# Patient Record
Sex: FEMALE | Race: WHITE | Employment: STUDENT | URBAN - METROPOLITAN AREA
[De-identification: names, ages, dates, MRNs, and addresses within clinical notes are randomized per-mention and may not be internally consistent; named-entity substitution may affect disease eponyms.]

---

## 2017-01-10 ENCOUNTER — OFFICE VISIT - HEALTHEAST (OUTPATIENT)
Dept: RHEUMATOLOGY | Facility: CLINIC | Age: 22
End: 2017-01-10

## 2017-01-10 DIAGNOSIS — M25.50 POLYARTHRALGIA: ICD-10-CM

## 2017-01-10 LAB
C3 SERPL-MCNC: 138 MG/DL (ref 83–177)
C4 SERPL-MCNC: 33 MG/DL (ref 19–59)

## 2017-01-12 LAB
ANA SER QL: 0.3 U
COMPLEMENT, TOTAL, S: 57 U/ML (ref 30–75)

## 2017-01-17 LAB — DNA (DS) ANTIBODY - HISTORICAL: 6 IU

## 2017-01-24 ENCOUNTER — OFFICE VISIT - HEALTHEAST (OUTPATIENT)
Dept: FAMILY MEDICINE | Facility: CLINIC | Age: 22
End: 2017-01-24

## 2017-01-24 DIAGNOSIS — F32.A DEPRESSION: ICD-10-CM

## 2017-01-24 DIAGNOSIS — F41.9 ANXIETY: ICD-10-CM

## 2017-01-24 ASSESSMENT — MIFFLIN-ST. JEOR: SCORE: 1502.46

## 2017-02-08 ENCOUNTER — OFFICE VISIT - HEALTHEAST (OUTPATIENT)
Dept: RHEUMATOLOGY | Facility: CLINIC | Age: 22
End: 2017-02-08

## 2017-02-08 ENCOUNTER — COMMUNICATION - HEALTHEAST (OUTPATIENT)
Dept: FAMILY MEDICINE | Facility: CLINIC | Age: 22
End: 2017-02-08

## 2017-02-08 DIAGNOSIS — M79.7 FIBROMYALGIA: ICD-10-CM

## 2017-02-08 ASSESSMENT — MIFFLIN-ST. JEOR: SCORE: 1502.46

## 2017-03-13 ENCOUNTER — COMMUNICATION - HEALTHEAST (OUTPATIENT)
Dept: FAMILY MEDICINE | Facility: CLINIC | Age: 22
End: 2017-03-13

## 2017-03-13 DIAGNOSIS — F32.A DEPRESSION: ICD-10-CM

## 2017-04-07 ENCOUNTER — COMMUNICATION - HEALTHEAST (OUTPATIENT)
Dept: FAMILY MEDICINE | Facility: CLINIC | Age: 22
End: 2017-04-07

## 2017-04-07 DIAGNOSIS — F32.A DEPRESSION: ICD-10-CM

## 2017-04-07 DIAGNOSIS — F41.9 ANXIETY: ICD-10-CM

## 2017-06-28 ENCOUNTER — OFFICE VISIT - HEALTHEAST (OUTPATIENT)
Dept: FAMILY MEDICINE | Facility: CLINIC | Age: 22
End: 2017-06-28

## 2017-06-28 DIAGNOSIS — M25.559 HIP PAIN: ICD-10-CM

## 2017-06-28 DIAGNOSIS — N83.209 OVARIAN CYST: ICD-10-CM

## 2017-06-28 DIAGNOSIS — M25.519 SHOULDER PAIN: ICD-10-CM

## 2017-06-28 ASSESSMENT — MIFFLIN-ST. JEOR: SCORE: 1525.14

## 2017-07-01 ENCOUNTER — HEALTH MAINTENANCE LETTER (OUTPATIENT)
Age: 22
End: 2017-07-01

## 2017-07-24 ENCOUNTER — COMMUNICATION - HEALTHEAST (OUTPATIENT)
Dept: FAMILY MEDICINE | Facility: CLINIC | Age: 22
End: 2017-07-24

## 2017-08-01 ENCOUNTER — COMMUNICATION - HEALTHEAST (OUTPATIENT)
Dept: FAMILY MEDICINE | Facility: CLINIC | Age: 22
End: 2017-08-01

## 2017-09-22 ENCOUNTER — RECORDS - HEALTHEAST (OUTPATIENT)
Dept: ADMINISTRATIVE | Facility: OTHER | Age: 22
End: 2017-09-22

## 2017-10-04 ENCOUNTER — OFFICE VISIT - HEALTHEAST (OUTPATIENT)
Dept: FAMILY MEDICINE | Facility: CLINIC | Age: 22
End: 2017-10-04

## 2017-10-04 DIAGNOSIS — M25.512 SHOULDER PAIN, BILATERAL: ICD-10-CM

## 2017-10-04 DIAGNOSIS — Z00.00 HEALTHCARE MAINTENANCE: ICD-10-CM

## 2017-10-04 DIAGNOSIS — F41.9 ANXIETY: ICD-10-CM

## 2017-10-04 DIAGNOSIS — Z11.3 SCREEN FOR STD (SEXUALLY TRANSMITTED DISEASE): ICD-10-CM

## 2017-10-04 DIAGNOSIS — M25.511 SHOULDER PAIN, BILATERAL: ICD-10-CM

## 2017-10-04 LAB — HIV 1+2 AB+HIV1 P24 AG SERPL QL IA: NEGATIVE

## 2017-10-04 ASSESSMENT — MIFFLIN-ST. JEOR: SCORE: 1535.91

## 2017-10-05 LAB
HBV SURFACE AG SERPL QL IA: NEGATIVE
SYPHILIS RPR SCREEN - HISTORICAL: NORMAL

## 2017-10-14 ENCOUNTER — COMMUNICATION - HEALTHEAST (OUTPATIENT)
Dept: FAMILY MEDICINE | Facility: CLINIC | Age: 22
End: 2017-10-14

## 2017-10-14 DIAGNOSIS — N83.209 OVARIAN CYST: ICD-10-CM

## 2017-11-02 ENCOUNTER — COMMUNICATION - HEALTHEAST (OUTPATIENT)
Dept: FAMILY MEDICINE | Facility: CLINIC | Age: 22
End: 2017-11-02

## 2018-01-06 ENCOUNTER — COMMUNICATION - HEALTHEAST (OUTPATIENT)
Dept: FAMILY MEDICINE | Facility: CLINIC | Age: 23
End: 2018-01-06

## 2018-01-06 DIAGNOSIS — N83.209 OVARIAN CYST: ICD-10-CM

## 2018-01-09 ENCOUNTER — COMMUNICATION - HEALTHEAST (OUTPATIENT)
Dept: FAMILY MEDICINE | Facility: CLINIC | Age: 23
End: 2018-01-09

## 2018-01-17 ENCOUNTER — OFFICE VISIT - HEALTHEAST (OUTPATIENT)
Dept: FAMILY MEDICINE | Facility: CLINIC | Age: 23
End: 2018-01-17

## 2018-01-17 DIAGNOSIS — J32.9 SINUSITIS: ICD-10-CM

## 2018-03-07 ENCOUNTER — OFFICE VISIT - HEALTHEAST (OUTPATIENT)
Dept: FAMILY MEDICINE | Facility: CLINIC | Age: 23
End: 2018-03-07

## 2018-03-07 DIAGNOSIS — Z00.00 HEALTHCARE MAINTENANCE: ICD-10-CM

## 2018-03-07 DIAGNOSIS — F32.A DEPRESSION: ICD-10-CM

## 2018-03-07 ASSESSMENT — MIFFLIN-ST. JEOR: SCORE: 1550

## 2018-03-09 LAB
BKR LAB AP ABNORMAL BLEEDING: NO
BKR LAB AP BIRTH CONTROL/HORMONES: NORMAL
BKR LAB AP CERVICAL APPEARANCE: NORMAL
BKR LAB AP GYN ADEQUACY: NORMAL
BKR LAB AP GYN INTERPRETATION: NORMAL
BKR LAB AP HPV REFLEX: NORMAL
BKR LAB AP LMP: NORMAL
BKR LAB AP PATIENT STATUS: NORMAL
BKR LAB AP PREVIOUS ABNORMAL: NORMAL
BKR LAB AP PREVIOUS NORMAL: 2015
HIGH RISK?: NO
PATH REPORT.COMMENTS IMP SPEC: NORMAL
RESULT FLAG (HE HISTORICAL CONVERSION): NORMAL

## 2018-03-10 ENCOUNTER — COMMUNICATION - HEALTHEAST (OUTPATIENT)
Dept: FAMILY MEDICINE | Facility: CLINIC | Age: 23
End: 2018-03-10

## 2018-03-10 DIAGNOSIS — F32.A DEPRESSION: ICD-10-CM

## 2018-04-10 ENCOUNTER — COMMUNICATION - HEALTHEAST (OUTPATIENT)
Dept: FAMILY MEDICINE | Facility: CLINIC | Age: 23
End: 2018-04-10

## 2018-04-13 ENCOUNTER — COMMUNICATION - HEALTHEAST (OUTPATIENT)
Dept: FAMILY MEDICINE | Facility: CLINIC | Age: 23
End: 2018-04-13

## 2018-04-18 ENCOUNTER — TRANSFERRED RECORDS (OUTPATIENT)
Dept: HEALTH INFORMATION MANAGEMENT | Facility: CLINIC | Age: 23
End: 2018-04-18
Payer: COMMERCIAL

## 2018-05-02 ENCOUNTER — COMMUNICATION - HEALTHEAST (OUTPATIENT)
Dept: FAMILY MEDICINE | Facility: CLINIC | Age: 23
End: 2018-05-02

## 2018-05-09 ENCOUNTER — OFFICE VISIT - HEALTHEAST (OUTPATIENT)
Dept: FAMILY MEDICINE | Facility: CLINIC | Age: 23
End: 2018-05-09

## 2018-05-09 DIAGNOSIS — F41.9 ANXIETY: ICD-10-CM

## 2018-05-09 ASSESSMENT — MIFFLIN-ST. JEOR: SCORE: 1580.69

## 2018-05-22 ENCOUNTER — COMMUNICATION - HEALTHEAST (OUTPATIENT)
Dept: FAMILY MEDICINE | Facility: CLINIC | Age: 23
End: 2018-05-22

## 2018-05-22 DIAGNOSIS — F41.9 ANXIETY: ICD-10-CM

## 2018-06-03 ENCOUNTER — COMMUNICATION - HEALTHEAST (OUTPATIENT)
Dept: FAMILY MEDICINE | Facility: CLINIC | Age: 23
End: 2018-06-03

## 2018-06-03 DIAGNOSIS — F32.A DEPRESSION: ICD-10-CM

## 2018-06-05 ENCOUNTER — COMMUNICATION - HEALTHEAST (OUTPATIENT)
Dept: FAMILY MEDICINE | Facility: CLINIC | Age: 23
End: 2018-06-05

## 2018-06-05 DIAGNOSIS — R06.02 SHORTNESS OF BREATH: ICD-10-CM

## 2018-06-06 ENCOUNTER — RECORDS - HEALTHEAST (OUTPATIENT)
Dept: ADMINISTRATIVE | Facility: OTHER | Age: 23
End: 2018-06-06

## 2018-06-27 ENCOUNTER — RECORDS - HEALTHEAST (OUTPATIENT)
Dept: ADMINISTRATIVE | Facility: OTHER | Age: 23
End: 2018-06-27

## 2018-07-03 ENCOUNTER — OFFICE VISIT - HEALTHEAST (OUTPATIENT)
Dept: FAMILY MEDICINE | Facility: CLINIC | Age: 23
End: 2018-07-03

## 2018-07-03 DIAGNOSIS — Z79.899 HIGH RISK MEDICATION USE: ICD-10-CM

## 2018-07-03 DIAGNOSIS — F41.9 ANXIETY: ICD-10-CM

## 2018-07-03 DIAGNOSIS — R11.0 NAUSEA: ICD-10-CM

## 2018-07-03 DIAGNOSIS — F32.1 MODERATE MAJOR DEPRESSION (H): ICD-10-CM

## 2018-07-03 LAB
ALBUMIN SERPL-MCNC: 4.1 G/DL (ref 3.5–5)
ALP SERPL-CCNC: 66 U/L (ref 45–120)
ALT SERPL W P-5'-P-CCNC: 21 U/L (ref 0–45)
AMPHETAMINES UR QL SCN: NORMAL
ANION GAP SERPL CALCULATED.3IONS-SCNC: 10 MMOL/L (ref 5–18)
AST SERPL W P-5'-P-CCNC: 23 U/L (ref 0–40)
BARBITURATES UR QL: NORMAL
BASOPHILS # BLD AUTO: 0.1 THOU/UL (ref 0–0.2)
BASOPHILS NFR BLD AUTO: 1 % (ref 0–2)
BENZODIAZ UR QL: NORMAL
BILIRUB SERPL-MCNC: 0.3 MG/DL (ref 0–1)
BUN SERPL-MCNC: 10 MG/DL (ref 8–22)
CALCIUM SERPL-MCNC: 9.8 MG/DL (ref 8.5–10.5)
CANNABINOIDS UR QL SCN: NORMAL
CHLORIDE BLD-SCNC: 105 MMOL/L (ref 98–107)
CO2 SERPL-SCNC: 27 MMOL/L (ref 22–31)
COCAINE UR QL: NORMAL
CREAT SERPL-MCNC: 0.67 MG/DL (ref 0.6–1.1)
CREAT UR-MCNC: 107.4 MG/DL
EOSINOPHIL # BLD AUTO: 0.2 THOU/UL (ref 0–0.4)
EOSINOPHIL NFR BLD AUTO: 2 % (ref 0–6)
ERYTHROCYTE [DISTWIDTH] IN BLOOD BY AUTOMATED COUNT: 11.8 % (ref 11–14.5)
ETHANOL UR CFM-MCNC: <10 MG/DL
GFR SERPL CREATININE-BSD FRML MDRD: >60 ML/MIN/1.73M2
GLUCOSE BLD-MCNC: 94 MG/DL (ref 70–125)
HCT VFR BLD AUTO: 40.7 % (ref 35–47)
HGB BLD-MCNC: 13.9 G/DL (ref 12–16)
LYMPHOCYTES # BLD AUTO: 3.1 THOU/UL (ref 0.8–4.4)
LYMPHOCYTES NFR BLD AUTO: 40 % (ref 20–40)
MCH RBC QN AUTO: 30.4 PG (ref 27–34)
MCHC RBC AUTO-ENTMCNC: 34.1 G/DL (ref 32–36)
MCV RBC AUTO: 89 FL (ref 80–100)
MONOCYTES # BLD AUTO: 0.4 THOU/UL (ref 0–0.9)
MONOCYTES NFR BLD AUTO: 5 % (ref 2–10)
NEUTROPHILS # BLD AUTO: 4.1 THOU/UL (ref 2–7.7)
NEUTROPHILS NFR BLD AUTO: 52 % (ref 50–70)
OPIATES UR QL SCN: NORMAL
OXYCODONE UR QL: NORMAL
PCP UR QL SCN: NORMAL
PLATELET # BLD AUTO: 391 THOU/UL (ref 140–440)
PMV BLD AUTO: 8.2 FL (ref 7–10)
POTASSIUM BLD-SCNC: 4.6 MMOL/L (ref 3.5–5)
PROT SERPL-MCNC: 7.3 G/DL (ref 6–8)
RBC # BLD AUTO: 4.57 MILL/UL (ref 3.8–5.4)
SODIUM SERPL-SCNC: 142 MMOL/L (ref 136–145)
T4 FREE SERPL-MCNC: 0.9 NG/DL (ref 0.7–1.8)
TSH SERPL DL<=0.005 MIU/L-ACNC: 1.53 UIU/ML (ref 0.3–5)
WBC: 7.8 THOU/UL (ref 4–11)

## 2018-07-03 ASSESSMENT — MIFFLIN-ST. JEOR: SCORE: 1567.93

## 2018-07-08 LAB
ARUP MISCELLANEOUS TEST: NORMAL
MISCELLANEOUS TEST DEPT. - HE HISTORICAL: NORMAL
PERFORMING LAB: NORMAL
SPECIMEN STATUS: NORMAL
TEST NAME: NORMAL

## 2018-07-09 LAB
MISCELLANEOUS TEST DEPT. - HE HISTORICAL: NORMAL
PERFORMING LAB: NORMAL
SPECIMEN STATUS: NORMAL
TEST NAME: NORMAL

## 2018-08-03 ENCOUNTER — COMMUNICATION - HEALTHEAST (OUTPATIENT)
Dept: FAMILY MEDICINE | Facility: CLINIC | Age: 23
End: 2018-08-03

## 2018-10-01 ENCOUNTER — AMBULATORY - HEALTHEAST (OUTPATIENT)
Dept: NURSING | Facility: CLINIC | Age: 23
End: 2018-10-01

## 2018-10-22 ENCOUNTER — OFFICE VISIT - HEALTHEAST (OUTPATIENT)
Dept: FAMILY MEDICINE | Facility: CLINIC | Age: 23
End: 2018-10-22

## 2018-10-22 DIAGNOSIS — M25.552 PAIN OF BOTH HIP JOINTS: ICD-10-CM

## 2018-10-22 DIAGNOSIS — M25.551 PAIN OF BOTH HIP JOINTS: ICD-10-CM

## 2018-10-22 DIAGNOSIS — F41.9 ANXIETY: ICD-10-CM

## 2018-10-22 ASSESSMENT — MIFFLIN-ST. JEOR: SCORE: 1542.99

## 2018-10-30 ENCOUNTER — OFFICE VISIT - HEALTHEAST (OUTPATIENT)
Dept: FAMILY MEDICINE | Facility: CLINIC | Age: 23
End: 2018-10-30

## 2018-10-30 ENCOUNTER — COMMUNICATION - HEALTHEAST (OUTPATIENT)
Dept: FAMILY MEDICINE | Facility: CLINIC | Age: 23
End: 2018-10-30

## 2018-10-30 DIAGNOSIS — Z30.432 ENCOUNTER FOR IUD REMOVAL: ICD-10-CM

## 2018-10-30 DIAGNOSIS — Z31.69 ENCOUNTER FOR PRECONCEPTION CONSULTATION: ICD-10-CM

## 2018-10-30 ASSESSMENT — MIFFLIN-ST. JEOR: SCORE: 1543.55

## 2019-04-24 ENCOUNTER — COMMUNICATION - HEALTHEAST (OUTPATIENT)
Dept: FAMILY MEDICINE | Facility: CLINIC | Age: 24
End: 2019-04-24

## 2019-09-04 ENCOUNTER — OFFICE VISIT - HEALTHEAST (OUTPATIENT)
Dept: FAMILY MEDICINE | Facility: CLINIC | Age: 24
End: 2019-09-04

## 2019-09-04 ENCOUNTER — COMMUNICATION - HEALTHEAST (OUTPATIENT)
Dept: FAMILY MEDICINE | Facility: CLINIC | Age: 24
End: 2019-09-04

## 2019-09-04 DIAGNOSIS — Z00.00 ROUTINE GENERAL MEDICAL EXAMINATION AT A HEALTH CARE FACILITY: ICD-10-CM

## 2019-09-04 DIAGNOSIS — F41.9 ANXIETY: ICD-10-CM

## 2019-09-04 DIAGNOSIS — Z11.3 SCREEN FOR STD (SEXUALLY TRANSMITTED DISEASE): ICD-10-CM

## 2019-09-04 DIAGNOSIS — Z79.899 HIGH RISK MEDICATION USE: ICD-10-CM

## 2019-09-04 DIAGNOSIS — R11.0 NAUSEA: ICD-10-CM

## 2019-09-04 LAB
CHOLEST SERPL-MCNC: 151 MG/DL
FASTING STATUS PATIENT QL REPORTED: NO
HBA1C MFR BLD: 5 % (ref 3.5–6)
HDLC SERPL-MCNC: 34 MG/DL
HGB BLD-MCNC: 14 G/DL (ref 12–16)
HIV 1+2 AB+HIV1 P24 AG SERPL QL IA: NEGATIVE
LDLC SERPL CALC-MCNC: 92 MG/DL
TRIGL SERPL-MCNC: 125 MG/DL

## 2019-09-04 ASSESSMENT — MIFFLIN-ST. JEOR: SCORE: 1507.92

## 2019-09-05 ENCOUNTER — COMMUNICATION - HEALTHEAST (OUTPATIENT)
Dept: FAMILY MEDICINE | Facility: CLINIC | Age: 24
End: 2019-09-05

## 2019-09-05 LAB — T PALLIDUM AB SER QL: NEGATIVE

## 2019-09-06 ASSESSMENT — ANXIETY QUESTIONNAIRES
2. NOT BEING ABLE TO STOP OR CONTROL WORRYING: NEARLY EVERY DAY
7. FEELING AFRAID AS IF SOMETHING AWFUL MIGHT HAPPEN: NEARLY EVERY DAY
6. BECOMING EASILY ANNOYED OR IRRITABLE: NEARLY EVERY DAY
4. TROUBLE RELAXING: NEARLY EVERY DAY
IF YOU CHECKED OFF ANY PROBLEMS ON THIS QUESTIONNAIRE, HOW DIFFICULT HAVE THESE PROBLEMS MADE IT FOR YOU TO DO YOUR WORK, TAKE CARE OF THINGS AT HOME, OR GET ALONG WITH OTHER PEOPLE: VERY DIFFICULT
5. BEING SO RESTLESS THAT IT IS HARD TO SIT STILL: MORE THAN HALF THE DAYS
1. FEELING NERVOUS, ANXIOUS, OR ON EDGE: NEARLY EVERY DAY
GAD7 TOTAL SCORE: 20
3. WORRYING TOO MUCH ABOUT DIFFERENT THINGS: NEARLY EVERY DAY

## 2019-09-06 ASSESSMENT — PATIENT HEALTH QUESTIONNAIRE - PHQ9: SUM OF ALL RESPONSES TO PHQ QUESTIONS 1-9: 15

## 2019-10-03 ENCOUNTER — HEALTH MAINTENANCE LETTER (OUTPATIENT)
Age: 24
End: 2019-10-03

## 2019-10-16 ENCOUNTER — COMMUNICATION - HEALTHEAST (OUTPATIENT)
Dept: FAMILY MEDICINE | Facility: CLINIC | Age: 24
End: 2019-10-16

## 2019-10-16 DIAGNOSIS — Z78.9 USES BIRTH CONTROL: ICD-10-CM

## 2019-11-12 ENCOUNTER — OFFICE VISIT - HEALTHEAST (OUTPATIENT)
Dept: FAMILY MEDICINE | Facility: CLINIC | Age: 24
End: 2019-11-12

## 2019-11-12 DIAGNOSIS — F32.1 MODERATE MAJOR DEPRESSION (H): ICD-10-CM

## 2019-11-12 DIAGNOSIS — F41.9 ANXIETY: ICD-10-CM

## 2019-11-12 DIAGNOSIS — R11.0 NAUSEA: ICD-10-CM

## 2019-11-12 DIAGNOSIS — Z79.899 HIGH RISK MEDICATION USE: ICD-10-CM

## 2019-11-12 DIAGNOSIS — Z30.09 ENCOUNTER FOR OTHER GENERAL COUNSELING AND ADVICE ON CONTRACEPTION: ICD-10-CM

## 2019-11-12 ASSESSMENT — MIFFLIN-ST. JEOR: SCORE: 1467.93

## 2019-11-19 ENCOUNTER — OFFICE VISIT - HEALTHEAST (OUTPATIENT)
Dept: FAMILY MEDICINE | Facility: CLINIC | Age: 24
End: 2019-11-19

## 2019-11-19 DIAGNOSIS — Z30.430 ENCOUNTER FOR IUD INSERTION: ICD-10-CM

## 2019-11-19 LAB — HCG UR QL: NEGATIVE

## 2019-12-16 ENCOUNTER — OFFICE VISIT - HEALTHEAST (OUTPATIENT)
Dept: FAMILY MEDICINE | Facility: CLINIC | Age: 24
End: 2019-12-16

## 2019-12-16 ENCOUNTER — COMMUNICATION - HEALTHEAST (OUTPATIENT)
Dept: FAMILY MEDICINE | Facility: CLINIC | Age: 24
End: 2019-12-16

## 2019-12-16 DIAGNOSIS — Z30.431 IUD CHECK UP: ICD-10-CM

## 2019-12-16 DIAGNOSIS — R05.9 COUGH: ICD-10-CM

## 2019-12-16 DIAGNOSIS — R06.02 SHORTNESS OF BREATH: ICD-10-CM

## 2019-12-16 ASSESSMENT — MIFFLIN-ST. JEOR: SCORE: 1495.43

## 2019-12-18 ENCOUNTER — COMMUNICATION - HEALTHEAST (OUTPATIENT)
Dept: FAMILY MEDICINE | Facility: CLINIC | Age: 24
End: 2019-12-18

## 2019-12-18 DIAGNOSIS — R06.02 SHORTNESS OF BREATH: ICD-10-CM

## 2020-02-07 ENCOUNTER — RECORDS - HEALTHEAST (OUTPATIENT)
Dept: ADMINISTRATIVE | Facility: OTHER | Age: 25
End: 2020-02-07

## 2020-02-07 ENCOUNTER — HOSPITAL ENCOUNTER (EMERGENCY)
Facility: CLINIC | Age: 25
Discharge: HOME | End: 2020-02-07
Attending: NURSE PRACTITIONER | Admitting: NURSE PRACTITIONER
Payer: COMMERCIAL

## 2020-02-07 VITALS
OXYGEN SATURATION: 100 % | HEIGHT: 69 IN | BODY MASS INDEX: 22.22 KG/M2 | SYSTOLIC BLOOD PRESSURE: 107 MMHG | WEIGHT: 150 LBS | TEMPERATURE: 98.2 F | RESPIRATION RATE: 18 BRPM | HEART RATE: 103 BPM | DIASTOLIC BLOOD PRESSURE: 73 MMHG

## 2020-02-07 DIAGNOSIS — J06.9 VIRAL URI WITH COUGH: ICD-10-CM

## 2020-02-07 LAB
FLUAV AG UPPER RESP QL IA.RAPID: NEGATIVE
FLUBV AG UPPER RESP QL IA.RAPID: NEGATIVE
INTERNAL QC OK POCT: YES
INTERNAL QC OK POCT: YES
S PYO AG THROAT QL IA.RAPID: NEGATIVE

## 2020-02-07 PROCEDURE — 87081 CULTURE SCREEN ONLY: CPT | Performed by: NURSE PRACTITIONER

## 2020-02-07 PROCEDURE — G0463 HOSPITAL OUTPT CLINIC VISIT: HCPCS | Performed by: NURSE PRACTITIONER

## 2020-02-07 PROCEDURE — 99213 OFFICE O/P EST LOW 20 MIN: CPT | Mod: Z6 | Performed by: NURSE PRACTITIONER

## 2020-02-07 PROCEDURE — 87804 INFLUENZA ASSAY W/OPTIC: CPT | Performed by: NURSE PRACTITIONER

## 2020-02-07 PROCEDURE — 87880 STREP A ASSAY W/OPTIC: CPT | Performed by: NURSE PRACTITIONER

## 2020-02-07 ASSESSMENT — ENCOUNTER SYMPTOMS
CHILLS: 1
MYALGIAS: 1
SORE THROAT: 1
SHORTNESS OF BREATH: 0
COUGH: 1
DIARRHEA: 0
HEADACHES: 1
RHINORRHEA: 1
ABDOMINAL PAIN: 0
NAUSEA: 1
VOMITING: 0
WHEEZING: 0
FEVER: 1

## 2020-02-07 ASSESSMENT — MIFFLIN-ST. JEOR: SCORE: 1484.78

## 2020-02-07 NOTE — ED AVS SNAPSHOT
[facility] Emergency Department  [address] [facility] [address]  Phone:  [phone]  Fax:  [phone]                                    Britney Singh   MRN: [id number]    Department:  [facility] Emergency Department   Date of Visit:  2/7/2020           After Visit Summary Signature Page    I have received my discharge instructions, and my questions have been answered. I have discussed any challenges I see with this plan with the nurse or doctor.    ..........................................................................................................................................  Patient/Patient Representative Signature      ..........................................................................................................................................  Patient Representative Print Name and Relationship to Patient    ..................................................               ................................................  Date                                   Time    ..........................................................................................................................................  Reviewed by Signature/Title    ...................................................              ..............................................  Date                                               Time          22EPIC Rev 08/18

## 2020-02-07 NOTE — ED PROVIDER NOTES
History     Chief Complaint   Patient presents with     URI     LORETTA     HPI  Britney Singh is a 24 year old female with history of asthma who presents to urgent care for evaluation of cough, congestion, fever, body aches, and sore throat.  Symptoms started yesterday.  Nauseated, but no vomiting.  Tolerating fluids.  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    Allergies:  Allergies   Allergen Reactions     Amoxicillin Hives     mild hive reaction        Problem List:    Patient Active Problem List    Diagnosis Date Noted     Dysmenorrhea 2014     Priority: Medium     seasonale to see it helps  Ultram for the pain  Pelvic US in 6 weeks to f/u resolution of the ovarian cyst-US tomorrow-normal US  No help with seasonale-wants to try the depo-provera  Discussed that with symptoms some concern for endometriosis and might consider lupron versus laparoscopy       Anxiety 2013     Priority: Medium     Anorexia nervosa 2013     Priority: Medium     Insomnia 2013     Priority: Medium     TMJ (temporomandibular joint syndrome) 2011     Priority: Medium     Intermittent asthma 2011     Priority: Medium        Past Medical History:    Past Medical History:   Diagnosis Date     Anorexia      Asthma      Bipolar 1 disorder (H)      Depressive disorder        Past Surgical History:    History reviewed. No pertinent surgical history.    Family History:    Family History   Problem Relation Age of Onset     Alcohol/Drug Father      Alzheimer Disease Maternal Grandmother         dementia     Cerebrovascular Disease Maternal Grandfather      Breast Cancer Paternal Grandmother      Asthma Brother      Family History Negative Other        Social History:  Marital Status:  Single [1]  Social History     Tobacco Use     Smoking status: Former Smoker     Last attempt to quit: 2014     Years since quittin.0     Smokeless tobacco: Never Used  "  Substance Use Topics     Alcohol use: None     Comment: has in the past     Drug use: None     Comment: has in the past        Medications:    albuterol (PROAIR HFA) 108 (90 BASE) MCG/ACT inhaler  BuPROPion HCl (WELLBUTRIN XL PO)  simethicone (MYLICON) 80 MG chewable tablet          Review of Systems   Constitutional: Positive for chills and fever.   HENT: Positive for congestion, ear pain, rhinorrhea and sore throat.    Respiratory: Positive for cough. Negative for shortness of breath and wheezing.    Cardiovascular: Negative for chest pain.   Gastrointestinal: Positive for nausea. Negative for abdominal pain, diarrhea and vomiting.   Genitourinary: Negative.    Musculoskeletal: Positive for myalgias.   Neurological: Positive for headaches.       Physical Exam   BP: 107/73  Pulse: 103  Temp: 98.2  F (36.8  C)  Resp: 18  Height: 175.3 cm (5' 9\")  Weight: 68 kg (150 lb)  SpO2: 100 %      Physical Exam  GENERAL APPEARANCE: healthy, alert and ill-appearing but nontoxic.    EYES: watery eye drainage but conjunctiva clear  HENT: ear canals and TM's normal.  Rhinorrhea.  Posterior oropharynx erythema without exudate.  Uvula is midline.  No unilateral peritonsillar swelling. No trismus  NECK: supple, nontender, no lymphadenopathy  RESP: lungs clear to auscultation - no rales, rhonchi or wheezes  CV: tachycardia and regular rhythm, normal S1 S2, no murmur noted    ED Course        Procedures             Results for orders placed or performed during the hospital encounter of 02/07/20 (from the past 24 hour(s))   Rapid strep group A screen POCT   Result Value Ref Range    Rapid Strep A Screen Negative neg    Internal QC OK Yes    Influenza A/B antigen POCT   Result Value Ref Range    Influenza A Negative neg    Influenza B Negative neg    Internal QC OK Yes        Medications - No data to display    Assessments & Plan (with Medical Decision Making)   24 year old with URI and cough symptoms for 1 day. Tolerating fluids. "   DDx:  bronchitis, pneumonia, Viral URI (ie.. Influenza), sinusitis.    On exam patient is ill-appearing, but afebrile.  Normotensive.  Mild tachycardia.  Rhinorrhea present.  Lungs are CTA.  No hypoxia.  No respiratory distress.  No indication to obtain chest imaging at this time.  I have low suspicion for pneumonia.  Rapid strep is negative.  Influenza testing is negative.  I discussed with patient that this is likely a viral URI with cough, could still be an influenza-like illness with a false negative test.  We discussed treatment for influenza with Tamiflu if she wishes to proceed with this.  I reviewed the benefits, risks, and side effects of Tamiflu.  Patient declined Tamiflu at this time.    DDx:  bronchitis, pneumonia, Viral URI (ie.. Influenza), sinusitis.    I discussed the course of viral illness with patient as well as symptomatic treatment and worrisome reasons to recheck.    I have reviewed the nursing notes.    I have reviewed the findings, diagnosis, plan and need for follow up with the patient.      New Prescriptions    No medications on file       Final diagnoses:   Viral URI with cough       2/7/2020   [facility] EMERGENCY DEPARTMENT     Esther Rosa APRN CNP  02/07/20 [id number]

## 2020-02-07 NOTE — DISCHARGE INSTRUCTIONS
Drink plenty of fluids.  Rest.  For cough, dextromethorphan/guaifenesin combinations help loosen secretions and suppress cough safely without significant risk of sedation.      For nasal congestion and sinus pressure, pseudoephedrine (Sudafed) or phenylephrine is often helpful but it can cause elevations in blood pressure and insomnia.  Short courses of a nasal decongestant spray (Afrin or Neosinephrine) can be appropriate but their use should be restricted to 3 days due to the high risk of rebound congestion.  Use Coricidin as a decongestant if you have a history of hypertension.     For pain and fevers, acetaminophen (Tylenol) is most appropriate.  Ibuprofen (Advil) or naproxen (Aleve) are useful too and last longer but they can cause elevation of blood pressure or stomach problems.

## 2020-02-09 LAB
BACTERIA SPEC CULT: NORMAL
SPECIMEN SOURCE: NORMAL

## 2020-02-18 ENCOUNTER — COMMUNICATION - HEALTHEAST (OUTPATIENT)
Dept: FAMILY MEDICINE | Facility: CLINIC | Age: 25
End: 2020-02-18

## 2020-02-27 ENCOUNTER — COMMUNICATION - HEALTHEAST (OUTPATIENT)
Dept: FAMILY MEDICINE | Facility: CLINIC | Age: 25
End: 2020-02-27

## 2020-02-27 DIAGNOSIS — Z97.5 BREAKTHROUGH BLEEDING ASSOCIATED WITH INTRAUTERINE DEVICE (IUD): ICD-10-CM

## 2020-02-27 DIAGNOSIS — N92.1 BREAKTHROUGH BLEEDING ASSOCIATED WITH INTRAUTERINE DEVICE (IUD): ICD-10-CM

## 2020-03-23 ENCOUNTER — COMMUNICATION - HEALTHEAST (OUTPATIENT)
Dept: FAMILY MEDICINE | Facility: CLINIC | Age: 25
End: 2020-03-23

## 2020-04-03 ENCOUNTER — COMMUNICATION - HEALTHEAST (OUTPATIENT)
Dept: FAMILY MEDICINE | Facility: CLINIC | Age: 25
End: 2020-04-03

## 2020-04-03 DIAGNOSIS — Z79.899 HIGH RISK MEDICATION USE: ICD-10-CM

## 2020-04-03 DIAGNOSIS — R11.0 NAUSEA: ICD-10-CM

## 2020-04-03 DIAGNOSIS — F41.9 ANXIETY: ICD-10-CM

## 2020-05-22 ENCOUNTER — COMMUNICATION - HEALTHEAST (OUTPATIENT)
Dept: FAMILY MEDICINE | Facility: CLINIC | Age: 25
End: 2020-05-22

## 2020-05-27 ENCOUNTER — OFFICE VISIT - HEALTHEAST (OUTPATIENT)
Dept: FAMILY MEDICINE | Facility: CLINIC | Age: 25
End: 2020-05-27

## 2020-05-27 DIAGNOSIS — Z30.431 IUD CHECK UP: ICD-10-CM

## 2020-05-27 DIAGNOSIS — F41.9 ANXIETY: ICD-10-CM

## 2020-05-27 ASSESSMENT — ANXIETY QUESTIONNAIRES
4. TROUBLE RELAXING: NEARLY EVERY DAY
GAD7 TOTAL SCORE: 17
2. NOT BEING ABLE TO STOP OR CONTROL WORRYING: NEARLY EVERY DAY
5. BEING SO RESTLESS THAT IT IS HARD TO SIT STILL: SEVERAL DAYS
1. FEELING NERVOUS, ANXIOUS, OR ON EDGE: NEARLY EVERY DAY
7. FEELING AFRAID AS IF SOMETHING AWFUL MIGHT HAPPEN: MORE THAN HALF THE DAYS
6. BECOMING EASILY ANNOYED OR IRRITABLE: MORE THAN HALF THE DAYS
3. WORRYING TOO MUCH ABOUT DIFFERENT THINGS: NEARLY EVERY DAY

## 2020-05-27 ASSESSMENT — PATIENT HEALTH QUESTIONNAIRE - PHQ9: SUM OF ALL RESPONSES TO PHQ QUESTIONS 1-9: 16

## 2020-05-28 ENCOUNTER — COMMUNICATION - HEALTHEAST (OUTPATIENT)
Dept: FAMILY MEDICINE | Facility: CLINIC | Age: 25
End: 2020-05-28

## 2020-07-12 ENCOUNTER — COMMUNICATION - HEALTHEAST (OUTPATIENT)
Dept: FAMILY MEDICINE | Facility: CLINIC | Age: 25
End: 2020-07-12

## 2020-07-15 ENCOUNTER — HOSPITAL ENCOUNTER (OUTPATIENT)
Dept: ULTRASOUND IMAGING | Facility: CLINIC | Age: 25
Discharge: HOME | End: 2020-07-15
Admitting: RADIOLOGY
Payer: COMMERCIAL

## 2020-07-15 ENCOUNTER — COMMUNICATION - HEALTHEAST (OUTPATIENT)
Dept: FAMILY MEDICINE | Facility: CLINIC | Age: 25
End: 2020-07-15

## 2020-07-15 ENCOUNTER — RECORDS - HEALTHEAST (OUTPATIENT)
Dept: ADMINISTRATIVE | Facility: OTHER | Age: 25
End: 2020-07-15

## 2020-07-15 DIAGNOSIS — Z30.431 IUD CHECK UP: ICD-10-CM

## 2020-07-15 DIAGNOSIS — F41.9 ANXIETY: ICD-10-CM

## 2020-07-15 PROCEDURE — 76856 US EXAM PELVIC COMPLETE: CPT

## 2020-07-16 ENCOUNTER — COMMUNICATION - HEALTHEAST (OUTPATIENT)
Dept: FAMILY MEDICINE | Facility: CLINIC | Age: 25
End: 2020-07-16

## 2020-09-10 ENCOUNTER — COMMUNICATION - HEALTHEAST (OUTPATIENT)
Dept: FAMILY MEDICINE | Facility: CLINIC | Age: 25
End: 2020-09-10

## 2020-09-11 ENCOUNTER — OFFICE VISIT - HEALTHEAST (OUTPATIENT)
Dept: FAMILY MEDICINE | Facility: CLINIC | Age: 25
End: 2020-09-11

## 2020-09-11 DIAGNOSIS — Z23 INFLUENZA VACCINE NEEDED: ICD-10-CM

## 2020-09-11 DIAGNOSIS — Z30.49 ENCOUNTER FOR SURVEILLANCE OF OTHER CONTRACEPTIVE: ICD-10-CM

## 2020-09-11 DIAGNOSIS — Z30.41 SURVEILLANCE OF CONTRACEPTIVE PILL: ICD-10-CM

## 2020-09-11 DIAGNOSIS — Z30.432 ENCOUNTER FOR IUD REMOVAL: ICD-10-CM

## 2020-09-11 LAB
ERYTHROCYTE [DISTWIDTH] IN BLOOD BY AUTOMATED COUNT: 11.4 % (ref 11–14.5)
HCT VFR BLD AUTO: 42.7 % (ref 35–47)
HGB BLD-MCNC: 14.3 G/DL (ref 12–16)
MCH RBC QN AUTO: 31.4 PG (ref 27–34)
MCHC RBC AUTO-ENTMCNC: 33.5 G/DL (ref 32–36)
MCV RBC AUTO: 94 FL (ref 80–100)
PLATELET # BLD AUTO: 309 THOU/UL (ref 140–440)
PMV BLD AUTO: 7.8 FL (ref 7–10)
RBC # BLD AUTO: 4.56 MILL/UL (ref 3.8–5.4)
WBC: 6.5 THOU/UL (ref 4–11)

## 2020-09-11 ASSESSMENT — MIFFLIN-ST. JEOR: SCORE: 1545.61

## 2020-10-07 ENCOUNTER — VIRTUAL VISIT (OUTPATIENT)
Dept: FAMILY MEDICINE | Facility: OTHER | Age: 25
End: 2020-10-07

## 2020-10-07 NOTE — PROGRESS NOTES
"Date: 10/07/2020 11:22:49  Clinician: Jaz Coats  Clinician NPI: [id number]  Patient: Britney Singh  Patient : 1995  Patient Address: [address] [place] [address]  Patient Phone: [phone]  Visit Protocol: URI  Patient Summary:  Britney is a 24 year old ( : 1995 ) female who initiated a [facility] Visit for COVID-19 (Coronavirus) evaluation and screening. When asked the question \"Please sign me up to receive news, health information and promotions. \", Britney responded \"No\".    Britney states her symptoms started 1-2 days ago.   Her symptoms consist of a headache, a cough, rhinitis, myalgia, chills, malaise, a sore throat, and ageusia. She is experiencing mild difficulty breathing with activities but can speak normally in full sentences.   Symptom details     Nasal secretions: The color of her mucus is clear.    Cough: Britney coughs every 5-10 minutes and her cough is not more bothersome at night. Phlegm comes into her throat when she coughs. She believes her cough is caused by post-nasal drip. The color of the phlegm is yellow.     Sore throat: Britney reports having moderate throat pain (4-6 on a 10 point pain scale), does not have exudate on her tonsils, and can swallow liquids. She is not sure if the lymph nodes in her neck are enlarged. A rash has not appeared on the skin since the sore throat started.     Headache: She states the headache is moderate (4-6 on a 10 point pain scale).      Britney denies having ear pain, wheezing, fever, nasal congestion, anosmia, vomiting, nausea, facial pain or pressure, teeth pain, and diarrhea. She also denies taking antibiotic medication in the past month and having recent facial or sinus surgery in the past 60 days.   Precipitating events  Within the past week, Britney has not been exposed to someone with strep throat. She has not recently been exposed to someone with influenza. Britney has been in close contact with the following high risk individuals: " people with asthma, heart disease or diabetes and children under the age of 5.   Pertinent COVID-19 (Coronavirus) information  In the past 14 days, Britney has not worked in a congregate living setting.   She does not work or volunteer as healthcare worker or a  and does not work or volunteer in a healthcare facility.   Britney also has not lived in a congregate living setting in the past 14 days. She does not live with a healthcare worker.   Britney has not had a close contact with a laboratory-confirmed COVID-19 patient within 14 days of symptom onset.   Since December 2019, Britney and has had upper respiratory infection (URI) or influenza-like illness. Has not been diagnosed with lab-confirmed COVID-19 test      Date(s) of previous URI or influenza-like illness (free-text): 3/2/2020     Symptoms Britney experienced during previous URI or influenza-like illness as reported by the patient (free-text): Cough, fever, runny nose        Pertinent medical history  Britney does not get yeast infections when she takes antibiotics.   Britney needs a return to work/school note.   Weight: 160 lbs   Britney does not smoke or use smokeless tobacco.   She denies pregnancy and denies breastfeeding. She is currently menstruating.   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 Students were exposed to known covid   Weight: 160 lbs    MEDICATIONS: Deblitane oral, lorazepam oral, buspirone oral, ALLERGIES: amoxicillin  Clinician Response:  Dear Britney,   Your symptoms show that you may have coronavirus (COVID-19). This illness can cause fever, cough and trouble breathing. Many people get a mild case and get better on their own. Some people can get very sick.  What should I do?  We would like to test you for this virus.   1. Please call [phone] to schedule your visit. Explain that you were referred by  "[facility] to have a COVID-19 test. Be ready to share your [facility] visit ID number.  The following will serve as your written order for this COVID Test, ordered by me, for the indication of suspected COVID [Z20.828]: The test will be ordered in [employer], our electronic health record, after you are scheduled. It will show as ordered and authorized by Layton Del Valle MD.  Order: COVID-19 (Coronavirus) PCR for SYMPTOMATIC testing from [facility].      2. When it's time for your COVID test:  Stay at least 6 feet away from others. (If someone will drive you to your test, stay in the backseat, as far away from the  as you can.)   Cover your mouth and nose with a mask, tissue or washcloth.  Go straight to the testing site. Don't make any stops on the way there or back.      3.Starting now: Stay home and away from others (self-isolate) until:   You've had no fever---and no medicine that reduces fever---for one full day (24 hours). And...   Your other symptoms have gotten better. For example, your cough or breathing has improved. And...   At least 10 days have passed since your symptoms started.       During this time, don't leave the house except for testing or medical care.   Stay in your own room, even for meals. Use your own bathroom if you can.   Stay away from others in your home. No hugging, kissing or shaking hands. No visitors.  Don't go to work, school or anywhere else.    Clean \"high touch\" surfaces often (doorknobs, counters, handles, etc.). Use a household cleaning spray or wipes. You'll find a full list of  on the EPA website: www.epa.gov/pesticide-registration/list-n-disinfectants-use-against-sars-cov-2.   Cover your mouth and nose with a mask, tissue or washcloth to avoid spreading germs.  Wash your hands and face often. Use soap and water.  Caregivers in these groups are at risk for severe illness due to COVID-19:  o People 65 years and older  o People who live in a nursing home or long-term care facility  o " People with chronic disease (lung, heart, cancer, diabetes, kidney, liver, immunologic)  o People who have a weakened immune system, including those who:   Are in cancer treatment  Take medicine that weakens the immune system, such as corticosteroids  Had a bone marrow or organ transplant  Have an immune deficiency  Have poorly controlled HIV or AIDS  Are obese (body mass index of 40 or higher)  Smoke regularly   o Caregivers should wear gloves while washing dishes, handling laundry and cleaning bedrooms and bathrooms.  o Use caution when washing and drying laundry: Don't shake dirty laundry, and use the warmest water setting that you can.  o For more tips, go to www.cdc.gov/coronavirus/2019-ncov/downloads/10Things.pdf.    4.Sign up for sones. We know it's scary to hear that you might have COVID-19. We want to track your symptoms to make sure you're okay over the next 2 weeks. Please look for an email from sones---this is a free, online program that we'll use to keep in touch. To sign up, follow the link in the email. Learn more at [email or web address]  How can I take care of myself?   Get lots of rest. Drink extra fluids (unless a doctor has told you not to).   Take Tylenol (acetaminophen) for fever or pain. If you have liver or kidney problems, ask your family doctor if it's okay to take Tylenol.   Adults can take either:    650 mg (two 325 mg pills) every 4 to 6 hours, or...   1,000 mg (two 500 mg pills) every 8 hours as needed.    Note: Don't take more than 3,000 mg in one day. Acetaminophen is found in many medicines (both prescribed and over-the-counter medicines). Read all labels to be sure you don't take too much.   For children, check the Tylenol bottle for the right dose. The dose is based on the child's age or weight.    If you have other health problems (like cancer, heart failure, an organ transplant or severe kidney disease): Call your specialty clinic if you don't feel  better in the next 2 days.       Know when to call 911. Emergency warning signs include:    Trouble breathing or shortness of breath Pain or pressure in the chest that doesn't go away Feeling confused like you haven't felt before, or not being able to wake up Bluish-colored lips or face.  Where can I get more information?   [facility] -- About COVID-19: [email or web address]   CDC -- What to Do If You're Sick: www.cdc.gov/coronavirus/2019-ncov/about/steps-when-sick.html   CDC -- Ending Home Isolation: www.cdc.gov/coronavirus/2019-ncov/hcp/disposition-in-home-patients.html   CDC -- Caring for Someone: www.cdc.gov/coronavirus/2019-ncov/if-you-are-sick/care-for-someone.html   [facility] -- Interim Guidance for Hospital Discharge to Home: www.[facility].[place]/diseases/coronavirus/hcp/hospdischarge.pdf   [facility] clinical trials (COVID-19 research studies): clinicalaffairs.[facility]-clinical-trials    Below are the COVID-19 hotlines at the [facility] ([facility]). Interpreters are available.    For health questions: Call 828-860-4473 or 1-376.990.9572 (7 a.m. to 7 p.m.) For questions about schools and childcare: Call 914-335-8222 or 1-259.214.1061 (7 a.m. to 7 p.m.)    Diagnosis: Cough  Diagnosis ICD: R05

## 2020-10-11 DIAGNOSIS — Z20.822 ENCOUNTER FOR LABORATORY TESTING FOR COVID-19 VIRUS: Primary | ICD-10-CM

## 2020-10-11 PROCEDURE — U0003 INFECTIOUS AGENT DETECTION BY NUCLEIC ACID (DNA OR RNA); SEVERE ACUTE RESPIRATORY SYNDROME CORONAVIRUS 2 (SARS-COV-2) (CORONAVIRUS DISEASE [COVID-19]), AMPLIFIED PROBE TECHNIQUE, MAKING USE OF HIGH THROUGHPUT TECHNOLOGIES AS DESCRIBED BY CMS-2020-01-R: HCPCS | Performed by: FAMILY MEDICINE

## 2020-10-12 LAB
SARS-COV-2 RNA SPEC QL NAA+PROBE: NOT DETECTED
SPECIMEN SOURCE: NORMAL

## 2020-10-27 ENCOUNTER — OFFICE VISIT - HEALTHEAST (OUTPATIENT)
Dept: FAMILY MEDICINE | Facility: CLINIC | Age: 25
End: 2020-10-27

## 2020-10-27 DIAGNOSIS — F32.1 MODERATE MAJOR DEPRESSION (H): ICD-10-CM

## 2020-10-27 DIAGNOSIS — F41.9 ANXIETY: ICD-10-CM

## 2020-10-27 DIAGNOSIS — F43.12 NIGHTMARES ASSOCIATED WITH CHRONIC POST-TRAUMATIC STRESS DISORDER: ICD-10-CM

## 2020-10-27 DIAGNOSIS — Z79.899 HIGH RISK MEDICATION USE: ICD-10-CM

## 2020-10-27 DIAGNOSIS — R11.0 NAUSEA: ICD-10-CM

## 2020-10-27 DIAGNOSIS — F51.5 NIGHTMARES ASSOCIATED WITH CHRONIC POST-TRAUMATIC STRESS DISORDER: ICD-10-CM

## 2020-11-03 ENCOUNTER — COMMUNICATION - HEALTHEAST (OUTPATIENT)
Dept: FAMILY MEDICINE | Facility: CLINIC | Age: 25
End: 2020-11-03

## 2020-11-07 ENCOUNTER — HEALTH MAINTENANCE LETTER (OUTPATIENT)
Age: 25
End: 2020-11-07

## 2020-11-19 ENCOUNTER — COMMUNICATION - HEALTHEAST (OUTPATIENT)
Dept: FAMILY MEDICINE | Facility: CLINIC | Age: 25
End: 2020-11-19

## 2020-11-23 ENCOUNTER — COMMUNICATION - HEALTHEAST (OUTPATIENT)
Dept: FAMILY MEDICINE | Facility: CLINIC | Age: 25
End: 2020-11-23

## 2020-11-23 DIAGNOSIS — F41.9 ANXIETY: ICD-10-CM

## 2020-11-23 DIAGNOSIS — F43.12 NIGHTMARES ASSOCIATED WITH CHRONIC POST-TRAUMATIC STRESS DISORDER: ICD-10-CM

## 2020-11-23 DIAGNOSIS — F51.5 NIGHTMARES ASSOCIATED WITH CHRONIC POST-TRAUMATIC STRESS DISORDER: ICD-10-CM

## 2020-12-23 ENCOUNTER — MYC MEDICAL ADVICE (OUTPATIENT)
Dept: FAMILY MEDICINE | Facility: CLINIC | Age: 25
End: 2020-12-23

## 2020-12-23 ENCOUNTER — COMMUNICATION - HEALTHEAST (OUTPATIENT)
Dept: FAMILY MEDICINE | Facility: CLINIC | Age: 25
End: 2020-12-23

## 2020-12-23 DIAGNOSIS — F41.9 ANXIETY: ICD-10-CM

## 2020-12-23 DIAGNOSIS — Z79.899 HIGH RISK MEDICATION USE: ICD-10-CM

## 2020-12-23 DIAGNOSIS — F43.12 NIGHTMARES ASSOCIATED WITH CHRONIC POST-TRAUMATIC STRESS DISORDER: ICD-10-CM

## 2020-12-23 DIAGNOSIS — R11.0 NAUSEA: ICD-10-CM

## 2020-12-23 DIAGNOSIS — F51.5 NIGHTMARES ASSOCIATED WITH CHRONIC POST-TRAUMATIC STRESS DISORDER: ICD-10-CM

## 2020-12-24 DIAGNOSIS — F41.9 ANXIETY: Primary | ICD-10-CM

## 2020-12-24 RX ORDER — ALBUTEROL SULFATE 90 UG/1
2 AEROSOL, METERED RESPIRATORY (INHALATION) EVERY 6 HOURS PRN
COMMUNITY
Start: 2019-12-16 | End: 2023-04-24

## 2020-12-24 RX ORDER — BUSPIRONE HYDROCHLORIDE 10 MG/1
10 TABLET ORAL 2 TIMES DAILY
Qty: 60 TABLET | Refills: 3 | Status: SHIPPED | OUTPATIENT
Start: 2020-12-24 | End: 2021-03-23

## 2020-12-24 RX ORDER — BUSPIRONE HYDROCHLORIDE 10 MG/1
10 TABLET ORAL 2 TIMES DAILY
COMMUNITY
Start: 2020-12-19 | End: 2020-12-24

## 2020-12-24 RX ORDER — ALPRAZOLAM 0.25 MG
1 TABLET ORAL
COMMUNITY
Start: 2020-11-24 | End: 2020-12-24

## 2020-12-24 RX ORDER — HYDROXYZINE HYDROCHLORIDE 25 MG/1
25 TABLET, FILM COATED ORAL 3 TIMES DAILY PRN
COMMUNITY
Start: 2019-09-04 | End: 2021-08-31

## 2020-12-24 RX ORDER — ONDANSETRON 4 MG/1
4 TABLET, FILM COATED ORAL 3 TIMES DAILY PRN
COMMUNITY
Start: 2020-04-06 | End: 2021-09-14

## 2020-12-24 RX ORDER — ALPRAZOLAM 0.25 MG
0.25 TABLET ORAL
Qty: 30 TABLET | Refills: 0 | Status: SHIPPED | OUTPATIENT
Start: 2020-12-24 | End: 2021-01-05

## 2020-12-24 RX ORDER — PRAZOSIN HYDROCHLORIDE 1 MG/1
1-2 CAPSULE ORAL
COMMUNITY
Start: 2020-11-24 | End: 2021-01-05

## 2021-01-05 ENCOUNTER — VIRTUAL VISIT (OUTPATIENT)
Dept: FAMILY MEDICINE | Facility: CLINIC | Age: 26
End: 2021-01-05
Payer: COMMERCIAL

## 2021-01-05 DIAGNOSIS — F41.9 ANXIETY: Primary | ICD-10-CM

## 2021-01-05 PROCEDURE — 99204 OFFICE O/P NEW MOD 45 MIN: CPT | Mod: 95 | Performed by: FAMILY MEDICINE

## 2021-01-05 PROCEDURE — 96127 BRIEF EMOTIONAL/BEHAV ASSMT: CPT | Performed by: FAMILY MEDICINE

## 2021-01-05 RX ORDER — DIAPHRAGMS, CONTOURED 65 MM-80MM
1 DIAPHRAGM VAGINAL
COMMUNITY
Start: 2020-09-11 | End: 2021-09-14

## 2021-01-05 RX ORDER — PRAZOSIN HYDROCHLORIDE 1 MG/1
1-2 CAPSULE ORAL
Qty: 60 CAPSULE | Refills: 4 | Status: SHIPPED | OUTPATIENT
Start: 2021-01-05 | End: 2021-07-23

## 2021-01-05 RX ORDER — CLONAZEPAM 0.5 MG/1
0.5 TABLET ORAL 2 TIMES DAILY PRN
Qty: 30 TABLET | Refills: 0 | Status: SHIPPED | OUTPATIENT
Start: 2021-01-05 | End: 2021-02-10

## 2021-01-05 RX ORDER — NORETHINDRONE 0.35 MG
0.35 KIT ORAL DAILY
COMMUNITY
Start: 2020-12-29 | End: 2022-07-20

## 2021-01-05 ASSESSMENT — ANXIETY QUESTIONNAIRES
GAD7 TOTAL SCORE: 18
6. BECOMING EASILY ANNOYED OR IRRITABLE: NEARLY EVERY DAY
2. NOT BEING ABLE TO STOP OR CONTROL WORRYING: NEARLY EVERY DAY
IF YOU CHECKED OFF ANY PROBLEMS ON THIS QUESTIONNAIRE, HOW DIFFICULT HAVE THESE PROBLEMS MADE IT FOR YOU TO DO YOUR WORK, TAKE CARE OF THINGS AT HOME, OR GET ALONG WITH OTHER PEOPLE: VERY DIFFICULT
5. BEING SO RESTLESS THAT IT IS HARD TO SIT STILL: MORE THAN HALF THE DAYS
1. FEELING NERVOUS, ANXIOUS, OR ON EDGE: NEARLY EVERY DAY
3. WORRYING TOO MUCH ABOUT DIFFERENT THINGS: NEARLY EVERY DAY
7. FEELING AFRAID AS IF SOMETHING AWFUL MIGHT HAPPEN: MORE THAN HALF THE DAYS

## 2021-01-05 ASSESSMENT — PATIENT HEALTH QUESTIONNAIRE - PHQ9
SUM OF ALL RESPONSES TO PHQ QUESTIONS 1-9: 18
5. POOR APPETITE OR OVEREATING: MORE THAN HALF THE DAYS

## 2021-01-05 NOTE — PROGRESS NOTES
Britney Singh is a 25 year old female who is being evaluated via a billable video visit.      How would you like to obtain your AVS? MyChart  If the video visit is dropped, the invitation should be resent by: Text to cell phone: [phone]  Will anyone else be joining your video visit? No      Video Start Time: 9:29 AM      -------------------------    Assessment/Plan:    Britney Singh is a 25 year old female presenting for:    Anxiety  We will stop the low-dose Xanax and start low-dose Klonopin that she can use occasionally.  Hopefully this will be a little bit longer lasting.  Refill praises and sent to the pharmacy.  She will continue her BuSpar as well.      - clonazePAM (KLONOPIN) 0.5 MG tablet  Dispense: 30 tablet; Refill: 0  - prazosin (MINIPRESS) 1 MG capsule  Dispense: 60 capsule; Refill: 4        Medications Discontinued During This Encounter   Medication Reason     ALPRAZolam (XANAX) 0.25 MG tablet      prazosin (MINIPRESS) 1 MG capsule Reorder           Chief Complaint:  Recheck Medication and Referral          Subjective:   Britney Singh is a pleasant 25 year old female being evaluated via video visit today for the following concern/s:     Anxiety: Patient has a past medical history significant for depression and anxiety.  She has been feeling so anxious about a month ago that she was having some suicidal ideations.  She does have 2 therapist who she sees.  One of them specializes in PTSD type symptoms.    She was recently started on prazosin for nightmares.  This has been helpful for her.  Her nightmares have almost completely abated.    Otherwise, she has been taking Xanax only occasionally.  She does note that she generally takes this at night.  She is wondering if there is something longer lasting that she can take it that might help her sleep a bit better throughout the night.    She no longer has any suicidal ideations.  She taken a leave of absence from work which runs out in February.   She is unsure if she is going back.      12 point review of systems completed and negative except for what has been described above    History   Smoking Status     Former Smoker     Quit date: 1/7/2014   Smokeless Tobacco     Never Used         Current Outpatient Medications:      albuterol (PROAIR HFA) 108 (90 BASE) MCG/ACT inhaler, Inhale 2 puffs into the lungs every 6 hours as needed., Disp: , Rfl:      albuterol (PROAIR HFA/PROVENTIL HFA/VENTOLIN HFA) 108 (90 Base) MCG/ACT inhaler, Inhale 2 puffs into the lungs every 6 hours as needed, Disp: , Rfl:      busPIRone (BUSPAR) 10 MG tablet, Take 1 tablet (10 mg) by mouth 2 times daily, Disp: 60 tablet, Rfl: 3     clonazePAM (KLONOPIN) 0.5 MG tablet, Take 1 tablet (0.5 mg) by mouth 2 times daily as needed for anxiety, Disp: 30 tablet, Rfl: 0     DEBLITANE 0.35 MG tablet, Take 0.35 mg by mouth daily, Disp: , Rfl:      Diaphragm Arc-Spring (CAYA) DPRH, Place 1 Units vaginally, Disp: , Rfl:      hydrOXYzine (ATARAX) 25 MG tablet, Take 25 mg by mouth 3 times daily as needed, Disp: , Rfl:      ondansetron (ZOFRAN) 4 MG tablet, Take 4 mg by mouth 3 times daily as needed, Disp: , Rfl:      prazosin (MINIPRESS) 1 MG capsule, Take 1-2 capsules (1-2 mg) by mouth nightly as needed, Disp: 60 capsule, Rfl: 4        Objective:  No vitals were done due to the nature of this visit  No flowsheet data found.            General: No acute distress  Psych: Appropriate affect  HEENT: moist mucous membranes  Pulmonary: Breathing comfortably, speaking in complete sentences  Extremities: warm and well perfused with no edema  Skin: warm and dry with no rash       This note has been dictated and transcribed using voice recognition software.   Any errors in transcription are unintentional and inherent to the software.      Video-Visit Details    Type of service:  Video Visit    Video End Time:1000    Originating Location (pt. Location): Home    Distant Location (provider location):  [facility]     Platform used for Video Visit: Luk[employer]

## 2021-01-06 ASSESSMENT — ANXIETY QUESTIONNAIRES: GAD7 TOTAL SCORE: 18

## 2021-01-21 ENCOUNTER — AMBULATORY - HEALTHEAST (OUTPATIENT)
Dept: NURSING | Facility: CLINIC | Age: 26
End: 2021-01-21

## 2021-02-03 ENCOUNTER — MYC MEDICAL ADVICE (OUTPATIENT)
Dept: FAMILY MEDICINE | Facility: CLINIC | Age: 26
End: 2021-02-03

## 2021-02-03 PROBLEM — F43.10 POSTTRAUMATIC STRESS DISORDER: Status: ACTIVE | Noted: 2021-02-03

## 2021-02-03 PROBLEM — F32.1 MODERATE MAJOR DEPRESSION (H): Status: ACTIVE | Noted: 2021-02-03

## 2021-02-03 RX ORDER — ALPRAZOLAM 0.25 MG
TABLET ORAL
COMMUNITY
Start: 2020-12-24 | End: 2021-08-24

## 2021-02-05 ENCOUNTER — OFFICE VISIT (OUTPATIENT)
Dept: FAMILY MEDICINE | Facility: CLINIC | Age: 26
End: 2021-02-05
Payer: COMMERCIAL

## 2021-02-05 VITALS
DIASTOLIC BLOOD PRESSURE: 72 MMHG | HEART RATE: 72 BPM | RESPIRATION RATE: 16 BRPM | BODY MASS INDEX: 24.14 KG/M2 | SYSTOLIC BLOOD PRESSURE: 118 MMHG | WEIGHT: 163 LBS | TEMPERATURE: 96.6 F | HEIGHT: 69 IN

## 2021-02-05 DIAGNOSIS — N89.8 VAGINAL DISCHARGE: Primary | ICD-10-CM

## 2021-02-05 DIAGNOSIS — F50.9 EATING DISORDER, UNSPECIFIED TYPE: ICD-10-CM

## 2021-02-05 LAB
ALBUMIN SERPL-MCNC: 4.3 G/DL (ref 3.4–5)
ALP SERPL-CCNC: 56 U/L (ref 40–150)
ALT SERPL W P-5'-P-CCNC: 19 U/L (ref 0–50)
ANION GAP SERPL CALCULATED.3IONS-SCNC: 7 MMOL/L (ref 3–14)
AST SERPL W P-5'-P-CCNC: 15 U/L (ref 0–45)
BILIRUB SERPL-MCNC: 1 MG/DL (ref 0.2–1.3)
BUN SERPL-MCNC: 12 MG/DL (ref 7–30)
CALCIUM SERPL-MCNC: 8.9 MG/DL (ref 8.5–10.1)
CHLORIDE SERPL-SCNC: 107 MMOL/L (ref 94–109)
CO2 SERPL-SCNC: 27 MMOL/L (ref 20–32)
CREAT SERPL-MCNC: 0.64 MG/DL (ref 0.52–1.04)
ERYTHROCYTE [DISTWIDTH] IN BLOOD BY AUTOMATED COUNT: 12.1 % (ref 10–15)
GFR SERPL CREATININE-BSD FRML MDRD: >90 ML/MIN/{1.73_M2}
GLUCOSE SERPL-MCNC: 75 MG/DL (ref 70–99)
HCT VFR BLD AUTO: 41.5 % (ref 35–47)
HGB BLD-MCNC: 14.1 G/DL (ref 11.7–15.7)
MCH RBC QN AUTO: 31.7 PG (ref 26.5–33)
MCHC RBC AUTO-ENTMCNC: 34 G/DL (ref 31.5–36.5)
MCV RBC AUTO: 93 FL (ref 78–100)
PLATELET # BLD AUTO: 292 10E9/L (ref 150–450)
POTASSIUM SERPL-SCNC: 3.7 MMOL/L (ref 3.4–5.3)
PROT SERPL-MCNC: 7.6 G/DL (ref 6.8–8.8)
RBC # BLD AUTO: 4.45 10E12/L (ref 3.8–5.2)
SODIUM SERPL-SCNC: 141 MMOL/L (ref 133–144)
SPECIMEN SOURCE: ABNORMAL
TSH SERPL DL<=0.005 MIU/L-ACNC: 1 MU/L (ref 0.4–4)
WBC # BLD AUTO: 4.8 10E9/L (ref 4–11)
WET PREP SPEC: ABNORMAL

## 2021-02-05 PROCEDURE — 87210 SMEAR WET MOUNT SALINE/INK: CPT | Performed by: FAMILY MEDICINE

## 2021-02-05 PROCEDURE — 93000 ELECTROCARDIOGRAM COMPLETE: CPT | Performed by: FAMILY MEDICINE

## 2021-02-05 PROCEDURE — 85027 COMPLETE CBC AUTOMATED: CPT | Performed by: FAMILY MEDICINE

## 2021-02-05 PROCEDURE — 99214 OFFICE O/P EST MOD 30 MIN: CPT | Performed by: FAMILY MEDICINE

## 2021-02-05 PROCEDURE — 80053 COMPREHEN METABOLIC PANEL: CPT | Performed by: FAMILY MEDICINE

## 2021-02-05 PROCEDURE — 36415 COLL VENOUS BLD VENIPUNCTURE: CPT | Performed by: FAMILY MEDICINE

## 2021-02-05 PROCEDURE — 84443 ASSAY THYROID STIM HORMONE: CPT | Performed by: FAMILY MEDICINE

## 2021-02-05 ASSESSMENT — MIFFLIN-ST. JEOR: SCORE: 1538.74

## 2021-02-05 NOTE — PROGRESS NOTES
Assessment/Plan:    Britney Singh is a 25 year old female presenting for:    Vaginal discharge  Wet prep done today.  Mild clue cells seen.  Given that patient symptoms have abated would recommend holding off on antibiotics unless symptoms return.  She will contact me if this is the case.  - Wet prep    Eating disorder, unspecified type  She is currently working with a therapist and a dietitian.  Labs below will be done.  EKG was personally reviewed and found to be normal.  Will await rest of lab results.  She will let me know if she needs me to fax them anywhere.  - EKG 12-lead complete w/read - Clinics  - Comprehensive metabolic panel (BMP + Alb, Alk Phos, ALT, AST, Total. Bili, TP)  - TSH with free T4 reflex  - CBC with platelets      Medications Discontinued During This Encounter   Medication Reason     albuterol (PROAIR HFA) 108 (90 BASE) MCG/ACT inhaler            Chief Complaint:  Vaginal Problem and Eating Disorder        Subjective:   Britney Singh is a pleasant 25-year-old female presenting to the clinic today for follow-up.    1.  Anxiety: Patient has a history of anxiety.  We have been working with her on this.  She is currently on BuSpar twice daily which she feels is helping.  She does use Klonopin on an as-needed basis as well but she tries to limit this.  She is working with 2 therapists which has been helpful.  She has been having some nightmares and was started on prazosin and she feels as though that is beneficial as well.      2.  Vaginal discharge: Patient has a history of BV.  She has been noticing some vaginal discharge over the last few days.  It actually seems a bit better today.  There was a slight odor to it and she thinks that this is akin to BV that she has had in the past.  Mild itching.    3.  Eating disorder: Patient opens up to me today that she is once again struggling with an eating disorder.  She has had anorexia in the past and done inpatient treatment for this.  She states  "that recently things have become worse again.  She recently discussed this with her therapist and is talking with a dietitian.  She is hopeful to do outpatient treatment for dietitian recommended that she come in and get some labs drawn.    12 point review of systems completed and negative except for what has been described above    History   Smoking Status     Former Smoker     Quit date: 1/7/2014   Smokeless Tobacco     Never Used         Current Outpatient Medications:      albuterol (PROAIR HFA/PROVENTIL HFA/VENTOLIN HFA) 108 (90 Base) MCG/ACT inhaler, Inhale 2 puffs into the lungs every 6 hours as needed, Disp: , Rfl:      ALPRAZolam (XANAX) 0.25 MG tablet, TAKE 1 TABLET BY MOUTH NIGHTLY AS NEEDED, Disp: , Rfl:      busPIRone (BUSPAR) 10 MG tablet, Take 1 tablet (10 mg) by mouth 2 times daily, Disp: 60 tablet, Rfl: 3     clonazePAM (KLONOPIN) 0.5 MG tablet, Take 1 tablet (0.5 mg) by mouth 2 times daily as needed for anxiety, Disp: 30 tablet, Rfl: 0     DEBLITANE 0.35 MG tablet, Take 0.35 mg by mouth daily, Disp: , Rfl:      Diaphragm Arc-Spring (CAYA) DPRH, Place 1 Units vaginally, Disp: , Rfl:      hydrOXYzine (ATARAX) 25 MG tablet, Take 25 mg by mouth 3 times daily as needed, Disp: , Rfl:      ondansetron (ZOFRAN) 4 MG tablet, Take 4 mg by mouth 3 times daily as needed, Disp: , Rfl:      prazosin (MINIPRESS) 1 MG capsule, Take 1-2 capsules (1-2 mg) by mouth nightly as needed, Disp: 60 capsule, Rfl: 4      Objective:  Vitals:    02/05/21 1005   BP: 118/72   Pulse: 72   Resp: 16   Temp: 96.6  F (35.9  C)   TempSrc: Tympanic   Weight: 73.9 kg (163 lb)   Height: 1.753 m (5' 9\")       Body mass index is 24.07 kg/m .    Vital signs reviewed and stable  General: No acute distress  Psych: Appropriate affect  HEENT: moist mucous membranes, pupils equal, round, reactive to light and accomodation, tympanic membranes are pearly grey bilaterally  Lymph: no cervical or supraclavicular lymphadenopathy  Cardiovascular: " regular rate and rhythm with no murmur  Pulmonary: clear to auscultation bilaterally with no wheeze  Abdomen: soft, non tender, non distended with normo-active bowel sounds  Extremities: warm and well perfused with no edema  Skin: warm and dry with no rash         This note has been dictated and transcribed using voice recognition software.   Any errors in transcription are unintentional and inherent to the software.

## 2021-02-10 ENCOUNTER — MYC REFILL (OUTPATIENT)
Dept: FAMILY MEDICINE | Facility: CLINIC | Age: 26
End: 2021-02-10

## 2021-02-10 ENCOUNTER — MYC MEDICAL ADVICE (OUTPATIENT)
Dept: FAMILY MEDICINE | Facility: CLINIC | Age: 26
End: 2021-02-10

## 2021-02-10 DIAGNOSIS — F41.9 ANXIETY: ICD-10-CM

## 2021-02-10 RX ORDER — CLONAZEPAM 0.5 MG/1
0.5 TABLET ORAL 2 TIMES DAILY PRN
Qty: 30 TABLET | Refills: 0 | Status: SHIPPED | OUTPATIENT
Start: 2021-02-10 | End: 2021-03-23

## 2021-02-11 ENCOUNTER — AMBULATORY - HEALTHEAST (OUTPATIENT)
Dept: NURSING | Facility: CLINIC | Age: 26
End: 2021-02-11

## 2021-03-23 ENCOUNTER — VIRTUAL VISIT (OUTPATIENT)
Dept: FAMILY MEDICINE | Facility: CLINIC | Age: 26
End: 2021-03-23
Payer: COMMERCIAL

## 2021-03-23 DIAGNOSIS — G43.809 OTHER MIGRAINE WITHOUT STATUS MIGRAINOSUS, NOT INTRACTABLE: ICD-10-CM

## 2021-03-23 DIAGNOSIS — F32.1 MODERATE MAJOR DEPRESSION (H): ICD-10-CM

## 2021-03-23 DIAGNOSIS — F41.9 ANXIETY: Primary | ICD-10-CM

## 2021-03-23 DIAGNOSIS — Z30.41 ENCOUNTER FOR SURVEILLANCE OF CONTRACEPTIVE PILLS: ICD-10-CM

## 2021-03-23 PROCEDURE — 99214 OFFICE O/P EST MOD 30 MIN: CPT | Mod: 95 | Performed by: FAMILY MEDICINE

## 2021-03-23 RX ORDER — CLONAZEPAM 0.5 MG/1
0.5 TABLET ORAL 2 TIMES DAILY PRN
Qty: 30 TABLET | Refills: 0 | Status: SHIPPED | OUTPATIENT
Start: 2021-03-23 | End: 2021-05-26

## 2021-03-23 RX ORDER — SUMATRIPTAN 50 MG/1
50 TABLET, FILM COATED ORAL
Qty: 10 TABLET | Refills: 3 | Status: SHIPPED | OUTPATIENT
Start: 2021-03-23 | End: 2021-07-23

## 2021-03-23 NOTE — PROGRESS NOTES
Britney is a 25 year old who is being evaluated via a billable video visit.      How would you like to obtain your AVS? Abby  If the video visit is dropped, the invitation should be resent by: Abby  Will anyone else be joining your video visit? No      Video Start Time: 220    -------------------------    Assessment/Plan:    Britney Singh is a 25 year old female presenting for:    Anxiety  Refill clonazepam.  She is not taking BuSpar as she did not think that this was helping.  She continues to use prazosin at night.  She has tried pretty much all of the SSRIs and SNRIs.  She has not tried Pristiq but did very poorly with Effexor.  She has seen psychiatrist in the past and does not think she would like to see them again.  - clonazePAM (KLONOPIN) 0.5 MG tablet  Dispense: 30 tablet; Refill: 0    Moderate major depression (H)  She is continuing to see the therapist.    Other migraine without status migrainosus, not intractable  She will try Imitrex that she has never tried an abortive migraine medication before.  Discussed that this might make her drowsy.  - SUMAtriptan (IMITREX) 50 MG tablet  Dispense: 10 tablet; Refill: 3    Encounter for surveillance of contraceptive pills  She not overly pleased with her progesterone only birth control but given her migraines with aura she has not eligible for estrogen.  She has tried other methods as well which have not been helpful for her.  She will continue this form and let me know if she has any further questions or concerns.        Medications Discontinued During This Encounter   Medication Reason     clonazePAM (KLONOPIN) 0.5 MG tablet Reorder     busPIRone (BUSPAR) 10 MG tablet            Chief Complaint:  Recheck Medication, Contraception, Headache, and Mental Health Problem          Subjective:   Britney Singh is a pleasant 25 year old female being evaluated via video visit today for the following concern/s:     Med check for anxiety/depression: Patient has a  past medical history significant for fairly severe anxiety depression.  She has tried many of the SSRIs/SNRIs.  She seen psychiatrist who put her on atypical antipsychotic medications which made her very drowsy.  She has not tolerated any of the medications well.  We will place her on BuSpar at one point a few months ago which she seemed to tolerate well initially but then she felt as though it is making her drowsy.  She stopped taking this medication.  She does use clonazepam occasionally and probably uses 10 to 15 tablets/month.  This is a very low dose and she is doing well with it.  She is hopeful to not need it but the COVID-19 pandemic has flared her anxiety significantly.    In addition, she sees two psychologist.  She also takes prazosin at night.    No current suicidal or homicidal ideations although about 6 months ago there were some concerns regarding ideations of self-harm    The patient would also like to discuss her birth control.  She is currently on progesterone only.  She is getting menstrual cycles fairly regularly about twice per month.  She does not qualify for estrogen-containing contraceptives due to her migraine with aura.  She has tried it did IUD as well as the Depo shot but did not like either of them.    Finally, patient would like to discuss her migraines.  She will get these once or twice a month.  She is wondering about a migraine abortive medication she is never tried 1 of these in the past.      12 point review of systems completed and negative except for what has been described above    History   Smoking Status     Former Smoker     Quit date: 1/7/2014   Smokeless Tobacco     Never Used         Current Outpatient Medications:      albuterol (PROAIR HFA/PROVENTIL HFA/VENTOLIN HFA) 108 (90 Base) MCG/ACT inhaler, Inhale 2 puffs into the lungs every 6 hours as needed, Disp: , Rfl:      ALPRAZolam (XANAX) 0.25 MG tablet, TAKE 1 TABLET BY MOUTH NIGHTLY AS NEEDED, Disp: , Rfl:       clonazePAM (KLONOPIN) 0.5 MG tablet, Take 1 tablet (0.5 mg) by mouth 2 times daily as needed for anxiety, Disp: 30 tablet, Rfl: 0     DEBLITANE 0.35 MG tablet, Take 0.35 mg by mouth daily, Disp: , Rfl:      Diaphragm Arc-Spring (CAYA) DPRH, Place 1 Units vaginally, Disp: , Rfl:      hydrOXYzine (ATARAX) 25 MG tablet, Take 25 mg by mouth 3 times daily as needed, Disp: , Rfl:      metroNIDAZOLE (METROGEL) 0.75 % vaginal gel, 1 applicator vaginally daily for 7 days and then twice weekly for 3 months, Disp: 140 g, Rfl: 1     ondansetron (ZOFRAN) 4 MG tablet, Take 4 mg by mouth 3 times daily as needed, Disp: , Rfl:      prazosin (MINIPRESS) 1 MG capsule, Take 1-2 capsules (1-2 mg) by mouth nightly as needed, Disp: 60 capsule, Rfl: 4     SUMAtriptan (IMITREX) 50 MG tablet, Take 1 tablet (50 mg) by mouth at onset of headache for migraine May repeat in 2 hours. Max 4 tablets/24 hours., Disp: 10 tablet, Rfl: 3        Objective:  No vitals were done due to the nature of this visit  No flowsheet data found.            General: No acute distress  Psych: Appropriate affect  HEENT: moist mucous membranes  Pulmonary: Breathing comfortably, speaking in complete sentences  Extremities: warm and well perfused with no edema  Skin: warm and dry with no rash         This note has been dictated and transcribed using voice recognition software.   Any errors in transcription are unintentional and inherent to the software.      Video-Visit Details    Type of service:  Video Visit    Video End Time:255    Originating Location (pt. Location): Home    Distant Location (provider location):  [facility]     Platform used for Video Visit: [employer]

## 2021-05-11 ENCOUNTER — MYC MEDICAL ADVICE (OUTPATIENT)
Dept: FAMILY MEDICINE | Facility: CLINIC | Age: 26
End: 2021-05-11

## 2021-05-26 ENCOUNTER — MYC REFILL (OUTPATIENT)
Dept: FAMILY MEDICINE | Facility: CLINIC | Age: 26
End: 2021-05-26

## 2021-05-26 DIAGNOSIS — F41.9 ANXIETY: ICD-10-CM

## 2021-05-26 ASSESSMENT — PATIENT HEALTH QUESTIONNAIRE - PHQ9: SUM OF ALL RESPONSES TO PHQ QUESTIONS 1-9: 15

## 2021-05-27 RX ORDER — CLONAZEPAM 0.5 MG/1
0.5 TABLET ORAL 2 TIMES DAILY PRN
Qty: 30 TABLET | Refills: 0 | Status: SHIPPED | OUTPATIENT
Start: 2021-05-27 | End: 2021-06-26

## 2021-05-27 ASSESSMENT — PATIENT HEALTH QUESTIONNAIRE - PHQ9: SUM OF ALL RESPONSES TO PHQ QUESTIONS 1-9: 16

## 2021-05-27 NOTE — TELEPHONE ENCOUNTER
Routing refill request to provider for review/approval because:  Drug not on the [facility] refill protocol   Haritha Thornton RN

## 2021-05-28 ASSESSMENT — ANXIETY QUESTIONNAIRES
GAD7 TOTAL SCORE: 17
GAD7 TOTAL SCORE: 20

## 2021-05-28 ASSESSMENT — ASTHMA QUESTIONNAIRES: ACT_TOTALSCORE: 24

## 2021-05-30 VITALS — BODY MASS INDEX: 22.96 KG/M2 | WEIGHT: 155 LBS | HEIGHT: 69 IN

## 2021-05-30 VITALS — BODY MASS INDEX: 22.96 KG/M2 | HEIGHT: 69 IN | WEIGHT: 155 LBS

## 2021-05-30 VITALS — WEIGHT: 154 LBS | BODY MASS INDEX: 22.74 KG/M2

## 2021-05-31 VITALS — WEIGHT: 160 LBS | BODY MASS INDEX: 23.7 KG/M2 | HEIGHT: 69 IN

## 2021-05-31 VITALS — BODY MASS INDEX: 24.05 KG/M2 | WEIGHT: 162.38 LBS | HEIGHT: 69 IN

## 2021-05-31 VITALS — BODY MASS INDEX: 23.98 KG/M2 | HEIGHT: 69 IN

## 2021-06-01 VITALS — BODY MASS INDEX: 24.97 KG/M2 | HEIGHT: 69 IN | WEIGHT: 168.56 LBS

## 2021-06-01 VITALS — HEIGHT: 69 IN | BODY MASS INDEX: 25.38 KG/M2 | WEIGHT: 171.38 LBS

## 2021-06-01 VITALS — WEIGHT: 164.6 LBS | BODY MASS INDEX: 24.38 KG/M2 | HEIGHT: 69 IN

## 2021-06-01 NOTE — PROGRESS NOTES
Assessment/Plan:     Health maintenance female exam.  All questions answered.  PAP UTD  Breast self exam technique reviewed and patient encouraged to perform self-exam monthly.  Discussed healthy lifestyle modifications.  Await non-fasting lab results    1. Routine general medical examination at a health care facility  - Hemoglobin  - Lipid [place]ascade RANDOM  - Glycosylated Hemoglobin A1c  - Influenza,Seasonal,Quad,INJ =/>6months    2. Contraception  The patient believes that she might stop taking the progesterone only contraceptive.  Her and her female partner are thinking that they may try to become pregnant.  This will be accomplished by intercourse with a sperm donor who is a friend of theirs.  She is unsure if she will have intercourse with him prior to wanting to become pregnant and was hopeful to get the diaphragm below sent to the pharmacy as well as spermicide gel.  - diaphragms, contoured (CAYA CONTOURED) 65-80 mm Dprh; Insert 1 Units into the vagina daily as needed. Prior to intercourse.  Dispense: 1 each; Refill: 0  - nonoxynol-9 4 % Gel; Use with intercourse  Dispense: 1 Tube; Refill: 3    3. Anxiety  Refill her depression and anxiety seems not well controlled.  She is seeing a counselor.-She does not feel though she would like to try any daily medications at this time.  She has tried a myriad of things in the past and nothing has really worked.  She has seen several psychiatrists in the past as well.  - hydrOXYzine HCl (ATARAX) 25 MG tablet; Take 1 tablet (25 mg total) by mouth 3 (three) times a day as needed for anxiety.  Dispense: 30 tablet; Refill: 2    4. Nausea  refill  - hydrOXYzine HCl (ATARAX) 25 MG tablet; Take 1 tablet (25 mg total) by mouth 3 (three) times a day as needed for anxiety.  Dispense: 30 tablet; Refill: 2    6. Screen for STD (sexually transmitted disease)  - HIV Antigen/Antibody Screening [place]  - Syphilis Screen, Cascade  - Chlamydia trachomatis & Neisseria gonorrhoeae,  "Amplified Detection; Future          Subjective:      Britney Singh is a 23 y.o. female who presents for an annual exam.  She is overall doing well.  She has a daughter who is 3.  Her partner, Karen, and her daughter lives with her.  She has a history of depression and anxiety.  She is currently only using hydroxyzine occasionally for her anxiety.  She states that she feels as though she has things \"under control\" however based on her PHQ 9 her depression is not under control.  She does not feel though she would like to be on a daily medication for depression.  She does see a counselor.    Her and her partner are considering becoming pregnant again.  This will be accomplished by having intercourse with their children sperm donor who is a friend.  She is unsure if she will be having intercourse with this person prior to wanting to become pregnant and thus is hopeful to get a prescription for a diaphragm and spermicide today.  She states that she would use a condom as well.    Healthy Habits:   Regular Exercise: no  Sunscreen Use: Yes  Healthy Diet: Yes  Dental Visits Regularly: Yes  Seat Belt: Yes  Sexually active: Yes  Self Breast Exam Monthly:Yes  Colonoscopy: N/A  Prevention of Osteoporosis: Yes  Last Dexa: N/A    Immunization History   Administered Date(s) Administered     DTaP, historic 02/15/1994, 04/15/1994, 06/27/1994, 06/15/1995, 05/19/1999     HPV Quadrivalent 11/20/2007, 03/26/2008, 07/22/2008     Hep A, historic 11/20/2007, 08/12/2010     Hep B, historic 02/15/1994, 03/25/1994, 10/13/1994, 08/08/2011     IPV 02/15/1994, 04/15/1994, 06/27/1994, 06/15/1995     Influenza, inj, historic,unspecified 1993, 11/12/2005, 12/13/2005, 11/08/2006, 11/20/2007, 09/21/2009, 11/04/2010, 10/14/2013, 11/02/2016     Influenza,seasonal,quad inj =/> 6months 09/22/2015, 10/04/2017, 10/01/2018, 09/04/2019     MMR 03/13/1995, 05/19/1999     Meningococcal MCV4P 09/19/2005     Pneumo Polysac 23-V 10/14/2013     " Td,adult,historic,unspecified 2005     Tdap 1900, 2015     Immunization status: up to date and documented.    Gynecologic History  Patient's last menstrual period was 2019 (exact date).  Contraception: oral progesterone-only contraceptive  Last Pap: . Results were: normal  Last mammogram: n/a.       OB History    Para Term  AB Living   2 1 1   1 1   SAB TAB Ectopic Multiple Live Births   1       1      # Outcome Date GA Lbr Fredy/2nd Weight Sex Delivery Anes PTL Lv   2 Term 08/13/15 41w3d 02:50 / 02:16 8 lb 14 oz (4.026 kg) F Vag-Spont Spinal N AFSANEH   1 SAB 14               Current Outpatient Medications   Medication Sig Dispense Refill     albuterol (PROAIR HFA;PROVENTIL HFA;VENTOLIN HFA) 90 mcg/actuation inhaler Inhale 2 puffs every 4 (four) hours as needed for wheezing. 1 Inhaler 0     condoms, female (FC2 FEMALE CONDOM MISC)   0     hydrOXYzine HCl (ATARAX) 25 MG tablet Take 1 tablet (25 mg total) by mouth 3 (three) times a day as needed for anxiety. 30 tablet 2     LORazepam (ATIVAN) 0.5 MG tablet Take 0.5 tablets (0.25 mg total) by mouth daily as needed for anxiety. 20 tablet 0     MY WAY tablet   0     norethindrone (MICRONOR) 0.35 mg tablet   0     ondansetron (ZOFRAN) 4 MG tablet Take 1 tablet (4 mg total) by mouth daily as needed for nausea. 20 tablet 0     diaphragms, contoured (CAYA CONTOURED) 65-80 mm Dprh Insert 1 Units into the vagina daily as needed. Prior to intercourse. 1 each 0     nonoxynol-9 4 % Gel Use with intercourse 1 Tube 3     No current facility-administered medications for this visit.      Past Medical History:   Diagnosis Date     Asthma      Borderline personality disorder (H)      Depression      Eating disorder      PTSD (post-traumatic stress disorder)      Past Surgical History:   Procedure Laterality Date     WISDOM TOOTH EXTRACTION       Amoxicillin  Family History   Problem Relation Age of Onset     Breast cancer Paternal  "Grandmother         dx age 70     Diabetes type II Father      Hypertension Father      Hemochromatosis Father      Heart disease Maternal Grandmother      Mental illness Mother      Thyroid disease Mother      Skin cancer Maternal Grandfather      Social History     Socioeconomic History     Marital status:      Spouse name: Not on file     Number of children: Not on file     Years of education: Not on file     Highest education level: Not on file   Occupational History     Not on file   Social Needs     Financial resource strain: Not on file     Food insecurity:     Worry: Not on file     Inability: Not on file     Transportation needs:     Medical: Not on file     Non-medical: Not on file   Tobacco Use     Smoking status: Former Smoker     Smokeless tobacco: Never Used   Substance and Sexual Activity     Alcohol use: No     Drug use: No     Sexual activity: Yes     Partners: Female   Lifestyle     Physical activity:     Days per week: Not on file     Minutes per session: Not on file     Stress: Not on file   Relationships     Social connections:     Talks on phone: Not on file     Gets together: Not on file     Attends Rastafarian service: Not on file     Active member of club or organization: Not on file     Attends meetings of clubs or organizations: Not on file     Relationship status: Not on file     Intimate partner violence:     Fear of current or ex partner: Not on file     Emotionally abused: Not on file     Physically abused: Not on file     Forced sexual activity: Not on file   Other Topics Concern     Not on file   Social History Narrative     Not on file       Review of Systems  12 point review of systems was completed and found to be negative except for what is been stated above.      Objective:         Vitals:    09/04/19 1400   BP: 102/62   Pulse: 80   Resp: 16   Temp: 98.1  F (36.7  C)   TempSrc: Oral   Weight: 155 lb 3 oz (70.4 kg)   Height: 5' 9.29\" (1.76 m)       Physical Exam:  General " Appearance: Alert, cooperative, no distress, appears stated age   Head: Normocephalic, without obvious abnormality, atraumatic  Eyes: PERRL, conjunctiva/corneas clear, EOM's intact   Ears: Normal TM's and external ear canals, both ears  Nose:Nares normal, septum midline,mucosa normal, no drainage    Throat:Lips, mucosa, and tongue normal; teeth and gums normal  Neck: Supple, symmetrical, trachea midline, no adenopathy;  thyroid: not enlarged, symmetric, no tenderness/mass/nodules  Back: Symmetric, no curvature, ROM normal,  Lungs: Clear to auscultation bilaterally, respirations unlabored  Breasts: No breast masses, tenderness, asymmetry, or nipple discharge.  Heart: Regular rate and rhythm, S1 and S2 normal, no murmur, rub, or gallop  Abdomen: Soft, non-tender, bowel sounds active all four quadrants,  no masses, no organomegaly  Extremities: Extremities normal, atraumatic, no cyanosis or edema  Skin: Skin color, texture, turgor normal, no rashes or lesions  Lymph nodes: Cervical, supraclavicular, and axillary nodes normal and   Neurologic: Normal

## 2021-06-02 VITALS — WEIGHT: 163.06 LBS | BODY MASS INDEX: 24.15 KG/M2 | HEIGHT: 69 IN

## 2021-06-02 VITALS — WEIGHT: 163.19 LBS | BODY MASS INDEX: 24.17 KG/M2 | HEIGHT: 69 IN

## 2021-06-02 NOTE — TELEPHONE ENCOUNTER
RN cannot approve Refill Request    RN can NOT refill this medication medication not on med list. Last office visit: 10/30/2018 Anushka Arnold MD Last Physical: 9/4/2019 Last MTM visit: Visit date not found Last visit same specialty: 10/30/2018 Anushka Arnold MD.  Next visit within 3 mo: Visit date not found  Next physical within 3 mo: Visit date not found      Roxanne Paulson, [facility] Connection Triage/Med Refill 10/18/2019    Requested Prescriptions   Pending Prescriptions Disp Refills     MONO-LINYAH 0.25-35 mg-mcg per tablet [Pharmacy Med Name: MONO-LINYAH 28-DAY TABLET] 84 tablet      Sig: TAKE 1 TABLET BY MOUTH DAILY       Oral Contraceptives Protocol Passed - 10/16/2019  3:18 PM        Passed - Visit with PCP or prescribing provider visit in last 12 months      Last office visit with prescriber/PCP: 10/30/2018 Anushka Arnold MD OR same dept: 10/30/2018 Anushka Arnold MD OR same specialty: 10/30/2018 Anushka Arnold MD  Last physical: 9/4/2019 Last MTM visit: Visit date not found   Next visit within 3 mo: Visit date not found  Next physical within 3 mo: Visit date not found  Prescriber OR PCP: Anushka Arnold MD  Last diagnosis associated with med order: 1. Uses birth control  - MONO-LINYAH 0.25-35 mg-mcg per tablet [Pharmacy Med Name: MONO-LINYAH 28-DAY TABLET]; TAKE 1 TABLET BY MOUTH DAILY  Dispense: 84 tablet    If protocol passes may refill for 12 months if within 3 months of last provider visit (or a total of 15 months).

## 2021-06-03 VITALS
BODY MASS INDEX: 22.98 KG/M2 | TEMPERATURE: 98.1 F | HEART RATE: 80 BPM | HEIGHT: 69 IN | RESPIRATION RATE: 16 BRPM | DIASTOLIC BLOOD PRESSURE: 62 MMHG | SYSTOLIC BLOOD PRESSURE: 102 MMHG | WEIGHT: 155.19 LBS

## 2021-06-03 NOTE — PROGRESS NOTES
Assessment/Plan:    Britney Singh is a 23 y.o. female presenting for:    1. Encounter for other general counseling and advice on contraception  We discussed the risks and benefits of a myriad of different contraceptive methods today and have decided to try the Mirena IUD.  She states that when she had the Nexplanon she did feel somewhat zhou but she has fairly heavy menstrual cycles and these were somewhat worsened with the copper IUD.    She will come back next Tuesday for Mirena IUD insertion.  I have counseled her to expect that she will have some spotting for a few months after the IUD placement.    2. Anxiety  Refill sent to the pharmacy.  - LORazepam (ATIVAN) 0.5 MG tablet; Take 0.5 tablets (0.25 mg total) by mouth daily as needed for anxiety.  Dispense: 20 tablet; Refill: 0  - ondansetron (ZOFRAN) 4 MG tablet; Take 1 tablet (4 mg total) by mouth daily as needed for nausea.  Dispense: 20 tablet; Refill: 0    3. Nausea  - ondansetron (ZOFRAN) 4 MG tablet; Take 1 tablet (4 mg total) by mouth daily as needed for nausea.  Dispense: 20 tablet; Refill: 0    5. Moderate major depression (H)  Overall doing well.  She has a lot going on her life with her upcoming divorce.        Medications Discontinued During This Encounter   Medication Reason     MY WAY tablet      MONO-LINYAH 0.25-35 mg-mcg per tablet      LORazepam (ATIVAN) 0.5 MG tablet Reorder     ondansetron (ZOFRAN) 4 MG tablet Reorder           Chief Complaint:  Chief Complaint   Patient presents with     Contraception     Discuss getting an IUD       Subjective:   Britney Singh is a very pleasant 23-year-old female presenting to the clinic today for contraception counseling.    The patient is currently in a same-sex marriage but unfortunately her wife has been having an affair.  The patient found out about this about a month ago and they have been working through getting a divorce.  The patient herself does not have any concerns for sexually  "transmitted diseases.    Britney is currently seeing a therapist.  She does have a fairly good support system.  She has a 4-year-old daughter who will be starting to see a therapist as well and they are working through these issues.    The patient would like to discuss getting an IUD.  She had a copper IUD previously which was recently removed because they were thinking about pursuing intrauterine insemination.  She would like to get an IUD back but would like to discuss the different types.  She has fairly heavy menstrual cycles and with a copper IUD they became even more heavy and she did have some uterine tenderness.    12 point review of systems completed and negative except for what has been described above    Social History     Tobacco Use   Smoking Status Former Smoker   Smokeless Tobacco Never Used       Current Outpatient Medications   Medication Sig     albuterol (PROAIR HFA;PROVENTIL HFA;VENTOLIN HFA) 90 mcg/actuation inhaler Inhale 2 puffs every 4 (four) hours as needed for wheezing.     condoms, female (FC2 FEMALE CONDOM MISC)      diaphragms, contoured (CAYA CONTOURED) 65-80 mm Dprh Insert 1 Units into the vagina daily as needed. Prior to intercourse.     hydrOXYzine HCl (ATARAX) 25 MG tablet Take 1 tablet (25 mg total) by mouth 3 (three) times a day as needed for anxiety.     LORazepam (ATIVAN) 0.5 MG tablet Take 0.5 tablets (0.25 mg total) by mouth daily as needed for anxiety.     nonoxynol-9 4 % Gel Use with intercourse     norethindrone (MICRONOR) 0.35 mg tablet      ondansetron (ZOFRAN) 4 MG tablet Take 1 tablet (4 mg total) by mouth daily as needed for nausea.         Objective:  Vitals:    11/12/19 0958   BP: 116/73   Pulse: 84   Resp: 16   Temp: 98.2  F (36.8  C)   TempSrc: Oral   Weight: 146 lb 6 oz (66.4 kg)   Height: 5' 9.29\" (1.76 m)       Body mass index is 21.44 kg/m .    Vital signs reviewed and stable  General: No acute distress  Psych: Appropriate affect  HEENT: moist mucous " membranes  Lymph: no cervical or supraclavicular lymphadenopathy  Cardiovascular: regular rate and rhythm with no murmur  Pulmonary: clear to auscultation bilaterally with no wheeze  Abdomen: soft, non tender, non distended with normo-active bowel sounds  Extremities: warm and well perfused with no edema  Skin: warm and dry with no rash         This note has been dictated and transcribed using voice recognition software.   Any errors in transcription are unintentional and inherent to the software.

## 2021-06-03 NOTE — PROGRESS NOTES
IUD Insertion Procedure Note    Pre-operative Diagnosis: contraception desired    Post-operative Diagnosis: contraception placed    Indications: contraception    Procedure Details   Urine pregnancy test was done today and result was negative.  The risks (including infection, bleeding, pain, and uterine perforation) and benefits of the procedure were explained to the patient and Written informed consent was obtained.      Cervix cleansed with Betadine. Uterus sounded to 8 cm. IUD inserted without difficulty. String visible and trimmed. Patient tolerated procedure well.    IUD Information:  Mirena.    Condition:  Stable    Complications:  None    Plan:    The patient was advised to call for any fever or for prolonged or severe pain or bleeding. She was advised to use NSAID as needed for mild to moderate pain.  Return in one month for a string check.

## 2021-06-04 VITALS
DIASTOLIC BLOOD PRESSURE: 60 MMHG | TEMPERATURE: 97.9 F | WEIGHT: 148 LBS | SYSTOLIC BLOOD PRESSURE: 130 MMHG | HEART RATE: 80 BPM | RESPIRATION RATE: 16 BRPM | BODY MASS INDEX: 21.67 KG/M2

## 2021-06-04 VITALS
SYSTOLIC BLOOD PRESSURE: 116 MMHG | DIASTOLIC BLOOD PRESSURE: 73 MMHG | BODY MASS INDEX: 21.68 KG/M2 | HEIGHT: 69 IN | RESPIRATION RATE: 16 BRPM | HEART RATE: 84 BPM | WEIGHT: 146.38 LBS | TEMPERATURE: 98.2 F

## 2021-06-04 VITALS
HEART RATE: 72 BPM | TEMPERATURE: 98.9 F | SYSTOLIC BLOOD PRESSURE: 122 MMHG | DIASTOLIC BLOOD PRESSURE: 58 MMHG | RESPIRATION RATE: 16 BRPM | HEIGHT: 69 IN | BODY MASS INDEX: 22.58 KG/M2 | WEIGHT: 152.44 LBS

## 2021-06-04 NOTE — PROGRESS NOTES
Assessment/Plan:    Britney Singh is a 24 y.o. female presenting for:    1. Cough  Although certainly her lung affliction could be viral in etiology given her severe shortness of breath and a tendency towards reactive airway disease I think it is reasonable to start azithromycin.  This was sent to the pharmacy.  I did send a refill of albuterol as well.  She will contact me if she has any further questions or concerns.  She will finish her dose of prednisone and let me know if things appreciably worsen thereafter.  - azithromycin (ZITHROMAX Z-BRITTANEY) 250 MG tablet; Take 2 tablets (500 mg) on  Day 1,  followed by 1 tablet (250 mg) once daily on Days 2 through 5.  Dispense: 6 tablet; Refill: 0    2. Shortness of breath  - albuterol (PROAIR HFA;PROVENTIL HFA;VENTOLIN HFA) 90 mcg/actuation inhaler; Inhale 2 puffs every 4 (four) hours as needed for wheezing.  Dispense: 2 Inhaler; Refill: 3  - inhalat.spacing dev,med. mask Spcr; Use 1 Units As Directed once for 1 dose.  Dispense: 1 each; Refill: 0    3. IUD check up  ID appears to be in appropriate position.        Medications Discontinued During This Encounter   Medication Reason     albuterol (PROAIR HFA;PROVENTIL HFA;VENTOLIN HFA) 90 mcg/actuation inhaler Reorder           Chief Complaint:  Chief Complaint   Patient presents with     IUD String Check     Asthma     Having to use her inhaler more       Subjective:   Britney Singh is a very pleasant 24-year-old female presenting to the clinic today for several concerns:    1.  IUD check: Patient had an IUD placed approximately 3 weeks ago.  She would like this today.  She has been able to feel the strings.  She did have some bleeding for the first few weeks but that has overall dissipated.    2.  Shortness of breath: Patient was seen 4 days ago at the walk-in clinic for shortness of breath.  She was diagnosed with bronchitis.  She was started on a prednisone course for 5 days.  She states that the prednisone has  "helped slightly but she continues to feel short of breath.  She is using her inhaler several times daily.  She does feel very fatigued and does have a cough.  She is coughing up some sputum.    12 point review of systems completed and negative except for what has been described above    Social History     Tobacco Use   Smoking Status Former Smoker   Smokeless Tobacco Never Used       Current Outpatient Medications   Medication Sig     albuterol (PROAIR HFA;PROVENTIL HFA;VENTOLIN HFA) 90 mcg/actuation inhaler Inhale 2 puffs every 4 (four) hours as needed for wheezing.     condoms, female (FC2 FEMALE CONDOM MISC)      diaphragms, contoured (CAYA CONTOURED) 65-80 mm Dprh Insert 1 Units into the vagina daily as needed. Prior to intercourse.     hydrOXYzine HCl (ATARAX) 25 MG tablet Take 1 tablet (25 mg total) by mouth 3 (three) times a day as needed for anxiety.     levonorgestrel (MIRENA) 20 mcg/24 hours (5 yrs) 52 mg IUD 1 each by Intrauterine route once.     LORazepam (ATIVAN) 0.5 MG tablet Take 0.5 tablets (0.25 mg total) by mouth daily as needed for anxiety.     nonoxynol-9 4 % Gel Use with intercourse     ondansetron (ZOFRAN) 4 MG tablet Take 1 tablet (4 mg total) by mouth daily as needed for nausea.     azithromycin (ZITHROMAX Z-BRITTANEY) 250 MG tablet Take 2 tablets (500 mg) on  Day 1,  followed by 1 tablet (250 mg) once daily on Days 2 through 5.     predniSONE (DELTASONE) 20 MG tablet Take 20 mg by mouth 2 (two) times a day for 5 days.         Objective:  Vitals:    12/16/19 1648   BP: 122/58   Pulse: 72   Resp: 16   Temp: 98.9  F (37.2  C)   TempSrc: Oral   Weight: 152 lb 7 oz (69.1 kg)   Height: 5' 9.29\" (1.76 m)       Body mass index is 22.32 kg/m .    Vital signs reviewed and stable  General: No acute distress  Psych: Appropriate affect  HEENT: moist mucous membranes, pupils equal, round, reactive to light and accomodation, posterior oropharynx clear of erythema or exudate, tympanic membranes are pearly grey " bilaterally  Lymph: no cervical or supraclavicular lymphadenopathy  Cardiovascular: regular rate and rhythm with no murmur  Pulmonary: Good air movement throughout with mild wheezing throughout all lung fields.  No crackles noted.    Abdomen: soft, non tender, non distended with normo-active bowel sounds  Extremities: warm and well perfused with no edema  Skin: warm and dry with no rash  Genitourinary: Normal external female genitalia, normal-appearing vaginal mucosa and cervix.  IUD strings protruding approximately 3 cm from cervix       This note has been dictated and transcribed using voice recognition software.   Any errors in transcription are unintentional and inherent to the software.

## 2021-06-05 VITALS
HEIGHT: 69 IN | WEIGHT: 163.5 LBS | DIASTOLIC BLOOD PRESSURE: 76 MMHG | BODY MASS INDEX: 24.22 KG/M2 | TEMPERATURE: 98.8 F | HEART RATE: 95 BPM | SYSTOLIC BLOOD PRESSURE: 117 MMHG | RESPIRATION RATE: 16 BRPM

## 2021-06-07 NOTE — TELEPHONE ENCOUNTER
Controlled Substance Refill Request  Medication Name:   Requested Prescriptions     Pending Prescriptions Disp Refills     LORazepam (ATIVAN) 0.5 MG tablet 20 tablet 0     Sig: Take 0.5 tablets (0.25 mg total) by mouth daily as needed for anxiety.     ondansetron (ZOFRAN) 4 MG tablet 20 tablet 0     Sig: Take 1 tablet (4 mg total) by mouth daily as needed for nausea.     Date Last Fill: 11/12/19  Requested [facility]acy: [facility]  Submit electronically to pharmacy  Controlled Substance Agreement on file:   Encounter-Level CSA Scan Date:    There are no encounter-level csa scan date.        Last office visit:  12/16/19         Provider Refill Request  Medication:  zofran  RN can NOT refill this medication per RN refill protocol because med is not covered by policy/route to provider
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
## 2021-06-08 NOTE — PROGRESS NOTES
Assessment/Plan:    Britney Singh is a 21 y.o. female presenting for:    1. Depression   she will continue her Wellbutrin. We will add BuSpar George is done well with this combination in the past. We did discuss that is not a very frequent combination that could potentially be helpful with her anxiety. She will keep me updated over the next few weeks. We did discuss the risks and benefits of both medications.  - busPIRone (BUSPAR) 7.5 MG tablet; Take 1 tablet (7.5 mg total) by mouth 2 (two) times a day.  Dispense: 60 tablet; Refill: 2  - buPROPion (WELLBUTRIN XL) 150 MG 24 hr tablet; Take 1 tablet (150 mg total) by mouth every morning.  Dispense: 30 tablet; Refill: 2    2. Anxiety   See above. You will continue to see her counselor as well.        Medications Discontinued During This Encounter   Medication Reason     OGESTREL, 28, 0.5-50 mg-mcg per tablet Therapy completed     buPROPion (WELLBUTRIN XL) 300 MG 24 hr tablet            Chief Complaint:  Chief Complaint   Patient presents with     Medication Education Visit     Med check     Medication Refill     Refills needed       Subjective:   Britney Singh  Is a 20-year-old female presenting to the clinic today to discuss her depression and anxiety. This is not done well with SSRI medications in the past. She has been on Wellbutrin and we recently increase her dose from 150 mg daily to 300 mg daily. She states with increased dose her mood is better but she's been feeling a much more anxious. She wonders if she can go back to the hundred and 50 mg daily as well as discuss other medications to help with her anxiety. Again she is tried all SSRI medications and really not done well with them due to various different side effects. She states that she maybe has tried BuSpar in the past but does not think that she gave it a long enough chance to work. Again her mood at this point is okay she just feels very anxious. On occasion she does Carole feel as though she could  "leave the house. She is no suicidal or homicidal ideations. She has a good support system and she sees a counselor.    12 point review of systems completed and negative except for what has been described above    History   Smoking Status     Former Smoker   Smokeless Tobacco     Not on file       Current Outpatient Prescriptions   Medication Sig     albuterol (PROVENTIL HFA;VENTOLIN HFA) 90 mcg/actuation inhaler Inhale 2 puffs every 4 (four) hours as needed for wheezing.     buPROPion (WELLBUTRIN XL) 150 MG 24 hr tablet Take 1 tablet (150 mg total) by mouth every morning.     busPIRone (BUSPAR) 7.5 MG tablet Take 1 tablet (7.5 mg total) by mouth 2 (two) times a day.     mirtazapine (REMERON) 15 MG tablet Take 1 tablet (15 mg total) by mouth bedtime.         Objective:  Vitals:    01/24/17 1646   BP: 120/78   Pulse: 72   Resp: 16   Temp: 99.7  F (37.6  C)   TempSrc: Oral   Weight: 155 lb (70.3 kg)   Height: 5' 9\" (1.753 m)       Vital signs reviewed and stable  General: No acute distress  Psych: Appropriate affect  HEENT: moist mucous membranes  Lymph: no cervical or supraclavicular lymphadenopathy  Cardiovascular: regular rate and rhythm with no murmur  Pulmonary: clear to auscultation bilaterally with no wheeze  Abdomen: soft, non tender, non distended with normo-active bowel sounds  Extremities: warm and well perfused with no edema  Skin: warm and dry with no rash         This note has been dictated and transcribed using voice recognition software.   Any errors in transcription are unintentional and inherent to the software.  "
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
## 2021-06-09 ENCOUNTER — COMMUNICATION - HEALTHEAST (OUTPATIENT)
Dept: FAMILY MEDICINE | Facility: CLINIC | Age: 26
End: 2021-06-09

## 2021-06-09 DIAGNOSIS — Z30.41 SURVEILLANCE OF CONTRACEPTIVE PILL: ICD-10-CM

## 2021-06-09 NOTE — TELEPHONE ENCOUNTER
Left message for pt to call back.  Please relay Dr. Sanford's message below to pt and document call was returned.

## 2021-06-09 NOTE — TELEPHONE ENCOUNTER
----- Message from Ida Sanford DO sent at 7/16/2020 11:19 AM CDT -----  Please call Britney.  Dr. Arnold is out of the office. I have reviewed Britney's pelvic US. Her IUD is in the correct place. There is a resolving cyst on the right side, no follow up needed. Left ovary is normal. Overall, her ultrasound findings are normal.  Please let me know if there are any questions or concerns.  Ida Sanford DO

## 2021-06-11 NOTE — PROGRESS NOTES
Assessment/Plan:    Britney Singh is a 24 y.o. female presenting for:    1. Surveillance of contraceptive pill  Patient will begin using a progesterone only pill due to history of difficulties with estrogen for her mood as well as migraines with aura.  She will use a barrier protection as well.  Her current boyfriend has apparently had testicular cancer and she thinks that he may get a semen analysis at some point as they are unsure if he is fertile.  - norethindrone (ORTHO MICRONOR) 0.35 mg tablet; Take 1 tablet (0.35 mg total) by mouth daily.  Dispense: 90 tablet; Refill: 2    2. Encounter for surveillance of other contraceptive  - condoms - female (FC2 FEMALE CONDOM) Misc; Use as needed  Dispense: 30 each; Refill: 1  - HM2(CBC w/o Differential)  - diaphragms, contoured (CAYA CONTOURED) 65-80 mm Dprh; Insert 1 Units into the vagina daily as needed. Prior to intercourse.  Dispense: 1 each; Refill: 0    3. Influenza vaccine needed  - Influenza, Seasonal Quad, PF =/> 6months    4. IUD removal -please see procedure note below    Medications Discontinued During This Encounter   Medication Reason     diaphragms, contoured (CAYA CONTOURED) 65-80 mm Dprh Reorder     condoms, female (FC2 FEMALE CONDOM MISC) Reorder     diaphragms, contoured (CAYA CONTOURED) 65-80 mm Dprh Reorder           Chief Complaint:  Chief Complaint   Patient presents with     Dizziness     Lightheaded and heavy periods       Subjective:   Britney Singh is a pleasant 24-year-old female presenting to the clinic today with concerns over heavy bleeding.  Patient has a copper IUD.  She had this placed in November 2019.  She states that her menstrual cycles have been fairly heavy since that time and most recently been extremely heavy.  She also notes that she has been having some cramping.  She notes that her menstrual cycles are heavy for about 6 days and fill up her menstrual cup.  She notes that she empties her 50 mL menstrual cup 5-6 times  "daily.    Yesterday she was feeling lightheaded.  She is hopeful to get the IUD removed and to discuss other forms of contraception.  She has migraines with aura and has not done well in the past with any sort of estrogen contraception per her report given mood irregularities and concern regarding the migraines.    12 point review of systems completed and negative except for what has been described above    Social History     Tobacco Use   Smoking Status Former Smoker   Smokeless Tobacco Never Used       Current Outpatient Medications   Medication Sig     albuterol (PROAIR HFA;PROVENTIL HFA;VENTOLIN HFA) 90 mcg/actuation inhaler Inhale 2 puffs every 4 (four) hours as needed for wheezing.     condoms - female (FC2 FEMALE CONDOM) Misc Use as needed     copper (PARAGARD) 380 square mm IUD IUD 1 each by Intrauterine route once.     diaphragms, contoured (CAYA CONTOURED) 65-80 mm Dprh Insert 1 Units into the vagina daily as needed. Prior to intercourse.     hydrOXYzine HCl (ATARAX) 25 MG tablet Take 1 tablet (25 mg total) by mouth 3 (three) times a day as needed for anxiety.     LORazepam (ATIVAN) 0.5 MG tablet Take 0.5 tablets (0.25 mg total) by mouth daily as needed for anxiety.     nonoxynol-9 4 % Gel Use with intercourse     ondansetron (ZOFRAN) 4 MG tablet Take 1 tablet (4 mg total) by mouth daily as needed for nausea.     norethindrone (ORTHO MICRONOR) 0.35 mg tablet Take 1 tablet (0.35 mg total) by mouth daily.         Objective:  Vitals:    09/11/20 1541   BP: 117/76   Pulse: 95   Resp: 16   Temp: 98.8  F (37.1  C)   TempSrc: Oral   Weight: 163 lb 8 oz (74.2 kg)   Height: 5' 9.29\" (1.76 m)       Body mass index is 23.94 kg/m .    Vital signs reviewed and stable  General: No acute distress  Psych: Appropriate affect  HEENT: moist mucous membranes  Lymph: no cervical or supraclavicular lymphadenopathy  Cardiovascular: regular rate and rhythm with no murmur  Pulmonary: clear to auscultation bilaterally with no " wheeze  Abdomen: soft, non tender, non distended with normo-active bowel sounds  Genitourinary: Normal external female genitalia, IUD easily seen and removed.  Please see procedure note below  Extremities: warm and well perfused with no edema  Skin: warm and dry with no rash    IUD removal procedure note    Pre-operative Diagnosis: Menorrhagia    Post-operative Diagnosis: IUD removed    Indications: Menorrhagia    Procedure Details   The risks (including infection, bleeding, pain) and benefits of the procedure were explained to the patient and verbal informed consent was obtained.    The patient was laid in the supine position.  A sterile speculum was inserted into the vaginal canal.  The IUD strings were easily visualized, grasped with a ring forceps, and the IUD was gently removed in its entirety.    Condition:  Stable    Complications:  None    Plan:    The patient was advised to call for any fever or for prolonged or severe pain or bleeding. She was advised to use NSAID as needed for mild to moderate pain.        This note has been dictated and transcribed using voice recognition software.   Any errors in transcription are unintentional and inherent to the software.

## 2021-06-11 NOTE — PROGRESS NOTES
Assessment/Plan:    Britney Singh is a 21 y.o. female presenting for:    1. Shoulder pain  Likely rotator cuff tendinitis.  We did discuss some exercises today.  I will have first come back and see the physical therapist at some point in the next few weeks.  - Ambulatory referral to PT/OT    2. Hip pain  Residual from pregnancy.  Can follow-up with physical therapy.  I did discuss that they may not be able to address both shoulder and hip pain at the same visit and she is understanding of that.  - Ambulatory referral to PT/OT    3. Ovarian cyst  Due to her history of ovarian cysts she would like to try birth control once again.  She states that she has nausea with oral contraceptives but would like to try the patch.  The patient actually has a copper IUD in place for birth control but this would be more to help prevent ovarian cysts.  - norelgestromin-ethinyl estradiol (ORTHO EVRA) 150-35 mcg/24 hr; Use for one week and replace - every three weeks have a patch free week  Dispense: 3 patch; Refill: 12    4.  Depression/anxiety  She wishes to discontinue her Wellbutrin today which will be taken off of her medication list.  She will let me know at some point in the next 3 or 4 weeks if she would like to increase her Cymbalta.    5.  Vaginal bleeding  I did discuss that I think it is fine for her to use a menstrual cup while having the IUD in place.  We discussed the risks of excellently pulling the IUD and she will be very careful when removing the cup.    Medications Discontinued During This Encounter   Medication Reason     buPROPion (WELLBUTRIN XL) 150 MG 24 hr tablet            Chief Complaint:  Chief Complaint   Patient presents with     Medication Education Visit     Med check     Shoulder Pain     Bilateral- denies injury       Subjective:   Britney Singh is a pleasant 21-year-old female presenting to the clinic today for several concerns:    1.  Shoulder pain: Patient has noted bilateral shoulder pain for  "the last few months.  She has a almost 2-year-old at home notes that she does a lot of lifting with her.  She states that her pain is exacerbated with lifting.  It does hurt when she lays on her shoulders at night as well.  She feels as though her range of motion is beginning to decrease due to the pain.    2.  Abdominal pain: Patient has a history of several ovarian cysts.  She recently had what she assumes was a right-sided ovarian cyst is it was mildly painful and then got more acutely painful later on.  She was able to take some pain medication and be fine but she is wondering if she could get on hormonal therapy.  Patient currently has a copper IUD but had been recommended to try some high-dose birth control pills.  She does not want to do the pills because they make her nauseated but is wondering if she could try the birth control patches.    #3.  Depression/anxiety: Patient is a past medical history of depression and anxiety.  She is currently on Cymbalta as she has a history of fibromyalgia as well.  She also takes Wellbutrin.  She thinks the Wellbutrin might be exacerbating her anxiety and is wondering if she could stop taking that for a while to see how she does.    12 point review of systems completed and negative except for what has been described above    History   Smoking Status     Former Smoker   Smokeless Tobacco     Not on file       Current Outpatient Prescriptions   Medication Sig     albuterol (PROVENTIL HFA;VENTOLIN HFA) 90 mcg/actuation inhaler Inhale 2 puffs every 4 (four) hours as needed for wheezing.     DULoxetine (CYMBALTA) 20 MG capsule TAKE ONE CAPSULE BY MOUTH D AILY     norelgestromin-ethinyl estradiol (ORTHO EVRA) 150-35 mcg/24 hr Use for one week and replace - every three weeks have a patch free week         Objective:  Vitals:    06/28/17 1327   BP: 122/60   Pulse: 76   Resp: 16   Temp: 98.3  F (36.8  C)   TempSrc: Oral   Weight: 160 lb (72.6 kg)   Height: 5' 9\" (1.753 m) "       Vital signs reviewed and stable  General: No acute distress  Psych: Appropriate affect  HEENT: moist mucous membranes, pupils equal, round, reactive to light and accomodation, posterior oropharynx clear of erythema or exudate, tympanic membranes are pearly grey bilaterally  Lymph: no cervical or supraclavicular lymphadenopathy  Cardiovascular: regular rate and rhythm with no murmur  Pulmonary: clear to auscultation bilaterally with no wheeze  Abdomen: soft, non tender, non distended with normo-active bowel sounds  Extremities: warm and well perfused with no edema  Skin: warm and dry with no rash         This note has been dictated and transcribed using voice recognition software.   Any errors in transcription are unintentional and inherent to the software.

## 2021-06-12 NOTE — PROGRESS NOTES
"Britney Singh is a 24 y.o. female who is being evaluated via a billable video visit.      The patient has been notified of following:     \"This video visit will be conducted via a call between you and your physician/provider. We have found that certain health care needs can be provided without the need for an in-person physical exam.  This service lets us provide the care you need with a video conversation.  If a prescription is necessary we can send it directly to your pharmacy.  If lab work is needed we can place an order for that and you can then stop by our lab to have the test done at a later time.    Video visits are billed at different rates depending on your insurance coverage. Please reach out to your insurance provider with any questions.    If during the course of the call the physician/provider feels a video visit is not appropriate, you will not be charged for this service.\"    Patient has given verbal consent to a Video visit? Yes  How would you like to obtain your AVS? AVS Preference: Vendahart.  If dropped by the video visit, the video invitation should be sent to: Cedric  Will anyone else be joining your video visit? No        Video Start Time: 4pm    -------------------------    Assessment/Plan:    Britney Singh is a 24 y.o. female presenting for:    1. Moderate major depression (H)  Moderate depression at this point is sometimes edging on severe.  She is resistant to seeing psychiatry given that she has not done well seeing them in the past.  We will increase her BuSpar to help with her anxiety.  Discussed that this will likely not help so much with her depression.  She will be seeing 2 therapists -normal 1 as well as one that specializes in PTSD.  She does not have any specific suicidal plan but does endorse thoughts of suicide.  She does have emergency numbers and access to her psychologist which she would utilize if needed.    2. Anxiety  We will send Xanax to the pharmacy which she can " take as needed.  Discussed that this is not necessarily a sleep aid but it may help her in the short-term.    Discussed that I am happy to fill out forms for work for a leave of absence due to her anxiety.  - busPIRone (BUSPAR) 10 MG tablet; Take 1 tablet (10 mg total) by mouth 2 (two) times a day.  Dispense: 60 tablet; Refill: 3  - ondansetron (ZOFRAN) 4 MG tablet; Take 1 tablet (4 mg total) by mouth daily as needed for nausea.  Dispense: 20 tablet; Refill: 0  - ALPRAZolam (XANAX) 0.25 MG tablet; Take 1 tablet (0.25 mg total) by mouth at bedtime as needed for anxiety.  Dispense: 30 tablet; Refill: 0    3. Nightmares associated with chronic post-traumatic stress disorder  Low-dose prazosin sent to the pharmacy.  Discussed how to use the medications as well as side effects.  - prazosin (MINIPRESS) 1 MG capsule; Take 1-2 capsules (1-2 mg total) by mouth at bedtime.  Dispense: 30 capsule; Refill: 0    4. Nausea  - ondansetron (ZOFRAN) 4 MG tablet; Take 1 tablet (4 mg total) by mouth daily as needed for nausea.  Dispense: 20 tablet; Refill: 0    5. High risk medication use  - ondansetron (ZOFRAN) 4 MG tablet; Take 1 tablet (4 mg total) by mouth daily as needed for nausea.  Dispense: 20 tablet; Refill: 0        Medications Discontinued During This Encounter   Medication Reason     copper (PARAGARD) 380 square mm IUD IUD Therapy completed     ondansetron (ZOFRAN) 4 MG tablet Reorder           Chief Complaint:  Chief Complaint   Patient presents with     Anxiety     Daily panic attacks       Subjective:   Britney Singh is a pleasant 24 y.o. female being evaluated via video visit today for the following concern/s:     Worsening depression and anxiety: Patient is a past medical history significant for depression and anxiety.  She has seen several psychiatrists in the past who put her on different medications for which she never tolerates well.  She has not tolerated SSRIs, SNRIs or tricyclics as well as antipsychotics  "which she has tried in the past.  She currently is on BuSpar 5 mg 2-3 times daily which she thinks is helping somewhat with her anxiety.    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  She is unsure what she can do about that.  She sometimes has very mild panic attacks at school but will often have panic attacks at home.    She declines any suicidal plan but does endorse some suicidal ideations.  She has a young daughter and states that she previously has thought that she would \"never hurt myself because of her but now I feel as though I am hurting her more sometimes just being here.\"  These are new thoughts for her.  She still denies any suicidal plan and does not feel as though she would follow through on these thoughts.  She does have emergency numbers that she could call and absolutely refuses at this point to go to the hospital or an inpatient treatment program.    She will be starting to work with a new counselor specifically on her PTSD.  She has been having PTSD nightmares and not sleeping well which she thinks is causing some issues as well.      12 point review of systems completed and negative except for what has been described above    Social History     Tobacco Use   Smoking Status Former Smoker   Smokeless Tobacco Never Used       Current Outpatient Medications   Medication Sig     albuterol (PROAIR HFA;PROVENTIL HFA;VENTOLIN HFA) 90 mcg/actuation inhaler Inhale 2 puffs every 4 (four) hours as needed for wheezing.     condoms - female (FC2 FEMALE CONDOM) Misc Use as needed     diaphragms, contoured (CAYA CONTOURED) 65-80 mm Dprh Insert 1 Units into the vagina daily as needed. Prior to intercourse.     hydrOXYzine HCl (ATARAX) 25 MG tablet Take 1 tablet (25 mg total) by mouth 3 (three) times a day as needed for anxiety.     LORazepam (ATIVAN) 0.5 MG tablet Take 0.5 tablets (0.25 mg total) by mouth daily as " needed for anxiety.     nonoxynol-9 4 % Gel Use with intercourse     norethindrone (ORTHO MICRONOR) 0.35 mg tablet Take 1 tablet (0.35 mg total) by mouth daily.     ondansetron (ZOFRAN) 4 MG tablet Take 1 tablet (4 mg total) by mouth daily as needed for nausea.     ALPRAZolam (XANAX) 0.25 MG tablet Take 1 tablet (0.25 mg total) by mouth at bedtime as needed for anxiety.     busPIRone (BUSPAR) 10 MG tablet Take 1 tablet (10 mg total) by mouth 2 (two) times a day.     prazosin (MINIPRESS) 1 MG capsule Take 1-2 capsules (1-2 mg total) by mouth at bedtime.         Objective:  No flowsheet data found.        There is no height or weight on file to calculate BMI.    General: No acute distress  Psych: Appropriate affect, tearful at times  HEENT: moist mucous membranes  Pulmonary: Breathing comfortably, speaking in complete sentences  Extremities: warm and well perfused with no edema  Skin: warm and dry with no rash         This note has been dictated and transcribed using voice recognition software.   Any errors in transcription are unintentional and inherent to the software.    Video-Visit Details    Type of service:  Video Visit    Video End Time (time video stopped): 433pm  Originating Location (pt. Location): Home    Distant Location (provider location):  [facility]     Platform used for Video Visit: Geraldine Arnold MD

## 2021-06-13 NOTE — PROGRESS NOTES
Assessment/Plan:    Britney Singh is a 21 y.o. female presenting for:    1. Screen for STD (sexually transmitted disease)  - HIV Antigen/Antibody Screening [facility]  - Syphilis Screen, [facility]  - Chlamydia trachomatis & Neisseria gonorrhoeae, Amplified Detection  - Hepatitis B Surface Antigen (HBsAG)    2. Anxiety  We will increase her Cymbalta to 60 mg daily.  Lower dose hydroxyzine tablets sent to the pharmacy.  - hydrOXYzine (ATARAX) 25 MG tablet; Take 1 tablet (25 mg total) by mouth 3 (three) times a day as needed for itching.  Dispense: 90 tablet; Refill: 1  - DULoxetine (CYMBALTA) 60 MG capsule; Take 1 capsule (60 mg total) by mouth daily.  Dispense: 30 capsule; Refill: 1    3. Healthcare maintenance  - Influenza, Seasonal,Quad Inj, 36+ MOS    4. Shoulder pain, bilateral  Physical therapy is actually helping and I discussed with the patient that she could certainly continue that.  She would like to see an orthopedic physician to discuss injections.  I would not feel comfortable doing those on her at this point but potentially this would be the recommendation.  Otherwise I did discuss taking naproxen on a scheduled basis for the next 5-7 days to see if this helps.  - Ambulatory referral to Orthopedics        Medications Discontinued During This Encounter   Medication Reason     norelgestromin-ethinyl estradiol (ORTHO EVRA) 150-35 mcg/24 hr Therapy completed     DULoxetine (CYMBALTA) 20 MG capsule            Chief Complaint:  Chief Complaint   Patient presents with     Medication Education Visit     Med Check     Follow-up     Bilateral Shoulder Pain       Subjective:   Britney Singh is a pleasant 21-year-old female presenting to the clinic today for several concerns:    1: Anxiety: Patient is a past medical history significant for depression and anxiety.  She currently takes Cymbalta 40 mg daily.  She was on Wellbutrin for some time however that did not seem to work well for her.  She is doing well with  "the Cymbalta and is hopeful to get a refill but wondering if we can increase the dose a bit.  She also takes hydroxyzine but only has 50 mg capsules and wonders if there is a smaller dose of that that she could take.  She does have occasional anxiety attacks but they are actually a bit improved from previous.    2.  Shoulder pain: Patient has been having bilateral shoulder pain shoulder pain.  I spoke with her about this at her last appointment and she discussed this with her physical therapist.  Her physical therapist gave her some exercises to do and her shoulders are much better.  She notes that they are still painful but the range of motion is better.  She is wondering if there is anything else that she could be doing.  She request to speak with a specialist regarding her shoulders.  She states she is happy to continue doing physical therapy as well but is wondering if she could get into an orthopedic specialist to discuss further.    12 point review of systems completed and negative except for what has been described above    History   Smoking Status     Former Smoker   Smokeless Tobacco     Not on file       Current Outpatient Prescriptions   Medication Sig     albuterol (PROVENTIL HFA;VENTOLIN HFA) 90 mcg/actuation inhaler Inhale 2 puffs every 4 (four) hours as needed for wheezing.     norgestimate-ethinyl estradiol (TRI-LO-SPRINTEC) 0.18/0.215/0.25 mg-25 mcg Tab tablet Take 1 tablet by mouth daily.     DULoxetine (CYMBALTA) 60 MG capsule Take 1 capsule (60 mg total) by mouth daily.     hydrOXYzine (ATARAX) 25 MG tablet Take 1 tablet (25 mg total) by mouth 3 (three) times a day as needed for itching.         Objective:  Vitals:    10/04/17 1107   BP: 100/68   Pulse: 76   Resp: 16   Temp: 98.3  F (36.8  C)   TempSrc: Oral   Weight: 162 lb 6 oz (73.7 kg)   Height: 5' 9\" (1.753 m)       Vital signs reviewed and stable  General: No acute distress  Psych: Appropriate affect  HEENT: moist mucous " membranes  Cardiovascular: regular rate and rhythm with no murmur  Pulmonary: clear to auscultation bilaterally with no wheeze  Abdomen: soft, non tender, non distended with normo-active bowel sounds  Extremities: warm and well perfused with no edema  Skin: warm and dry with no rash  Focus muscular skeletal examination: Slightly decreased abduction in bilateral shoulders.       This note has been dictated and transcribed using voice recognition software.   Any errors in transcription are unintentional and inherent to the software.

## 2021-06-15 NOTE — PROGRESS NOTES
Assessment/Plan:    Britney Singh is a 22 y.o. female presenting for:    1. Sinusitis  Given that the patient has had about 2 weeks of sinus symptoms I think it is reasonable to treat with antibiotics at this time.  Doxycycline sent to the pharmacy.  She will use 1 tablet by mouth twice daily.  I discussed that this antibiotic generally will make people nauseated and recommended that she take with food.  She will also take a probiotic.  - doxycycline (VIBRA-TABS) 100 MG tablet; Take 1 tablet (100 mg total) by mouth 2 (two) times a day for 10 days.  Dispense: 20 tablet; Refill: 0        There are no discontinued medications.        Chief Complaint:  Chief Complaint   Patient presents with     Sinusitis       Subjective:   Britney Singh is a pleasant 22-year-old female presenting to the clinic today for concerns over a potential sinus infection.  The patient states that about a week and 1/2-2 weeks ago she began to get sinus pressure.  She notes that it has just been getting worse over the last several days and now has pain in her upper jaw as well as a fairly constant headache.  She is unsure if she has had fevers but she does have a low-grade temperature here.  Her daughter was unfortunately just diagnosed with influenza B here in clinic.  The patient herself has not really been coughing.  She has been eating and drinking fairly well.  Sleeping fairly well although she does note that she has been waking up and sweats for the last few nights.    12 point review of systems completed and negative except for what has been described above    History   Smoking Status     Former Smoker   Smokeless Tobacco     Never Used       Current Outpatient Prescriptions   Medication Sig     albuterol (PROVENTIL HFA;VENTOLIN HFA) 90 mcg/actuation inhaler Inhale 2 puffs every 4 (four) hours as needed for wheezing.     DULoxetine (CYMBALTA) 20 MG capsule Take 2 capsules (40 mg total) by mouth daily.     hydrOXYzine (ATARAX) 25 MG  "tablet Take 1 tablet (25 mg total) by mouth 3 (three) times a day as needed for itching.     TRI-LO-DAMIAN 0.18/0.215/0.25 mg-25 mcg Tab tablet TAKE 1 TABLET BY MOUTH DAILY     doxycycline (VIBRA-TABS) 100 MG tablet Take 1 tablet (100 mg total) by mouth 2 (two) times a day for 10 days.         Objective:  Vitals:    01/17/18 1214   BP: 110/68   Pulse: 88   Resp: 16   Temp: 99.8  F (37.7  C)   TempSrc: Oral   Height: 5' 9\" (1.753 m)       Vital signs reviewed and stable  General: No acute distress  Psych: Appropriate affect  HEENT: moist mucous membranes, pupils equal, round, reactive to light and accomodation, posterior oropharynx clear of erythema or exudate, tympanic membranes are pearly grey bilaterally  Lymph: no cervical or supraclavicular lymphadenopathy  Cardiovascular: regular rate and rhythm with no murmur  Pulmonary: clear to auscultation bilaterally with no wheeze  Abdomen: soft, non tender, non distended with normo-active bowel sounds  Extremities: warm and well perfused with no edema  Skin: warm and dry with no rash         This note has been dictated and transcribed using voice recognition software.   Any errors in transcription are unintentional and inherent to the software.  "

## 2021-06-16 NOTE — PROGRESS NOTES
Assessment/Plan:     Health maintenance female exam.  All questions answered.  PAP done today  Breast self exam technique reviewed and patient encouraged to perform self-exam monthly.  Discussed healthy lifestyle modifications.    1. Depression  After discussion with the patient we have decided to wean her off of Cymbalta and start Wellbutrin once again.  She is considering whether she would like to see a psychiatrist.  We also discussed gently testing today and she will discuss this with her insurance company.  - buPROPion (WELLBUTRIN XL) 150 MG 24 hr tablet; Take 1 tablet (150 mg total) by mouth every morning.  Dispense: 30 tablet; Refill: 2  - DULoxetine (CYMBALTA) 20 MG capsule; Take 2 capsules (40 mg total) by mouth daily.  Dispense: 30 capsule; Refill: 1  - Ambulatory referral to Psychiatry    2. Healthcare maintenance  - Gynecologic Cytology (PAP Smear)    3.  Oral hormonal pill  Patient has an intrauterine device for pregnancy prevention but she is on oral contraceptive pills to help prevent ovarian cysts.  She recently had a cyst.  She is wondering if she can switch her medication.  She was on Ortho Tri-Cyclen and doing well with that although had some breakthrough bleeding.  She wonders if she can try the monophasic option of that.      Subjective:      Britney Singh is a 22 y.o. female who presents for an annual exam.  She is overall doing well.  She lives at home with her wife and daughter.  She stays at home.  She notes that her depression and anxiety has been worsening.  She is currently on Cymbalta which was started due to her history of fibromyalgia and we will hopeful we can get some pain control as well as control of the depression/anxiety.  She states that the Cymbalta does not seem to working that well and is giving her side effects of facial tingling and numbness on occasion.  We have discussed seeing a psychiatrist in the past and she thinks this is something that she might be interested  in doing and is looking into it.  She thinks that she would like to be on a different medication at this point.  She has not really tolerated SSRI medications very well in the past.      Healthy Habits:   Regular Exercise: Yes  Sunscreen Use: Yes  Healthy Diet: Yes  Dental Visits Regularly: Yes  Seat Belt: Yes  Sexually active: Yes  Self Breast Exam Monthly:Yes  Colonoscopy: N/A  Prevention of Osteoporosis: N/A  Last Dexa: N/A    Immunization History   Administered Date(s) Administered     DTaP, historic 02/15/1994, 04/15/1994, 1994, 06/15/1995, 1999     HPV Quadrivalent 2007, 2008, 2008     Hep A, historic 2007, 2010     Hep B, historic 02/15/1994, 1994, 10/13/1994, 2011     IPV 02/15/1994, 04/15/1994, 1994, 06/15/1995     Influenza, inj, historic,unspecified 1993, 2005, 2005, 2006, 2007, 2009, 2010, 10/14/2013, 2016     Influenza, seasonal,quad inj 36+ mos 2015, 10/04/2017     MMR 1995, 1999     Meningococcal MCV4P 2005     Pneumo Polysac 23-V 10/14/2013     Td,adult,historic,unspecified 2005     Tdap 1900, 2015     Immunization status: up to date and documented.    Gynecologic History  No LMP recorded.  Contraception: IUD  Last Pap: . Results were: normal      OB History    Para Term  AB Living   2 1 1  1 1   SAB TAB Ectopic Multiple Live Births   1    1      # Outcome Date GA Lbr Fredy/2nd Weight Sex Delivery Anes PTL Lv   2 Term 08/13/15 41w3d 02:50 / 02:16 8 lb 14 oz (4.026 kg) F Vag-Spont Spinal N AFSANEH   1 SAB 14                  Current Outpatient Prescriptions   Medication Sig Dispense Refill     albuterol (PROVENTIL HFA;VENTOLIN HFA) 90 mcg/actuation inhaler Inhale 2 puffs every 4 (four) hours as needed for wheezing. 1 Inhaler 0     DULoxetine (CYMBALTA) 20 MG capsule Take 2 capsules (40 mg total) by mouth daily. 30 capsule 1      hydrOXYzine (ATARAX) 25 MG tablet Take 1 tablet (25 mg total) by mouth 3 (three) times a day as needed for itching. 90 tablet 1     buPROPion (WELLBUTRIN XL) 150 MG 24 hr tablet Take 1 tablet (150 mg total) by mouth every morning. 30 tablet 2     norgestimate-ethinyl estradiol (ORTHO-CYCLEN) 0.25-35 mg-mcg per tablet Take 1 tablet by mouth daily. 3 Package 1     No current facility-administered medications for this visit.      Past Medical History:   Diagnosis Date     Asthma      Borderline personality disorder      Depression      Eating disorder      PTSD (post-traumatic stress disorder)      Past Surgical History:   Procedure Laterality Date     WISDOM TOOTH EXTRACTION  2011     Amoxicillin  Family History   Problem Relation Age of Onset     Breast cancer Paternal Grandmother      dx age 70     Diabetes type II Father      Hypertension Father      Hemochromatosis Father      Heart disease Maternal Grandmother      Mental illness Mother      Thyroid disease Mother      Skin cancer Maternal Grandfather      Social History     Social History     Marital status:      Spouse name: N/A     Number of children: N/A     Years of education: N/A     Occupational History     Not on file.     Social History Main Topics     Smoking status: Former Smoker     Smokeless tobacco: Never Used     Alcohol use No     Drug use: No     Sexual activity: Yes     Partners: Female     Other Topics Concern     Not on file     Social History Narrative       Review of Systems  General:  Denies problems  Eyes:  Denies problems  Ears/Nose/Throat:  Denies problems  Cardiovascular:  Denies problems  Respiratory:  Denies problems  Gastrointestinal:  Denies problems  Genitourinary:  Denies problems  Musculoskeletal:  Denies problems  Skin:  Denies problems, Neurologic:  Denies problems  Psychiatric:  Denies problems  Endocrine:  Denies problems  Heme/Lymphatic:  Denies problems  Allergic/Immunologic:  Denies problems       Objective:        "  Vitals:    03/07/18 1125   BP: 108/64   Pulse: 90   Resp: 14   Temp: 98.8  F (37.1  C)   TempSrc: Oral   Weight: 164 lb 9.6 oz (74.7 kg)   Height: 5' 9.25\" (1.759 m)       Physical Exam:  General Appearance: Alert, cooperative, no distress, appears stated age   Head: Normocephalic, without obvious abnormality, atraumatic  Eyes: PERRL, conjunctiva/corneas clear, EOM's intact   Ears: Normal TM's and external ear canals, both ears  Nose:Nares normal, septum midline,mucosa normal, no drainage    Throat:Lips, mucosa, and tongue normal; teeth and gums normal  Neck: Supple, symmetrical, trachea midline, no adenopathy;  thyroid: not enlarged, symmetric, no tenderness/mass/nodules  Back: Symmetric, no curvature, ROM normal,  Lungs: Clear to auscultation bilaterally, respirations unlabored  Breasts: No breast masses, tenderness, asymmetry, or nipple discharge.  Heart: Regular rate and rhythm, S1 and S2 normal, no murmur, rub, or gallop  Abdomen: Soft, non-tender, bowel sounds active all four quadrants,  no masses, no organomegaly  Pelvic:normal external female genitalia, normal appearing vaginal mucosa and cervix  Extremities: Extremities normal, atraumatic, no cyanosis or edema  Skin: Skin color, texture, turgor normal, no rashes or lesions  Lymph nodes: Cervical, supraclavicular, and axillary nodes normal and   Neurologic: Normal       "

## 2021-06-17 NOTE — PROGRESS NOTES
"Assessment/Plan:    Britney Singh is a 22 y.o. female presenting for:    1. Anxiety  After extensive review of the patient's medical chart as well as her GEN site testing we have decided on trialing Paxil.  Risks and benefits of this medication discussed.  Ativan also sent to the pharmacy to be used just as needed.  She feels as though she may only use this once a week but is appreciative to have it.  This was also in a \"use as directed\" category on her GEN site testing.    The patient did lacy that she considers harming herself daily but states that she would never take action on this.  She does have crisis numbers at home.  - PARoxetine (PAXIL) 20 MG tablet; Take 1/2 tab daily for 10 days - if tolerating increase to a full tablet daily  Dispense: 30 tablet; Refill: 2  - LORazepam (ATIVAN) 0.5 MG tablet; Take 1 tablet (0.5 mg total) by mouth daily as needed for anxiety.  Dispense: 10 tablet; Refill: 0        Medications Discontinued During This Encounter   Medication Reason     buPROPion (WELLBUTRIN XL) 150 MG 24 hr tablet      DULoxetine (CYMBALTA) 20 MG capsule        I personally spent 25 minutes with this patient over half of which spent in face-to-face counseling and coordination of care    Chief Complaint:  Chief Complaint   Patient presents with     Follow-up     Results     Gene Sight Testing       Subjective:   Britney Singh is a pleasant 22-year-old female presenting to the clinic today for discussion of her anxiety.  I seen the patient several times for depression and anxiety.  She has been on a myriad of medications including Prozac, Zoloft, Wellbutrin, effexor and Cymbalta.  She recently weaned herself off of her Cymbalta.  She feels as though she does not do well with the medications.  GEN site testing was recently done and the results will be scanned into the chart.    She would like to discuss the results today and potentially start on another medication.  She feels very anxious most of the " "time.  She generally will take some Vistaril which helps but wonders if she can get something that is slightly stronger to be used once or twice a month.        12 point review of systems completed and negative except for what has been described above    History   Smoking Status     Former Smoker   Smokeless Tobacco     Never Used       Current Outpatient Prescriptions   Medication Sig     albuterol (PROVENTIL HFA;VENTOLIN HFA) 90 mcg/actuation inhaler Inhale 2 puffs every 4 (four) hours as needed for wheezing.     hydrOXYzine (ATARAX) 25 MG tablet Take 1 tablet (25 mg total) by mouth 3 (three) times a day as needed for itching.     norgestimate-ethinyl estradiol (ORTHO-CYCLEN) 0.25-35 mg-mcg per tablet Take 1 tablet by mouth daily.     LORazepam (ATIVAN) 0.5 MG tablet Take 1 tablet (0.5 mg total) by mouth daily as needed for anxiety.     PARoxetine (PAXIL) 20 MG tablet Take 1/2 tab daily for 10 days - if tolerating increase to a full tablet daily         Objective:  Vitals:    05/09/18 1327   BP: 110/68   Pulse: 88   Resp: 16   Temp: 99.1  F (37.3  C)   TempSrc: Oral   Weight: 171 lb 6 oz (77.7 kg)   Height: 5' 9.25\" (1.759 m)       Vital signs reviewed and stable  General: No acute distress  Psych: Appropriate affect, tearful  HEENT: moist mucous membranes  Skin: warm and dry with no rash         This note has been dictated and transcribed using voice recognition software.   Any errors in transcription are unintentional and inherent to the software.  "

## 2021-06-19 NOTE — PROGRESS NOTES
Assessment/Plan:    Britney Singh is a 22 y.o. female presenting for:    Nausea: This is improving.  Zofran sent to the pharmacy to be used as needed.  Otherwise she will follow-up if there are further concerns.  I did discuss that it will likely take several weeks to feel 100%.    Anxiety/ Depression - moderate major  Her psychiatrist is started her on terazosin and frontalis.  The labs below are ordered by psychiatry.  Refill of her hydroxyzine was sent to the pharmacy.  - prazosin (MINIPRESS) 1 MG capsule;   - vortioxetine (TRINTELLIX) 10 mg Tab tablet;   - hydrOXYzine HCl (ATARAX) 25 MG tablet; Take 1 tablet (25 mg total) by mouth 3 (three) times a day as needed for anxiety.  Dispense: 30 tablet; Refill: 2  - ondansetron (ZOFRAN) 4 MG tablet; Take 1 tablet (4 mg total) by mouth daily as needed for nausea.  Dispense: 20 tablet; Refill: 0  - Alcohol, Ethyl, Urine Screen  - Drugs of Abuse 1,Urine  - T4, Free  - Thyroid Stimulating Hormone (TSH)  - HM1 (CBC and Differential)  - Comprehensive Metabolic Panel  - FOPIA opiotes, serum, plasma, quant = cpt 75581, 08445  order # [id number] - [facility] [place]. Lab Test  - [facility], CPT 30047,  - Misc. Lab Test  - HM1 (CBC with Diff)          Medications Discontinued During This Encounter   Medication Reason     PARoxetine (PAXIL) 20 MG tablet      hydrOXYzine (ATARAX) 25 MG tablet Reorder           Chief Complaint:  Chief Complaint   Patient presents with     Hospital Visit Follow Up     [facility] F/U 6/26/18       Subjective:   Britney Singh is a 22-year-old female into the clinic today after a emergency room visit.  The patient was camping when she began to have some nausea and vomiting.  She was seen in the emergency room and [facility] and was found to have a gastroenteritis via CT scan.  She was given Zofran.  She is slowly feeling better.  She is advancing her diet.  No further vomiting.  No fevers.      She has a history of depression and  "anxiety as well.  Her psychiatrist recently placed her on some medications and she needs the labs that have been ordered today.        12 point review of systems completed and negative except for what has been described above    History   Smoking Status     Former Smoker   Smokeless Tobacco     Never Used       Current Outpatient Prescriptions   Medication Sig Note     albuterol (PROAIR HFA;PROVENTIL HFA;VENTOLIN HFA) 90 mcg/actuation inhaler Inhale 2 puffs every 4 (four) hours as needed for wheezing.      hydrOXYzine HCl (ATARAX) 25 MG tablet Take 1 tablet (25 mg total) by mouth 3 (three) times a day as needed for anxiety.      LORazepam (ATIVAN) 0.5 MG tablet Take 1 tablet (0.5 mg total) by mouth daily as needed for anxiety.      mirtazapine (REMERON) 15 MG tablet Take 0.5 tablets (7.5 mg total) by mouth at bedtime.      norgestimate-ethinyl estradiol (ORTHO-CYCLEN) 0.25-35 mg-mcg per tablet Take 1 tablet by mouth daily.      prazosin (MINIPRESS) 1 MG capsule  7/3/2018: Prescribed by Trino Li  Received from: Patient     vortioxetine (TRINTELLIX) 10 mg Tab tablet  7/3/2018: Prescribed by Trino Li Received from: Patient     ondansetron (ZOFRAN) 4 MG tablet Take 1 tablet (4 mg total) by mouth daily as needed for nausea.          Objective:  Vitals:    07/03/18 1314   BP: 100/62   Pulse: 68   Resp: 16   Temp: 98.8  F (37.1  C)   TempSrc: Oral   Weight: 168 lb 9 oz (76.5 kg)   Height: 5' 9.25\" (1.759 m)       Vital signs reviewed and stable  General: No acute distress  Psych: Appropriate affect  HEENT: moist mucous membranes, pupils equal, round, reactive to light and accomodation, posterior oropharynx clear of erythema or exudate, tympanic membranes are pearly grey bilaterally  Lymph: no cervical or supraclavicular lymphadenopathy  Cardiovascular: regular rate and rhythm with no murmur  Pulmonary: clear to auscultation bilaterally with no wheeze  Abdomen: soft, mild global tenderness, non distended with " normo-active bowel sounds  Extremities: warm and well perfused with no edema  Skin: warm and dry with no rash         This note has been dictated and transcribed using voice recognition software.   Any errors in transcription are unintentional and inherent to the software.

## 2021-06-21 NOTE — PROGRESS NOTES
Assessment/Plan:    Britney Singh is a 22 y.o. female presenting for:    1. Encounter for preconception consultation  The patient would like to do progenity carriertesting today.  This has been ordered    2. Encounter for IUD removal  IUD successfully removed.  Please see procedure note below    I personally spent 20 minutes with this patient in addition to the time spent for the IUD removal.  Over half of which was spent in face-to-face counseling and coordination of care        There are no discontinued medications.        Chief Complaint:  Chief Complaint   Patient presents with     IUD Removal       Subjective:   Britney Singh is a pleasant 22-year-old female presenting to the clinic today for IUD removal and preconception counseling.  [place]  The patient and her partner Medina are thinking about attempting to become pregnant.  They do have a sperm donor.  This would be her second pregnancy.  She is wondering about any sort of carrier testing.    She would like her copper IUD out today.  She has done well with it.  Her menstrual cycles are regular.    12 point review of systems completed and negative except for what has been described above    History   Smoking Status     Former Smoker   Smokeless Tobacco     Never Used       Current Outpatient Prescriptions   Medication Sig     albuterol (PROAIR HFA;PROVENTIL HFA;VENTOLIN HFA) 90 mcg/actuation inhaler Inhale 2 puffs every 4 (four) hours as needed for wheezing.     hydrOXYzine HCl (ATARAX) 25 MG tablet Take 1 tablet (25 mg total) by mouth 3 (three) times a day as needed for anxiety.     LORazepam (ATIVAN) 0.5 MG tablet Take 0.5 tablets (0.25 mg total) by mouth daily as needed for anxiety.     mirtazapine (REMERON) 15 MG tablet Take 0.5 tablets (7.5 mg total) by mouth at bedtime.     ondansetron (ZOFRAN) 4 MG tablet Take 1 tablet (4 mg total) by mouth daily as needed for nausea.         Objective:  Vitals:    10/30/18 1458   BP: 110/62   Pulse: 68   Resp:  "12   Temp: 98.6  F (37  C)   TempSrc: Oral   Weight: 163 lb 3 oz (74 kg)   Height: 5' 9.25\" (1.759 m)       Body mass index is 23.92 kg/(m^2).    Vital signs reviewed and stable  General: No acute distress  Psych: Appropriate affect  HEENT: moist mucous membranes  Lymph: no cervical or supraclavicular lymphadenopathy  Cardiovascular: regular rate and rhythm with no murmur  Pulmonary: clear to auscultation bilaterally with no wheeze  Abdomen: soft, non tender, non distended with normo-active bowel sounds  Extremities: warm and well perfused with no edema  Skin: warm and dry with no rash    IUD removal procedure note    Pre-operative Diagnosis: desire IUD removal    Post-operative Diagnosis: IUD removed    Indications: desires pregnancy    Procedure Details   The risks (including infection, bleeding, pain) and benefits of the procedure were explained to the patient and verbal informed consent was obtained.    The patient was laid in the supine position.  A sterile speculum was inserted into the vaginal canal.  The IUD strings were easily visualized, grasped with a ring forceps, and the IUD was gently removed in its entirety.    Condition:  Stable    Complications:  None    Plan:    The patient was advised to call for any fever or for prolonged or severe pain or bleeding. She was advised to use NSAID as needed for mild to moderate pain.        This note has been dictated and transcribed using voice recognition software.   Any errors in transcription are unintentional and inherent to the software.  "

## 2021-06-25 NOTE — TELEPHONE ENCOUNTER
RN cannot approve Refill Request    RN can NOT refill this medication med is not covered by policy/route to provider. Last office visit: 9/11/2020 Anushka Arnold MD Last Physical: 9/4/2019 Last MTM visit: Visit date not found Last visit same specialty: 9/11/2020 Anushka Arnold MD.  Next visit within 3 mo: Visit date not found  Next physical within 3 mo: Visit date not found      [facility]ira Courtney, [facility] Triage/Med Refill 6/9/2021    Requested Prescriptions   Pending Prescriptions Disp Refills     NORLYDA 0.35 mg tablet [Pharmacy Med Name: NORLYDA 0.35MG TABLET] 90 tablet 2     Sig: TAKE 1 TABLET BY MOUTH DAILY       There is no refill protocol information for this order

## 2021-06-26 ENCOUNTER — MYC REFILL (OUTPATIENT)
Dept: FAMILY MEDICINE | Facility: CLINIC | Age: 26
End: 2021-06-26

## 2021-06-26 DIAGNOSIS — F41.9 ANXIETY: ICD-10-CM

## 2021-06-28 RX ORDER — CLONAZEPAM 0.5 MG/1
0.5 TABLET ORAL 2 TIMES DAILY PRN
Qty: 30 TABLET | Refills: 0 | Status: SHIPPED | OUTPATIENT
Start: 2021-06-28 | End: 2021-07-23

## 2021-07-03 NOTE — ADDENDUM NOTE
Addendum Note by Anushka Arnold MD at 8/2/2017  5:34 PM     Author: Anushka Arnold MD Service: -- Author Type: Physician    Filed: 8/2/2017  5:34 PM Encounter Date: 8/1/2017 Status: Signed    : Anushka Arnold MD (Physician)    Addended by: ANUSHKA ARNOLD on: 8/2/2017 05:34 PM        Modules accepted: Orders

## 2021-07-03 NOTE — ADDENDUM NOTE
Addendum Note by Anushka Arnold MD at 6/4/2018  5:40 PM     Author: Anushka Arnold MD Service: -- Author Type: Physician    Filed: 6/4/2018  5:40 PM Encounter Date: 6/3/2018 Status: Signed    : Anushka Arnold MD (Physician)    Addended by: ANUSHKA ARNOLD on: 6/4/2018 05:40 PM        Modules accepted: Orders

## 2021-07-03 NOTE — ADDENDUM NOTE
Addendum Note by Anushka Arnold MD at 6/5/2018  7:55 AM     Author: Anushka Arnold MD Service: -- Author Type: Physician    Filed: 6/5/2018  7:55 AM Encounter Date: 6/3/2018 Status: Signed    : Anushka Arnold MD (Physician)    Addended by: ANUSHKA ARNOLD on: 6/5/2018 07:55 AM        Modules accepted: Orders

## 2021-07-03 NOTE — ADDENDUM NOTE
Addendum Note by Anushka Arnold MD at 7/24/2017  4:40 PM     Author: Anushka Arnold MD Service: -- Author Type: Physician    Filed: 7/24/2017  4:40 PM Encounter Date: 7/24/2017 Status: Signed    : Anushka Arnold MD (Physician)    Addended by: ANUSHKA ARNOLD on: 7/24/2017 04:40 PM        Modules accepted: Orders

## 2021-07-23 ENCOUNTER — MYC REFILL (OUTPATIENT)
Dept: FAMILY MEDICINE | Facility: CLINIC | Age: 26
End: 2021-07-23

## 2021-07-23 DIAGNOSIS — G43.809 OTHER MIGRAINE WITHOUT STATUS MIGRAINOSUS, NOT INTRACTABLE: ICD-10-CM

## 2021-07-23 DIAGNOSIS — F41.9 ANXIETY: ICD-10-CM

## 2021-07-23 RX ORDER — SUMATRIPTAN 50 MG/1
50 TABLET, FILM COATED ORAL
Qty: 10 TABLET | Refills: 3 | Status: SHIPPED | OUTPATIENT
Start: 2021-07-23 | End: 2021-08-24

## 2021-07-23 RX ORDER — PRAZOSIN HYDROCHLORIDE 1 MG/1
1-2 CAPSULE ORAL
Qty: 60 CAPSULE | Refills: 4 | Status: SHIPPED | OUTPATIENT
Start: 2021-07-23 | End: 2021-08-24

## 2021-07-23 RX ORDER — CLONAZEPAM 0.5 MG/1
0.5 TABLET ORAL 2 TIMES DAILY PRN
Qty: 30 TABLET | Refills: 0 | Status: SHIPPED | OUTPATIENT
Start: 2021-07-23 | End: 2021-08-24

## 2021-07-23 NOTE — TELEPHONE ENCOUNTER
Routing refill request to provider for review/approval because:  Drug not on the [facility] refill protocol     Maximino Clemente RN

## 2021-07-23 NOTE — TELEPHONE ENCOUNTER
Routing refill request to provider for review/approval because:  PHQ9: 18 on 1/2021    Maximino Clemente RN

## 2021-08-24 ENCOUNTER — VIRTUAL VISIT (OUTPATIENT)
Dept: FAMILY MEDICINE | Facility: CLINIC | Age: 26
End: 2021-08-24
Payer: COMMERCIAL

## 2021-08-24 DIAGNOSIS — N89.8 VAGINAL DISCHARGE: ICD-10-CM

## 2021-08-24 DIAGNOSIS — G43.809 OTHER MIGRAINE WITHOUT STATUS MIGRAINOSUS, NOT INTRACTABLE: ICD-10-CM

## 2021-08-24 DIAGNOSIS — N39.3 FEMALE STRESS INCONTINENCE: Primary | ICD-10-CM

## 2021-08-24 DIAGNOSIS — F41.9 ANXIETY: ICD-10-CM

## 2021-08-24 PROCEDURE — 99214 OFFICE O/P EST MOD 30 MIN: CPT | Mod: 95 | Performed by: FAMILY MEDICINE

## 2021-08-24 RX ORDER — CLONAZEPAM 0.5 MG/1
0.5 TABLET ORAL 2 TIMES DAILY PRN
Qty: 30 TABLET | Refills: 0 | Status: SHIPPED | OUTPATIENT
Start: 2021-08-24 | End: 2021-09-27

## 2021-08-24 RX ORDER — METRONIDAZOLE 7.5 MG/G
GEL VAGINAL
Qty: 140 G | Refills: 1 | Status: SHIPPED | OUTPATIENT
Start: 2021-08-24 | End: 2022-03-09

## 2021-08-24 RX ORDER — SUMATRIPTAN 50 MG/1
50 TABLET, FILM COATED ORAL
Qty: 10 TABLET | Refills: 3 | Status: SHIPPED | OUTPATIENT
Start: 2021-08-24 | End: 2021-12-31

## 2021-08-24 RX ORDER — PRAZOSIN HYDROCHLORIDE 1 MG/1
1-2 CAPSULE ORAL
Qty: 60 CAPSULE | Refills: 4 | Status: SHIPPED | OUTPATIENT
Start: 2021-08-24 | End: 2022-01-27

## 2021-08-24 ASSESSMENT — ASTHMA QUESTIONNAIRES
QUESTION_5 LAST FOUR WEEKS HOW WOULD YOU RATE YOUR ASTHMA CONTROL: COMPLETELY CONTROLLED
QUESTION_2 LAST FOUR WEEKS HOW OFTEN HAVE YOU HAD SHORTNESS OF BREATH: NOT AT ALL
QUESTION_3 LAST FOUR WEEKS HOW OFTEN DID YOUR ASTHMA SYMPTOMS (WHEEZING, COUGHING, SHORTNESS OF BREATH, CHEST TIGHTNESS OR PAIN) WAKE YOU UP AT NIGHT OR EARLIER THAN USUAL IN THE MORNING: NOT AT ALL
QUESTION_1 LAST FOUR WEEKS HOW MUCH OF THE TIME DID YOUR ASTHMA KEEP YOU FROM GETTING AS MUCH DONE AT WORK, SCHOOL OR AT HOME: A LITTLE OF THE TIME
ACT_TOTALSCORE: 24
QUESTION_4 LAST FOUR WEEKS HOW OFTEN HAVE YOU USED YOUR RESCUE INHALER OR NEBULIZER MEDICATION (SUCH AS ALBUTEROL): NOT AT ALL

## 2021-08-24 ASSESSMENT — ANXIETY QUESTIONNAIRES
IF YOU CHECKED OFF ANY PROBLEMS ON THIS QUESTIONNAIRE, HOW DIFFICULT HAVE THESE PROBLEMS MADE IT FOR YOU TO DO YOUR WORK, TAKE CARE OF THINGS AT HOME, OR GET ALONG WITH OTHER PEOPLE: EXTREMELY DIFFICULT
3. WORRYING TOO MUCH ABOUT DIFFERENT THINGS: NEARLY EVERY DAY
5. BEING SO RESTLESS THAT IT IS HARD TO SIT STILL: SEVERAL DAYS
1. FEELING NERVOUS, ANXIOUS, OR ON EDGE: MORE THAN HALF THE DAYS
GAD7 TOTAL SCORE: 15
2. NOT BEING ABLE TO STOP OR CONTROL WORRYING: NEARLY EVERY DAY
7. FEELING AFRAID AS IF SOMETHING AWFUL MIGHT HAPPEN: MORE THAN HALF THE DAYS
6. BECOMING EASILY ANNOYED OR IRRITABLE: SEVERAL DAYS

## 2021-08-24 ASSESSMENT — PATIENT HEALTH QUESTIONNAIRE - PHQ9
SUM OF ALL RESPONSES TO PHQ QUESTIONS 1-9: 17
5. POOR APPETITE OR OVEREATING: NEARLY EVERY DAY

## 2021-08-24 NOTE — PROGRESS NOTES
Britney is a 25 year old who is being evaluated via a billable video visit.      How would you like to obtain your AVS? MyChart  If the video visit is dropped, the invitation should be resent by: Text to cell phone: [phone]  Will anyone else be joining your video visit? No      Video Start Time: 250    -------------------------    Assessment/Plan:    Britney Singh is a 25 year old female presenting for:    Anxiety  Refill - doing well.  No concerns.  Follow with psychology  - clonazePAM (KLONOPIN) 0.5 MG tablet  Dispense: 30 tablet; Refill: 0  - prazosin (MINIPRESS) 1 MG capsule  Dispense: 60 capsule; Refill: 4    Other migraine without status migrainosus, not intractable  refill  - SUMAtriptan (IMITREX) 50 MG tablet  Dispense: 10 tablet; Refill: 3    Vaginal discharge  Sounds to be reoccuring BV - longer weekly course sent to pharmacy  - metroNIDAZOLE (METROGEL) 0.75 % vaginal gel  Dispense: 140 g; Refill: 1    Female stress incontinence  Discussed exercises at home - referral to PT for pelvic floor  - Physical Therapy Referral        Medications Discontinued During This Encounter   Medication Reason     ALPRAZolam (XANAX) 0.25 MG tablet      metroNIDAZOLE (METROGEL) 0.75 % vaginal gel Reorder     clonazePAM (KLONOPIN) 0.5 MG tablet Reorder     SUMAtriptan (IMITREX) 50 MG tablet Reorder     prazosin (MINIPRESS) 1 MG capsule Reorder           Chief Complaint:  Recheck Medication and Musculoskeletal Problem          Subjective:   Britney Singh is a pleasant 25 year old female being evaluated via video visit today for the following concern/s:      1.  Anxiety: Patient has a past medical history significant for anxiety.  She is been on many different medications in the past but is currently using hydroxyzine occasionally, clonazepam and prazosin at night to help with nightmares.  She is seeing to psychologist as well.  She feels that her anxiety is moderately well controlled.  She has a good support  system.    2.  Migraines: History of migraines and uses occasional Imitrex.  Would like a refill today.    3.  Vaginal discharge: Patient notes she has a slightly foul-smelling vaginal discharge.  She has a history of BV in the past and feels as though this is the same.  Hopeful to get a vaginal gel to treat.    4.  Incontinence: Patient notes that when she was shopping with her daughter she had some urinary incontinence.  She is wondering about seeing a specialist to help with that.  She does not necessarily notice it in other scenarios.  Potentially when she is running quickly she will notice slight incontinence as well.      12 point review of systems completed and negative except for what has been described above    History   Smoking Status     Former Smoker     Quit date: 1/7/2014   Smokeless Tobacco     Never Used         Current Outpatient Medications:      albuterol (PROAIR HFA/PROVENTIL HFA/VENTOLIN HFA) 108 (90 Base) MCG/ACT inhaler, Inhale 2 puffs into the lungs every 6 hours as needed, Disp: , Rfl:      clonazePAM (KLONOPIN) 0.5 MG tablet, Take 1 tablet (0.5 mg) by mouth 2 times daily as needed for anxiety, Disp: 30 tablet, Rfl: 0     DEBLITANE 0.35 MG tablet, Take 0.35 mg by mouth daily, Disp: , Rfl:      Diaphragm Arc-Spring (CAYA) DPR[facility], Place 1 Units vaginally, Disp: , Rfl:      hydrOXYzine (ATARAX) 25 MG tablet, Take 25 mg by mouth 3 times daily as needed, Disp: , Rfl:      metroNIDAZOLE (METROGEL) 0.75 % vaginal gel, 1 applicator vaginally daily for 7 days and then twice weekly for 3 months, Disp: 140 g, Rfl: 1     ondansetron (ZOFRAN) 4 MG tablet, Take 4 mg by mouth 3 times daily as needed, Disp: , Rfl:      prazosin (MINIPRESS) 1 MG capsule, Take 1-2 capsules (1-2 mg) by mouth nightly as needed, Disp: 60 capsule, Rfl: 4     SUMAtriptan (IMITREX) 50 MG tablet, Take 1 tablet (50 mg) by mouth at onset of headache for migraine May repeat in 2 hours. Max 4 tablets/24 hours., Disp: 10 tablet, Rfl:  3        Objective:  No vitals were done due to the nature of this visit  No flowsheet data found.            General: No acute distress  Psych: Appropriate affect  HEENT: moist mucous membranes  Pulmonary: Breathing comfortably, speaking in complete sentences  Extremities: warm and well perfused with no edema  Skin: warm and dry with no rash         This note has been dictated and transcribed using voice recognition software.   Any errors in transcription are unintentional and inherent to the software.      Video-Visit Details    Type of service:  Video Visit    Video End Time:320    Originating Location (pt. Location): Home    Distant Location (provider location):  [facility]     Platform used for Video Visit: [place]qua

## 2021-08-25 ASSESSMENT — ASTHMA QUESTIONNAIRES: ACT_TOTALSCORE: 24

## 2021-08-25 ASSESSMENT — ANXIETY QUESTIONNAIRES: GAD7 TOTAL SCORE: 15

## 2021-08-31 DIAGNOSIS — F41.9 ANXIETY: Primary | ICD-10-CM

## 2021-08-31 RX ORDER — HYDROXYZINE HYDROCHLORIDE 25 MG/1
TABLET, FILM COATED ORAL
Qty: 30 TABLET | Refills: 1 | Status: SHIPPED | OUTPATIENT
Start: 2021-08-31 | End: 2021-10-25

## 2021-08-31 NOTE — TELEPHONE ENCOUNTER
Routing refill request to provider for review/approval because:  Drug not on the [facility] refill protocol   Suresh Ortiz RN

## 2021-09-02 ENCOUNTER — HOSPITAL ENCOUNTER (OUTPATIENT)
Dept: PHYSICAL THERAPY | Facility: CLINIC | Age: 26
Setting detail: THERAPIES SERIES
End: 2021-09-02
Attending: FAMILY MEDICINE
Payer: COMMERCIAL

## 2021-09-02 DIAGNOSIS — N39.3 FEMALE STRESS INCONTINENCE: ICD-10-CM

## 2021-09-02 PROCEDURE — 97110 THERAPEUTIC EXERCISES: CPT | Mod: GP

## 2021-09-02 PROCEDURE — 97162 PT EVAL MOD COMPLEX 30 MIN: CPT | Mod: GP

## 2021-09-02 NOTE — PLAN OF CARE
[facility]      OUTPATIENT PHYSICAL THERAPY ORTHOPEDIC EVALUATION  PLAN OF TREATMENT FOR OUTPATIENT REHABILITATION  (COMPLETE FOR INITIAL CLAIMS ONLY)  Patient's Last Name, First Name, M.I.  YOB: 1995  Britney Singh    Provider s Name:  [facility]   Medical Record No.  [id number]   Start of Care Date:  09/02/21   Onset Date:  08/24/21   Type:     _X__PT   ___OT   ___SLP Medical Diagnosis:  Female stress incontinence      PT Diagnosis:  PFM weakness with poor coordination   Visits from SOC:  1      _________________________________________________________________________________  Plan of Treatment/Functional Goals:  ADL retraining, joint mobilization, gait training, manual therapy, neuromuscular re-education, ROM, strengthening, stretching     Biofeedback, Cryotherapy, Hot packs     Goals  Goal Identifier: STG 1  Goal Description: Pt will report hip pain no greater than 6/10  Target Date: 09/30/21    Goal Identifier: LTG 1  Goal Description: Pt will report hip pain no greater than 3/10  Target Date: 11/25/21    Goal Identifier: STG 2  Goal Description: Pt will report no urine leaking for 2 weeks for improved continence  Target Date: 09/30/21    Goal Identifier: LTG 2  Goal Description: Pt will report no urine leaking for one month for improved continence  Target Date: 11/25/21    Goal Identifier: STG 3  Goal Description: Pt will improve general LE and pelvic floor strength to at least 3+/5 for improved muscle performance and decreased pain  Target Date: 10/14/21    Goal Identifier: LTG 3  Goal Description: Pt will improve general LE and pelvic floor strength to at least 4/5 for improved muscle performance and decreased pain  Target Date: 11/25/21     Therapy Frequency:  1 time/week  Predicted Duration of Therapy Intervention:  12 wk    BEBO MCDANIEL PT                 I CERTIFY THE NEED FOR THESE SERVICES FURNISHED UNDER        THIS  PLAN OF TREATMENT AND WHILE UNDER MY CARE     (Physician co-signature of this document indicates review and certification of the therapy plan).                       Certification Date From:  09/02/21   Certification Date To:  11/25/21    Referring Provider:  Anushka Arnold MD    Initial Assessment        See Epic Evaluation Start of Care Date: 09/02/21

## 2021-09-02 NOTE — PROGRESS NOTES
"PT Initial Eval     09/02/21 1000   General Information   Type of Visit Initial OP Ortho PT Evaluation   Start of Care Date 09/02/21   Referring Physician Anushka Arnold MD   Patient/Family Goals Statement Assess root of problem, get input into how to improve health.   Orders Evaluate and Treat   Date of Order 08/24/21   Certification Required? Yes   Medical Diagnosis Female stress incontinence    Surgical/Medical history reviewed Yes   Precautions/Limitations other (see comments)  (Hx of eating disorder, PTSD, depression)   Body Part(s)   Body Part(s) Pelvic Floor Dysfunction;Hip   Presentation and Etiology   Pertinent history of current problem (include personal factors and/or comorbidities that impact the POC) Pt reports chronic B hip pain that has become more disruptive. Reports some clicking and popping. She says that sitting in certain positions is uncomfortable and she can feel numbness in her thighs with prolonged sitting. She reports incontinence when jumping rope with her daughter. She denies other leaking incidents. Reports she is double voiding to fully empty when urinating. She reports feeling \"heaviness\" and pressure in pelvis during her period that affects her ability to empty bowel and bladder. Denies pain while sexually active.    Impairments A. Pain;E. Decreased flexibility;H. Impaired gait;L. Tingling;K. Numbness;P. Bowel or bladder problems;M. Locking or catching   Functional Limitations perform activities of daily living;perform required work activities;perform desired leisure / sports activities   Symptom Location B hip, pelvis   How/Where did it occur From insidious onset   Onset date of current episode/exacerbation 08/24/21   Chronicity Chronic   Pain rating (0-10 point scale) Worst (/10);Best (/10)   Best (/10) 5   Worst (/10) 10   Pain quality A. Sharp;E. Shooting;F. Stabbing   Frequency of pain/symptoms A. Constant   Pain/symptoms exacerbated by B. Walking;D. Carrying;G. Certain " positions;I. Bending;J. ADL;L. Work tasks;K. Home tasks   Pain/symptoms eased by D. Nothing   Current Level of Function   Current Community Support Family/friend caregiver   Patient role/employment history A. Employed;B. Student;C. 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   Fall Risk Screen   Fall screen completed by PT   Have you fallen 2 or more times in the past year? No   Have you fallen and had an injury in the past year? Yes  (Tripped over blanket and broke 1st toe)   Timed Up and Go score (seconds) 9 sec   Is patient a fall risk? No   Abuse Screen (yes response referral indicated)   Feels Unsafe at Home or Work/School no   Feels Threatened by Someone no   Does Anyone Try to Keep You From Having Contact with Others or Doing Things Outside Your Home? no   Physical Signs of Abuse Present no   System Outcome Measures   Outcome Measures   (PFDI-20: 13)   Hip Objective Findings   Side (if bilateral, select both right and left) Right;Left   Hip ROM Comments B hip PROM WFL, ER>IR, pain with passive extension   Neurological Testing Comments sensation intact, neuroscreen negative    Right Hip Flexion Strength 4/5*   Right Hip Abduction Strength 3/5 glute med   Right Hip Extension Strength 4/5 glute max   Right Knee Extension Strength 5/5   Obers/ITB Flexibility + B for short/stiff ITB   Left Hip Flexion Strength 4/5   Left Hip Abduction Strength 3/5 glute med   Left Hip Extension Strength 4/5 glute max   Left Knee Extension Strength 5/5   Pelvic Floor Dysfunction Questions   Regular exercise Yes  (Playing with daughter, gardening, walks)   Fluid intake-glasses/day (one glass/cup = 8oz 1.5-2 L water/day   Caffeinated beverages-glasses/day 12 oz soda, 8 oz milk, colby lattes occasionally   Alcoholic beverages - glasses/day 0   Recent diet change? Yes  (Yes, working with eating disorder dietician)   How long can you delay the need to urinate?  60 min   How many times  "do you wake to urinate at night?   1-2 times/night   How often do you urinate during the day?   Every 2 hours   Can you stop the flow of urine when on the toilet?  Yes   Is the volume of urine passed usually  Medium   Do you have the sensation that you need to go to the toilet?  Yes   Do you empty your bladder frequently, before you experience the urge to pass urine?  Yes   Do you have \"triggers\" that make you feel you can't wait to go to the toilet?  No   Number of bladder infections last year?  0   Frequency of bowel movements:  twice a day   Consistency of stool?  Soft  (BSC IV)   Do you ignore the urge to defecate?  No   Women's Health Questions   Number of pregnancies  1   Number of vaginal deliveries  1   Number of  section deliveries  0   Weight of largest baby  8lb 14 oz   Number of episiotomies  2 tears with stitching    Pelvic Floor Dysfunction Objective Findings   Pain-pelvic dysfunction Pelvic pain   Observation Knack with cough absent, struggle to coordinate quick flicks with poor relaxing PFM and abdominal engagement. Negative for prolapse   Areas of Tightness   (Mos tension along B OI internally, pt denies pain)   Type of Storage Problem stress incontinence   Type of Emptying Problem strain to void;hesitancy   Protection needed None   Power (MMT at Levator Ani) 3+/5   Endurance (Up to 10 seconds as long as still 50% power) 7 sec   Fast Twitch (Number of 1 second contractions can do in 10 seconds. Norm=7 reps) unable to coordinate   Elevation (Able to lift posterior vaginal wall toward head and public bone) Present   Pelvic Pt provided verbal and written consent for pelvic floor exam, including internal vaginal assesment   Planned Therapy Interventions   Planned Therapy Interventions ADL retraining;joint mobilization;gait training;manual therapy;neuromuscular re-education;ROM;strengthening;stretching   Planned Modality Interventions   Planned Modality Interventions Biofeedback;Cryotherapy;Hot " packs   Clinical Impression   Criteria for Skilled Therapeutic Interventions Met yes, treatment indicated   PT Diagnosis PFM weakness with poor coordination   Influenced by the following impairments PFM weakness, poor coordination of PFM, hip weakness, pain   Functional limitations due to impairments Pain with sitting for school, leaking when playing with daughter   Clinical Presentation Evolving/Changing   Clinical Presentation Rationale d/t multiple body areas being involved   Clinical Decision Making (Complexity) Moderate complexity   Therapy Frequency 1 time/week   Predicted Duration of Therapy Intervention (days/wks) 12 wk   Risk & Benefits of therapy have been explained Yes   Patient, Family & other staff in agreement with plan of care Yes   Clinical Impression Comments Pt presents with reports of MARY when jumping rope and B chronic hip pain. On exam, she presents with proximal hip weakness, as well as pelvic floor muscle weakness and coordination impairments. She is a good candidate for skilled PT to improve her condition and increase function.   Education Assessment   Preferred Learning Style Demonstration;Pictures/video   Barriers to Learning No barriers   ORTHO GOALS   PT Ortho Eval Goals 1;2;4;3;6;5   Ortho Goal 1   Goal Identifier STG 1   Goal Description Pt will report hip pain no greater than 6/10   Target Date 09/30/21   Ortho Goal 2   Goal Identifier LTG 1   Goal Description Pt will report hip pain no greater than 3/10   Target Date 11/25/21   Ortho Goal 3   Goal Identifier STG 2   Goal Description Pt will report no urine leaking for 2 weeks for improved continence   Target Date 09/30/21   Ortho Goal 4   Goal Identifier LTG 2   Goal Description Pt will report no urine leaking for one month for improved continence   Target Date 11/25/21   Ortho Goal 5   Goal Identifier STG 3   Goal Description Pt will improve general LE and pelvic floor strength to at least 3+/5 for improved muscle performance and  decreased pain   Target Date 10/14/21   Ortho Goal 6   Goal Identifier LTG 3   Goal Description Pt will improve general LE and pelvic floor strength to at least 4/5 for improved muscle performance and decreased pain   Target Date 11/25/21   Total Evaluation Time   PT Eval, Moderate Complexity Minutes (69290) 30   Therapy Certification   Certification date from 09/02/21   Certification date to 11/25/21   Medical Diagnosis Female stress incontinence      Cristy Harden, PT, DPT

## 2021-09-04 ENCOUNTER — E-VISIT (OUTPATIENT)
Dept: FAMILY MEDICINE | Facility: CLINIC | Age: 26
End: 2021-09-04
Payer: COMMERCIAL

## 2021-09-04 DIAGNOSIS — F32.1 MODERATE MAJOR DEPRESSION (H): Primary | ICD-10-CM

## 2021-09-04 PROCEDURE — 99422 OL DIG E/M SVC 11-20 MIN: CPT | Performed by: FAMILY MEDICINE

## 2021-09-04 ASSESSMENT — PATIENT HEALTH QUESTIONNAIRE - PHQ9
10. IF YOU CHECKED OFF ANY PROBLEMS, HOW DIFFICULT HAVE THESE PROBLEMS MADE IT FOR YOU TO DO YOUR WORK, TAKE CARE OF THINGS AT HOME, OR GET ALONG WITH OTHER PEOPLE: EXTREMELY DIFFICULT
SUM OF ALL RESPONSES TO PHQ QUESTIONS 1-9: 26
SUM OF ALL RESPONSES TO PHQ QUESTIONS 1-9: 26

## 2021-09-04 ASSESSMENT — ANXIETY QUESTIONNAIRES
GAD7 TOTAL SCORE: 18
6. BECOMING EASILY ANNOYED OR IRRITABLE: NEARLY EVERY DAY
GAD7 TOTAL SCORE: 18
7. FEELING AFRAID AS IF SOMETHING AWFUL MIGHT HAPPEN: NEARLY EVERY DAY
5. BEING SO RESTLESS THAT IT IS HARD TO SIT STILL: SEVERAL DAYS
8. IF YOU CHECKED OFF ANY PROBLEMS, HOW DIFFICULT HAVE THESE MADE IT FOR YOU TO DO YOUR WORK, TAKE CARE OF THINGS AT HOME, OR GET ALONG WITH OTHER PEOPLE?: EXTREMELY DIFFICULT
3. WORRYING TOO MUCH ABOUT DIFFERENT THINGS: MORE THAN HALF THE DAYS
GAD7 TOTAL SCORE: 18
2. NOT BEING ABLE TO STOP OR CONTROL WORRYING: NEARLY EVERY DAY
4. TROUBLE RELAXING: NEARLY EVERY DAY
7. FEELING AFRAID AS IF SOMETHING AWFUL MIGHT HAPPEN: NEARLY EVERY DAY
1. FEELING NERVOUS, ANXIOUS, OR ON EDGE: NEARLY EVERY DAY

## 2021-09-05 ENCOUNTER — HEALTH MAINTENANCE LETTER (OUTPATIENT)
Age: 26
End: 2021-09-05

## 2021-09-05 RX ORDER — BUPROPION HYDROCHLORIDE 150 MG/1
150 TABLET ORAL EVERY MORNING
Qty: 30 TABLET | Refills: 0 | Status: SHIPPED | OUTPATIENT
Start: 2021-09-05 | End: 2021-09-14

## 2021-09-05 ASSESSMENT — PATIENT HEALTH QUESTIONNAIRE - PHQ9: SUM OF ALL RESPONSES TO PHQ QUESTIONS 1-9: 26

## 2021-09-05 ASSESSMENT — ANXIETY QUESTIONNAIRES: GAD7 TOTAL SCORE: 18

## 2021-09-06 NOTE — PATIENT INSTRUCTIONS
Britney,    I am so sorry to hear about your losses and that you have been feeling depressed.  It is a bit tricky because I know you have not responded well to may medications but I agree that Wellbutrin might be a good choice.  I will send this to the pharmacy and you can start taking ASAP and maybe follow up with a video visit in 4-6 weeks (or of course sooner if needed)!    Dr Arnold

## 2021-09-08 ENCOUNTER — ALLIED HEALTH/NURSE VISIT (OUTPATIENT)
Dept: FAMILY MEDICINE | Facility: CLINIC | Age: 26
End: 2021-09-08
Payer: COMMERCIAL

## 2021-09-08 DIAGNOSIS — Z23 NEEDS FLU SHOT: Primary | ICD-10-CM

## 2021-09-08 PROCEDURE — 90471 IMMUNIZATION ADMIN: CPT

## 2021-09-08 PROCEDURE — 99207 PR NO CHARGE NURSE ONLY: CPT

## 2021-09-08 PROCEDURE — 90686 IIV4 VACC NO PRSV 0.5 ML IM: CPT

## 2021-09-10 ENCOUNTER — HOSPITAL ENCOUNTER (OUTPATIENT)
Dept: PHYSICAL THERAPY | Facility: CLINIC | Age: 26
Setting detail: THERAPIES SERIES
End: 2021-09-10
Attending: FAMILY MEDICINE
Payer: COMMERCIAL

## 2021-09-10 PROCEDURE — 97110 THERAPEUTIC EXERCISES: CPT | Mod: GP

## 2021-09-10 PROCEDURE — 90912 BFB TRAINING 1ST 15 MIN: CPT | Mod: GP

## 2021-09-10 PROCEDURE — 90913 BFB TRAINING EA ADDL 15 MIN: CPT | Mod: GP

## 2021-09-14 ENCOUNTER — VIRTUAL VISIT (OUTPATIENT)
Dept: FAMILY MEDICINE | Facility: CLINIC | Age: 26
End: 2021-09-14
Payer: COMMERCIAL

## 2021-09-14 DIAGNOSIS — F32.1 MODERATE MAJOR DEPRESSION (H): Primary | ICD-10-CM

## 2021-09-14 DIAGNOSIS — R11.0 NAUSEA: ICD-10-CM

## 2021-09-14 DIAGNOSIS — Z00.00 HEALTHCARE MAINTENANCE: ICD-10-CM

## 2021-09-14 PROCEDURE — 99214 OFFICE O/P EST MOD 30 MIN: CPT | Mod: 95 | Performed by: FAMILY MEDICINE

## 2021-09-14 RX ORDER — MIRTAZAPINE 15 MG/1
15 TABLET, FILM COATED ORAL AT BEDTIME
Qty: 30 TABLET | Refills: 3 | Status: SHIPPED | OUTPATIENT
Start: 2021-09-14 | End: 2022-03-09 | Stop reason: SINTOL

## 2021-09-14 RX ORDER — ONDANSETRON 4 MG/1
4 TABLET, FILM COATED ORAL 3 TIMES DAILY PRN
Qty: 30 TABLET | Refills: 1 | Status: SHIPPED | OUTPATIENT
Start: 2021-09-14 | End: 2022-11-28

## 2021-09-14 RX ORDER — DIAPHRAGMS, CONTOURED 65 MM-80MM
1 DIAPHRAGM VAGINAL DAILY PRN
Qty: 1 EACH | Refills: 1 | Status: SHIPPED | OUTPATIENT
Start: 2021-09-14 | End: 2023-11-20

## 2021-09-14 NOTE — PROGRESS NOTES
Britney is a 25 year old who is being evaluated via a billable video visit.      How would you like to obtain your AVS? Quintonhart  If the video visit is dropped, the invitation should be resent by: Text to cell phone: [phone]  Will anyone else be joining your video visit? No      Video Start Time: 2:43 PM    -------------------------    Assessment/Plan:    Britney Singh is a 25 year old female presenting for:    Moderate major depression (H)  Referral to psychiatry placed.  Patient will immediately stop taking her Wellbutrin.  She will continue to see her therapist twice weekly.  Discussed inpatient programs as well but she has not feeling though she needing that at this point.  Mirtazapine sent to the pharmacy.  We also discussed Seroquel as a medication that may help stabilize her mood.    Ultimately, she needs to be followed by psychiatry for dosing adjustments.  Referral placed.  - mirtazapine (REMERON) 15 MG tablet  Dispense: 30 tablet; Refill: 3  - MENTAL HEALTH REFERRAL  - Adult; Psychiatry; Psychiatry; Collaborative Care Psychiatry Service/Bridge to Long-Term Psychiatry as indicated ([phone]); Yes; Several Medications tried and failed; Yes; We will contact you to schedule the appoin...    Healthcare maintenance  Refill  - Diaphragm Arc-Spring (CAYA) D[facility]  Dispense: 1 each; Refill: 1    Nausea  Refill  - ondansetron (ZOFRAN) 4 MG tablet  Dispense: 30 tablet; Refill: 1        Medications Discontinued During This Encounter   Medication Reason     buPROPion (WELLBUTRIN XL) 150 MG 24 hr tablet      ondansetron (ZOFRAN) 4 MG tablet Reorder     Diaphragm Arc-Spring (CAYA) DPRH Reorder           Chief Complaint:  Recheck Medication and Refill Request          Subjective:   Britney Singh is a pleasant 25 year old female being evaluated via video visit today for the following concern/s:     Depression: Patient is a past medical history significant for depression.  This is actually been fairly stable  without medications for quite some time but recently she had a relative who passed away in a cat who also .  She has been having a very difficult time.  She reached out to me and we started her on Wellbutrin which she had tolerated well in the past.  She has had adverse reactions to many other medications including SSRIs and SNRIs.    The Wellbutrin unfortunately made her anxious and gave her nightmares.  She is still taking it but is hopeful to be able to quit taking it today.  She sees a psychologist twice weekly.  She does have some mild suicidal ideation but no plan.  She does not feel though she needs to be inpatient although would be willing to consider this at some point if needed.    Good support system at home with her boyfriend Jasbir.    12 point review of systems completed and negative except for what has been described above    History   Smoking Status     Former Smoker     Quit date: 2014   Smokeless Tobacco     Never Used         Current Outpatient Medications:      albuterol (PROAIR HFA/PROVENTIL HFA/VENTOLIN HFA) 108 (90 Base) MCG/ACT inhaler, Inhale 2 puffs into the lungs every 6 hours as needed, Disp: , Rfl:      clonazePAM (KLONOPIN) 0.5 MG tablet, Take 1 tablet (0.5 mg) by mouth 2 times daily as needed for anxiety, Disp: 30 tablet, Rfl: 0     DEBLITANE 0.35 MG tablet, Take 0.35 mg by mouth daily, Disp: , Rfl:      Diaphragm Arc-Spring (CAYA) DPR[facility], Place 1 Units vaginally daily as needed, Disp: 1 each, Rfl: 1     hydrOXYzine (ATARAX) 25 MG tablet, TAKE 1 TABLET BY MOUTH THREE TIMES DAILY AS NEEDED FOR ANXIETY, Disp: 30 tablet, Rfl: 1     metroNIDAZOLE (METROGEL) 0.75 % vaginal gel, 1 applicator vaginally daily for 7 days and then twice weekly for 3 months, Disp: 140 g, Rfl: 1     mirtazapine (REMERON) 15 MG tablet, Take 1 tablet (15 mg) by mouth At Bedtime, Disp: 30 tablet, Rfl: 3     ondansetron (ZOFRAN) 4 MG tablet, Take 1 tablet (4 mg) by mouth 3 times daily as needed, Disp: 30 tablet,  Rfl: 1     prazosin (MINIPRESS) 1 MG capsule, Take 1-2 capsules (1-2 mg) by mouth nightly as needed, Disp: 60 capsule, Rfl: 4     SUMAtriptan (IMITREX) 50 MG tablet, Take 1 tablet (50 mg) by mouth at onset of headache for migraine May repeat in 2 hours. Max 4 tablets/24 hours., Disp: 10 tablet, Rfl: 3        Objective:  No vitals were done due to the nature of this visit  No flowsheet data found.            General: No acute distress  Psych: Appropriate affect  HEENT: moist mucous membranes  Pulmonary: Breathing comfortably, speaking in complete sentences  Extremities: warm and well perfused with no edema  Skin: warm and dry with no rash       This note has been dictated and transcribed using voice recognition software.   Any errors in transcription are unintentional and inherent to the software.        Video-Visit Details    Type of service:  Video Visit    Video End Time: 310pm    Originating Location (pt. Location): Home    Distant Location (provider location):  [facility]     Platform used for Video Visit: Geraldine ARDON personally spent over 35 minutes performing care related to this patient on the day of the patient visit.  This includes chart review, precharting, talking with/ examining the patient as well as completing after visit documentation.

## 2021-09-17 ENCOUNTER — HOSPITAL ENCOUNTER (OUTPATIENT)
Dept: PHYSICAL THERAPY | Facility: CLINIC | Age: 26
Setting detail: THERAPIES SERIES
End: 2021-09-17
Attending: FAMILY MEDICINE
Payer: COMMERCIAL

## 2021-09-17 PROCEDURE — 97110 THERAPEUTIC EXERCISES: CPT | Mod: GP

## 2021-09-17 PROCEDURE — 97140 MANUAL THERAPY 1/> REGIONS: CPT | Mod: GP

## 2021-09-23 DIAGNOSIS — F41.9 ANXIETY: ICD-10-CM

## 2021-09-23 DIAGNOSIS — F32.1 MODERATE MAJOR DEPRESSION (H): ICD-10-CM

## 2021-09-23 NOTE — TELEPHONE ENCOUNTER
Routing refill request to provider for review/approval because:  Drug not on the [facility] refill protocol   Drug not active on patient's medication list    Maximino Clemente RN

## 2021-09-23 NOTE — TELEPHONE ENCOUNTER
Can you please confirm that this patient has stopped the Wellbutrin.  We discussed that at the previous visit so I am surprised to see a refill request come through.  Potentially this was just from the pharmacy?    Thanks    EB

## 2021-09-24 ENCOUNTER — HOSPITAL ENCOUNTER (OUTPATIENT)
Dept: PHYSICAL THERAPY | Facility: CLINIC | Age: 26
Setting detail: THERAPIES SERIES
End: 2021-09-24
Attending: FAMILY MEDICINE
Payer: COMMERCIAL

## 2021-09-24 PROCEDURE — 97140 MANUAL THERAPY 1/> REGIONS: CPT | Mod: GP

## 2021-09-27 RX ORDER — CLONAZEPAM 0.5 MG/1
0.5 TABLET ORAL 2 TIMES DAILY PRN
Qty: 30 TABLET | Refills: 0 | Status: SHIPPED | OUTPATIENT
Start: 2021-09-27 | End: 2021-10-25

## 2021-09-27 RX ORDER — BUPROPION HYDROCHLORIDE 150 MG/1
TABLET ORAL
Qty: 30 TABLET | Refills: 0 | OUTPATIENT
Start: 2021-09-27

## 2021-09-27 NOTE — TELEPHONE ENCOUNTER
Call placed to patient  Relayed Dr. Arnold message    Patient confirms that she is no longer taking the Wellbutrin  States this must have been an automatic refill request from her pharmacy - patient will call and cancel automatic refills on medication    Will forward to Dr. Clayton Clemente, RN

## 2021-10-03 ASSESSMENT — ENCOUNTER SYMPTOMS
BREAST MASS: 0
CHILLS: 0
DIZZINESS: 1
MYALGIAS: 1
CONSTIPATION: 0
SORE THROAT: 0
SHORTNESS OF BREATH: 0
HEMATOCHEZIA: 0
HEARTBURN: 1
ABDOMINAL PAIN: 1
COUGH: 0
PARESTHESIAS: 0
FEVER: 0
NERVOUS/ANXIOUS: 1
ARTHRALGIAS: 1
EYE PAIN: 0
DYSURIA: 0
HEADACHES: 1
NAUSEA: 0
HEMATURIA: 0
DIARRHEA: 0
WEAKNESS: 0
FREQUENCY: 0
PALPITATIONS: 0
JOINT SWELLING: 0

## 2021-10-03 ASSESSMENT — PATIENT HEALTH QUESTIONNAIRE - PHQ9
SUM OF ALL RESPONSES TO PHQ QUESTIONS 1-9: 21
SUM OF ALL RESPONSES TO PHQ QUESTIONS 1-9: 21
10. IF YOU CHECKED OFF ANY PROBLEMS, HOW DIFFICULT HAVE THESE PROBLEMS MADE IT FOR YOU TO DO YOUR WORK, TAKE CARE OF THINGS AT HOME, OR GET ALONG WITH OTHER PEOPLE: EXTREMELY DIFFICULT

## 2021-10-04 ASSESSMENT — PATIENT HEALTH QUESTIONNAIRE - PHQ9: SUM OF ALL RESPONSES TO PHQ QUESTIONS 1-9: 21

## 2021-10-06 ENCOUNTER — OFFICE VISIT (OUTPATIENT)
Dept: FAMILY MEDICINE | Facility: CLINIC | Age: 26
End: 2021-10-06
Payer: COMMERCIAL

## 2021-10-06 VITALS
TEMPERATURE: 97.6 F | HEIGHT: 69 IN | BODY MASS INDEX: 23.9 KG/M2 | DIASTOLIC BLOOD PRESSURE: 72 MMHG | SYSTOLIC BLOOD PRESSURE: 112 MMHG | RESPIRATION RATE: 16 BRPM | HEART RATE: 76 BPM

## 2021-10-06 DIAGNOSIS — F32.1 MODERATE MAJOR DEPRESSION (H): ICD-10-CM

## 2021-10-06 DIAGNOSIS — Z00.00 HEALTHCARE MAINTENANCE: Primary | ICD-10-CM

## 2021-10-06 DIAGNOSIS — F41.9 ANXIETY: ICD-10-CM

## 2021-10-06 DIAGNOSIS — L60.0 INGROWN TOENAIL: ICD-10-CM

## 2021-10-06 PROCEDURE — 99213 OFFICE O/P EST LOW 20 MIN: CPT | Mod: 25 | Performed by: FAMILY MEDICINE

## 2021-10-06 PROCEDURE — G0145 SCR C/V CYTO,THINLAYER,RESCR: HCPCS | Performed by: FAMILY MEDICINE

## 2021-10-06 PROCEDURE — 99395 PREV VISIT EST AGE 18-39: CPT | Performed by: FAMILY MEDICINE

## 2021-10-06 RX ORDER — CEPHALEXIN 500 MG/1
500 CAPSULE ORAL 2 TIMES DAILY
Qty: 14 CAPSULE | Refills: 0 | Status: SHIPPED | OUTPATIENT
Start: 2021-10-06 | End: 2022-03-09

## 2021-10-06 NOTE — PROGRESS NOTES
Answers for HPI/ROS submitted by the patient on 10/3/2021  If you checked off any problems, how difficult have these problems made it for you to do your work, take care of things at home, or get along with other people?: Extremely difficult  PHQ9 TOTAL SCORE: 21  Frequency of exercise:: 4-5 days/week  Getting at least 3 servings of Calcium per day:: Yes  Diet:: Regular (no restrictions)  Taking medications regularly:: No  Medication side effects:: Lightheadedness  Bi-annual eye exam:: Yes  Dental care twice a year:: Yes  Sleep apnea or symptoms of sleep apnea:: Daytime drowsiness  abdominal pain: Yes  Blood in stool: No  Blood in urine: No  chest pain: No  chills: No  congestion: No  constipation: No  cough: No  diarrhea: No  dizziness: Yes  ear pain: Yes  eye pain: No  nervous/anxious: Yes  fever: No  frequency: No  genital sores: No  headaches: Yes  hearing loss: No  heartburn: Yes  arthralgias: Yes  joint swelling: No  peripheral edema: No  mood changes: No  myalgias: Yes  nausea: No  dysuria: No  palpitations: No  Skin sensation changes: No  sore throat: No  urgency: No  rash: No  shortness of breath: No  visual disturbance: No  weakness: No  pelvic pain: No  vaginal bleeding: Yes  vaginal discharge: No  tenderness: No  breast mass: No  breast discharge: No  Additional concerns today:: Yes  Duration of exercise:: 15-30 minutes        Assessment/Plan:     Health maintenance female exam.  All questions answered.  Await pap smear results -patient is having bleeding today and this may skew her Pap smear results.  Will await to see what the results look like..  Breast self exam technique reviewed and patient encouraged to perform self-exam monthly.  Discussed healthy lifestyle modifications.    Healthcare maintenance  - Pap screen reflex to HPV if ASCUS - recommend age 25 - 29    Ingrown toenail  Discussed risk and benefits of medication.  Mild rash with amoxicillin.  Keflex sent to the pharmacy.  Discussed soaking  her foot and continuing the topical antibiotic.  - cephALEXin (KEFLEX) 500 MG capsule  Dispense: 14 capsule; Refill: 0    Anxiety    Moderate major depression (H)  Follows with psychiatry.  He is using ketamine.  Plan substantial start using her Remeron at night..        Patient has been advised of split billing requirements and indicates understanding: Yes      Subjective:     Britney Singh is a 25 year old female who presents for an annual exam.  She is overall doing okay.    She has unfortunately been having a lot of issues with depression recently.  Please see previous note for further details.  Has been on many antidepressants and antipsychotics in the past.  We recently started some Remeron at night which she was taking to help her sleep.  She finally was able to see psychiatrist who started her on ketamine to be taken once every 3 days.  She is unsure how she feels about the medication but is happy to have something new to try.    She notes that she sometimes does have difficulty sleeping and will likely start taking her Remeron again.  She will discuss this with her psychiatrist as well.    Additionally, she notes that she has pain on her left great toe.  She states that it is better than a few days ago but still fairly painful.  She feels as though she has an ingrown toenail.  She has been using antibiotic ointment and bandages to the area.  There is some drainage especially in the morning.      Healthy Habits:   Regular Exercise: Yes  Sunscreen Use: Yes  Healthy Diet: yes  Dental Visits Regularly: yes  Seat Belt: Yes  Self Breast Exam Monthly: yes  Prevention of Osteoporosis: yes    Immunization History   Administered Date(s) Administered     COVID-19,PF,Pfizer 01/21/2021, 02/11/2021     DTAP (<7y) 02/15/1994, 04/15/1994, 06/27/1994, 06/15/1995, 05/19/1999     Flu, Unspecified 10/04/2017, 10/01/2018, 09/04/2019     HEPA 11/20/2007, 08/12/2010     HPV 11/20/2007, 03/26/2008, 07/22/2008     HepB  02/15/1994, 1994, 10/13/1994, 2011     Influenza (IIV3) PF 1993, 2005, 2005, 2006, 2007, 2009, 2010, 10/14/2013, 10/01/2014, 2015     Influenza Vaccine IM > 6 months Valent IIV4 (Alfuria,Fluzone) 2016, 2020, 2021     MMR 1995, 1999     Mantoux Tuberculin Skin Test 2016     Meningococcal (Menomune ) 2005     Pneumococcal 23 valent 10/14/2013     Poliovirus, inactivated (IPV) 02/15/1994, 04/15/1994, 1994, 06/15/1995     Tdap (Adacel,Boostrix) 2005, 2015         Gynecologic History  Patient's last menstrual period was 10/02/2021.  Contraception: oral contraceptive - progesterone only  Last Pap: 2018. Results were: normal      OB History    Para Term  AB Living   1 1 0 0 0 1   SAB TAB Ectopic Multiple Live Births   0 0 0 0 0      # Outcome Date GA Lbr Fredy/2nd Weight Sex Delivery Anes PTL Lv   1 Para               Obstetric Comments   Pain with menses   But cycles are normal       Current Outpatient Medications   Medication Sig Dispense Refill     albuterol (PROAIR HFA/PROVENTIL HFA/VENTOLIN HFA) 108 (90 Base) MCG/ACT inhaler Inhale 2 puffs into the lungs every 6 hours as needed       cephALEXin (KEFLEX) 500 MG capsule Take 1 capsule (500 mg) by mouth 2 times daily 14 capsule 0     clonazePAM (KLONOPIN) 0.5 MG tablet TAKE 1 TABLET (0.5 MG) BY MOUTH 2 TIMES DAILY AS NEEDED FOR ANXIETY 30 tablet 0     DEBLITANE 0.35 MG tablet Take 0.35 mg by mouth daily       Diaphragm Arc-Spring (CAYA) DPRH Place 1 Units vaginally daily as needed 1 each 1     hydrOXYzine (ATARAX) 25 MG tablet TAKE 1 TABLET BY MOUTH THREE TIMES DAILY AS NEEDED FOR ANXIETY 30 tablet 1     ketamine 5 mg TROC oral manoj Take 200 mg by mouth every 3 days       metroNIDAZOLE (METROGEL) 0.75 % vaginal gel 1 applicator vaginally daily for 7 days and then twice weekly for 3 months 140 g 1     mirtazapine (REMERON) 15 MG tablet Take  1 tablet (15 mg) by mouth At Bedtime 30 tablet 3     ondansetron (ZOFRAN) 4 MG tablet Take 1 tablet (4 mg) by mouth 3 times daily as needed 30 tablet 1     prazosin (MINIPRESS) 1 MG capsule Take 1-2 capsules (1-2 mg) by mouth nightly as needed 60 capsule 4     SUMAtriptan (IMITREX) 50 MG tablet Take 1 tablet (50 mg) by mouth at onset of headache for migraine May repeat in 2 hours. Max 4 tablets/24 hours. 10 tablet 3     Past Medical History:   Diagnosis Date     Anorexia      Asthma      Asthma      Bipolar 1 disorder (H)      Borderline personality disorder (H)      Depression      Depressive disorder      Eating disorder      PTSD (post-traumatic stress disorder)      Past Surgical History:   Procedure Laterality Date     WISDOM TOOTH EXTRACTION       Amoxicillin  Family History   Problem Relation Age of Onset     Alcohol/Drug Father      Alzheimer Disease Maternal Grandmother         dementia     Cerebrovascular Disease Maternal Grandfather      Breast Cancer Paternal Grandmother         dx age 70     Asthma Brother      Family History Negative Other      Diabetes Type 2  Father      Hypertension Father      Hemochromatosis Father      Heart Disease Maternal Grandmother      Mental Illness Mother      Thyroid Disease Mother      Skin Cancer Maternal Grandfather      Social History     Socioeconomic History     Marital status:      Spouse name: Not on file     Number of children: Not on file     Years of education: Not on file     Highest education level: Not on file   Occupational History     Not on file   Tobacco Use     Smoking status: Former Smoker     Quit date: 2014     Years since quittin.7     Smokeless tobacco: Never Used   Substance and Sexual Activity     Alcohol use: Not on file     Comment: has in the past     Drug use: Not on file     Comment: has in the past     Sexual activity: Yes     Partners: Female     Birth control/protection: Pill   Other Topics Concern     Parent/sibling  "w/ CABG, MI or angioplasty before 65F 55M? No   Social History Narrative     Not on file     Social Determinants of Health     Financial Resource Strain:      Difficulty of Paying Living Expenses:    Food Insecurity:      Worried About Running Out of Food in the Last Year:      Ran Out of Food in the Last Year:    Transportation Needs:      Lack of Transportation (Medical):      Lack of Transportation (Non-Medical):    Physical Activity:      Days of Exercise per Week:      Minutes of Exercise per Session:    Stress:      Feeling of Stress :    Social Connections:      Frequency of Communication with Friends and Family:      Frequency of Social Gatherings with Friends and Family:      Attends [employer] Services:      Active Member of Clubs or Organizations:      Attends Club or Organization Meetings:      Marital Status:    Intimate Partner Violence:      Fear of Current or Ex-Partner:      Emotionally Abused:      Physically Abused:      Sexually Abused:        Review of Systems  12 point review of systems was completed and found to be negative except for what is been stated above.      Objective:      Vitals:    10/06/21 1108   BP: 112/72   Pulse: 76   Resp: 16   Temp: 97.6  F (36.4  C)   TempSrc: Tympanic   Height: 1.759 m (5' 9.25\")         Physical Exam:  General Appearance: Alert, cooperative, no distress, appears stated age   Head: Normocephalic, without obvious abnormality, atraumatic  Eyes: PERRL, conjunctiva/corneas clear, EOM's intact   Ears: Normal TM's and external ear canals, both ears  Neck: Supple, symmetrical, trachea midline, no adenopathy;  thyroid: not enlarged, symmetric, no tenderness/mass/nodules  Back: Symmetric, no curvature, ROM normal,  Lungs: Clear to auscultation bilaterally, respirations unlabored  Breasts: No breast masses, tenderness, asymmetry, or nipple discharge.  Heart: Regular rate and rhythm, S1 and S2 normal, no murmur, rub, or gallop  Abdomen: Soft, non-tender, bowel sounds " active all four quadrants,  no masses, no organomegaly  Pelvic:normal external female genitalia, normal appearing vaginal mucosa and cervix  Extremities: Extremities normal, atraumatic, no cyanosis or edema  Skin: Skin color, texture, turgor normal, no rashes or lesions, external aspect of left great toenail by nailbed is erythematous and tender  Lymph nodes: Cervical, supraclavicular, and axillary nodes normal and   Neurologic: Normal

## 2021-10-08 ENCOUNTER — HOSPITAL ENCOUNTER (OUTPATIENT)
Dept: PHYSICAL THERAPY | Facility: CLINIC | Age: 26
Setting detail: THERAPIES SERIES
End: 2021-10-08
Attending: FAMILY MEDICINE
Payer: COMMERCIAL

## 2021-10-08 LAB
BKR LAB AP GYN ADEQUACY: NORMAL
BKR LAB AP GYN INTERPRETATION: NORMAL
BKR LAB AP HPV REFLEX: NORMAL
BKR LAB AP LMP: NORMAL
BKR LAB AP PREVIOUS ABNORMAL: NORMAL
PATH REPORT.COMMENTS IMP SPEC: NORMAL
PATH REPORT.RELEVANT HX SPEC: NORMAL

## 2021-10-08 PROCEDURE — 97110 THERAPEUTIC EXERCISES: CPT | Mod: GP

## 2021-10-08 PROCEDURE — 97140 MANUAL THERAPY 1/> REGIONS: CPT | Mod: GP

## 2021-10-16 ENCOUNTER — TRANSFERRED RECORDS (OUTPATIENT)
Dept: HEALTH INFORMATION MANAGEMENT | Facility: CLINIC | Age: 26
End: 2021-10-16

## 2021-10-16 ENCOUNTER — E-VISIT (OUTPATIENT)
Dept: URGENT CARE | Facility: URGENT CARE | Age: 26
End: 2021-10-16
Payer: COMMERCIAL

## 2021-10-16 DIAGNOSIS — R19.7 DIARRHEA, UNSPECIFIED TYPE: Primary | ICD-10-CM

## 2021-10-16 PROCEDURE — 99207 PR NON-BILLABLE SERV PER CHARTING: CPT | Performed by: FAMILY MEDICINE

## 2021-10-16 NOTE — PATIENT INSTRUCTIONS
Dear Britney Singh,    We are sorry you are not feeling well. Based on the responses you provided, it is recommended that you be seen in-person in urgent care so we can better evaluate your symptoms. Please click here to find the nearest urgent care location to you.   You will not be charged for this Visit. Thank you for trusting us with your care.    Quin Smith MD      Diarrhea with Uncertain Cause (Adult)    Diarrhea is when stools are loose and watery. This can be caused by:    Viral infections    Bacterial infections    Food poisoning    Parasites    Irritable bowel syndrome (IBS)    Inflammatory bowel diseases such as ulcerative colitis, Crohn's disease, and celiac disease    Food intolerance, such as to lactose, the sugar found in milk and milk products    Reaction to medicines like antibiotics, laxatives, cancer drugs, and antacids  Along with diarrhea, you may also have:    Abdominal pain and cramping    Nausea and vomiting    Loss of bowel control    Fever and chills    Bloody stools  In some cases, antibiotics may help to treat diarrhea. You may have a stool sample test. This is done to see what is causing your diarrhea, and if antibiotics will help treat it. The results of a stool sample test may take up to 2 days. The healthcare provider may not give you antibiotics until he or she has the stool test results.  Diarrhea can cause dehydration. This is the loss of too much water and other fluids from the body. When this occurs, body fluid must be replaced. This can be done with oral rehydration solutions. Oral rehydration solutions are available at drugstores and grocery stores without a prescription. Sports drinks are not the best choice if you are very dehydrated. They have too much sugar and not enough electrolytes.  Home care  Follow all instructions given by your healthcare provider. Rest at home for the next 24 hours, or until you feel better. Avoid caffeine, tobacco, and alcohol. These  can make diarrhea, cramping, and pain worse.  If taking medicines:    Over-the-counter nausea and diarrhea medicines are generally OK unless you experience fever or blood stool. Check with your doctor first in those circumstances.    You may use acetaminophen or NSAID medicines like ibuprofen or naproxen to reduce pain and fever. Don t use these if you have chronic liver or kidney disease, or ever had a stomach ulcer or gastrointestinal bleeding. Don't use NSAID medicines if you are already taking one for another condition (like arthritis) or are on daily aspirin therapy (such as for heart disease or after a stroke). Talk with your healthcare provider first.    If antibiotics were prescribed, be sure you take them until they are finished. Don t stop taking them even when you feel better. Antibiotics must be taken as a full course.  To prevent the spread of illness:    Remember that washing with soap and water and using alcohol-based  is the best way to prevent the spread of infection. Dry your hands with a single use towel (like a paper towel).    Clean the toilet after each use.    Wash your hands before eating.    Wash your hands before and after preparing food. Keep in mind that people with diarrhea or vomiting should not prepare food for others.    Wash your hands after using cutting boards, countertops, and knives that have been in contact with raw foods.    Wash and then peel fruits and vegetables.    Keep uncooked meats away from cooked and ready-to-eat foods.    Use a food thermometer when cooking. Cook poultry to at least 165 F (74 C). Cook ground meat (beef, veal, pork, lamb) to at least 160 F (71 C). Cook fresh beef, veal, lamb, and pork to at least 145 F (63 C).    Don t eat raw or undercooked eggs (poached or shannon side up), poultry, meat, or unpasteurized milk and juices.  Food and drinks  The main goal while treating vomiting or diarrhea is to prevent dehydration. This is done by taking small  amounts of liquids often.    Keep in mind that liquids are more important than food right now.    Drink only small amounts of liquids at a time.    Don t force yourself to eat, especially if you are having cramping, vomiting, or diarrhea. Don t eat large amounts at a time, even if you are hungry.    If you eat, avoid fatty, greasy, spicy, or fried foods.    Don t eat dairy foods or drink milk if you have diarrhea. These can make diarrhea worse.  During the first 24 hours you can try:    Oral rehydration solutions.  Sports drinks may be used if you are not too dehydrated and are otherwise healthy.    Soft drinks without caffeine    Ginger ale    Water (plain or flavored)    Decaf tea or coffee    Clear broth, consommé, or bouillon    Gelatin, popsicles, or frozen fruit juice bars  The second 24 hours, if you are feeling better, you can add:    Hot cereal, plain toast, bread, rolls, or crackers    Plain noodles, rice, mashed potatoes, chicken noodle soup, or rice soup    Unsweetened canned fruit (no pineapple)    Bananas  As you recover:    Limit fat intake to less than 15 grams per day. Don t eat margarine, butter, oils, mayonnaise, sauces, gravies, fried foods, peanut butter, meat, poultry, or fish.    Limit fiber. Don t eat raw or cooked vegetables, fresh fruits except bananas, or bran cereals.    Limit caffeine and chocolate.    Limit dairy.    Don t use spices or seasonings except salt.    Go back to your normal diet over time, as you feel better and your symptoms improve.    If the symptoms come back, go back to a simple diet or clear liquids.  Follow-up care  Follow up with your healthcare provider, or as advised. If a stool sample was taken or cultures were done, call the healthcare provider for the results as instructed.  Call 911  Call 911 if you have any of these symptoms:    Trouble breathing    Confusion    Extreme drowsiness or trouble walking    Loss of consciousness    Rapid heart rate    Chest  pain    Stiff neck    Seizure  When to seek medical advice  Call your healthcare provider right away if any of these occur:    Abdominal pain that gets worse    Constant lower right abdominal pain    Continued vomiting and inability to keep liquids down    Diarrhea more than 5 times a day    Blood in vomit or stool    Dark urine or no urine for 8 hours, dry mouth and tongue, tiredness, weakness, or dizziness    Drowsiness    New rash    You don t get better in 2 to 3 days    Fever of 100.4 F (38 C) or higher, or as directed by your healthcare provider  Cecily last reviewed this educational content on 6/1/2018 2000-2021 [employer]. All rights reserved. This information is not intended as a substitute for professional medical care. Always follow your healthcare professional's instructions.

## 2021-10-18 ENCOUNTER — TELEPHONE (OUTPATIENT)
Dept: FAMILY MEDICINE | Facility: CLINIC | Age: 26
End: 2021-10-18

## 2021-10-18 NOTE — TELEPHONE ENCOUNTER
The patient no longer takes this medication as it caused her to have suicidal ideations.  She states that she has called the pharmacy several times to get this cancel off of her medication list.    EB

## 2021-10-18 NOTE — TELEPHONE ENCOUNTER
Pharmacy requesting:    Bupropion 150mg ER Tab  Qty: 30  Sig: Take 1 tablet by mouth every morning.    MICHAEL BEAVERS

## 2021-10-19 PROBLEM — F32.9 MAJOR DEPRESSION: Status: ACTIVE | Noted: 2021-02-03

## 2021-10-22 ENCOUNTER — HOSPITAL ENCOUNTER (OUTPATIENT)
Dept: PHYSICAL THERAPY | Facility: CLINIC | Age: 26
Setting detail: THERAPIES SERIES
End: 2021-10-22
Attending: FAMILY MEDICINE
Payer: COMMERCIAL

## 2021-10-22 ENCOUNTER — LAB (OUTPATIENT)
Dept: LAB | Facility: CLINIC | Age: 26
End: 2021-10-22
Payer: COMMERCIAL

## 2021-10-22 DIAGNOSIS — R79.89 ELEVATED LFTS: ICD-10-CM

## 2021-10-22 LAB
ALBUMIN SERPL-MCNC: 3.7 G/DL (ref 3.4–5)
ALP SERPL-CCNC: 64 U/L (ref 40–150)
ALT SERPL W P-5'-P-CCNC: 86 U/L (ref 0–50)
ANION GAP SERPL CALCULATED.3IONS-SCNC: 3 MMOL/L (ref 3–14)
AST SERPL W P-5'-P-CCNC: 41 U/L (ref 0–45)
BILIRUB SERPL-MCNC: 0.4 MG/DL (ref 0.2–1.3)
BUN SERPL-MCNC: 11 MG/DL (ref 7–30)
CALCIUM SERPL-MCNC: 9 MG/DL (ref 8.5–10.1)
CHLORIDE BLD-SCNC: 108 MMOL/L (ref 94–109)
CO2 SERPL-SCNC: 28 MMOL/L (ref 20–32)
CREAT SERPL-MCNC: 0.64 MG/DL (ref 0.52–1.04)
GFR SERPL CREATININE-BSD FRML MDRD: >90 ML/MIN/1.73M2
GLUCOSE BLD-MCNC: 88 MG/DL (ref 70–99)
POTASSIUM BLD-SCNC: 3.9 MMOL/L (ref 3.4–5.3)
PROT SERPL-MCNC: 7.6 G/DL (ref 6.8–8.8)
SODIUM SERPL-SCNC: 139 MMOL/L (ref 133–144)

## 2021-10-22 PROCEDURE — 36415 COLL VENOUS BLD VENIPUNCTURE: CPT

## 2021-10-22 PROCEDURE — 97110 THERAPEUTIC EXERCISES: CPT | Mod: GP

## 2021-10-22 PROCEDURE — 97140 MANUAL THERAPY 1/> REGIONS: CPT | Mod: GP

## 2021-10-22 PROCEDURE — 80053 COMPREHEN METABOLIC PANEL: CPT

## 2021-10-24 DIAGNOSIS — F41.9 ANXIETY: ICD-10-CM

## 2021-10-25 RX ORDER — HYDROXYZINE HYDROCHLORIDE 25 MG/1
TABLET, FILM COATED ORAL
Qty: 30 TABLET | Refills: 0 | Status: SHIPPED | OUTPATIENT
Start: 2021-10-25 | End: 2021-11-19

## 2021-10-25 RX ORDER — CLONAZEPAM 0.5 MG/1
0.5 TABLET ORAL 2 TIMES DAILY PRN
Qty: 30 TABLET | Refills: 0 | Status: SHIPPED | OUTPATIENT
Start: 2021-10-25 | End: 2021-12-30

## 2021-10-25 NOTE — TELEPHONE ENCOUNTER
Routing refill requests to Provider because they are not on the RN refill protocol.  Thank you.  Suresh Ortiz, RN

## 2021-11-03 ENCOUNTER — HOSPITAL ENCOUNTER (OUTPATIENT)
Dept: PHYSICAL THERAPY | Facility: CLINIC | Age: 26
Setting detail: THERAPIES SERIES
End: 2021-11-03
Attending: FAMILY MEDICINE
Payer: COMMERCIAL

## 2021-11-03 PROCEDURE — 97110 THERAPEUTIC EXERCISES: CPT | Mod: GP | Performed by: PHYSICAL THERAPIST

## 2021-11-03 PROCEDURE — 97140 MANUAL THERAPY 1/> REGIONS: CPT | Mod: GP | Performed by: PHYSICAL THERAPIST

## 2021-11-12 DIAGNOSIS — R79.89 ELEVATED LFTS: Primary | ICD-10-CM

## 2021-11-16 ENCOUNTER — HOSPITAL ENCOUNTER (OUTPATIENT)
Dept: PHYSICAL THERAPY | Facility: CLINIC | Age: 26
Setting detail: THERAPIES SERIES
End: 2021-11-16
Attending: FAMILY MEDICINE
Payer: COMMERCIAL

## 2021-11-16 PROCEDURE — 97110 THERAPEUTIC EXERCISES: CPT | Mod: GP | Performed by: PHYSICAL THERAPIST

## 2021-11-16 PROCEDURE — 97140 MANUAL THERAPY 1/> REGIONS: CPT | Mod: GP | Performed by: PHYSICAL THERAPIST

## 2021-11-18 ENCOUNTER — LAB (OUTPATIENT)
Dept: LAB | Facility: CLINIC | Age: 26
End: 2021-11-18
Payer: COMMERCIAL

## 2021-11-18 DIAGNOSIS — R79.89 ELEVATED LFTS: ICD-10-CM

## 2021-11-18 LAB
ALBUMIN SERPL-MCNC: 3.6 G/DL (ref 3.4–5)
ALP SERPL-CCNC: 65 U/L (ref 40–150)
ALT SERPL W P-5'-P-CCNC: 84 U/L (ref 0–50)
AST SERPL W P-5'-P-CCNC: 40 U/L (ref 0–45)
BILIRUB DIRECT SERPL-MCNC: <0.1 MG/DL (ref 0–0.2)
BILIRUB SERPL-MCNC: 0.4 MG/DL (ref 0.2–1.3)
PROT SERPL-MCNC: 7.5 G/DL (ref 6.8–8.8)

## 2021-11-18 PROCEDURE — 80076 HEPATIC FUNCTION PANEL: CPT

## 2021-11-18 PROCEDURE — 36415 COLL VENOUS BLD VENIPUNCTURE: CPT

## 2021-11-19 DIAGNOSIS — F41.9 ANXIETY: ICD-10-CM

## 2021-11-19 RX ORDER — HYDROXYZINE HYDROCHLORIDE 25 MG/1
TABLET, FILM COATED ORAL
Qty: 30 TABLET | Refills: 0 | Status: SHIPPED | OUTPATIENT
Start: 2021-11-19 | End: 2021-12-31

## 2021-11-19 NOTE — TELEPHONE ENCOUNTER
January 27, 2017        HAYDE Meléndez MD  [address] [place] [address]             [facility]  [address] [place] [address]  Phone: [phone]  Fax: [phone]   Patient: Laly Baker   MR Number: [id number]   YOB: 1939   Date of Visit: 1/27/2017       Dear Dr. Meléndez:    I examined Laly Baker today in clinic. Attached you will find relevant portions of my assessment and plan of care.    Visually significant cataracts in both eyes.    Long discussion AGAIN with patient that current vision not safe for driving.  Changing glasses and contact lens Rx will not improve vision to adequate levels for safe driving  Strongly advised to d/c driving until consult for CE  Pt refuses to schedule consult stating she has other health concerns that are more pressing.    If you have questions, please do not hesitate to call me. I look forward to following Laly Baker along with you.    Sincerely,      SHIRA Murillo, OD            CC  No Recipients    Enclosure          Routing refill request to provider for review/approval because:  PHQ9: 21 on 10/2021    Maximino Clemente RN

## 2021-11-30 ENCOUNTER — HOSPITAL ENCOUNTER (OUTPATIENT)
Dept: PHYSICAL THERAPY | Facility: CLINIC | Age: 26
Setting detail: THERAPIES SERIES
End: 2021-11-30
Attending: FAMILY MEDICINE
Payer: COMMERCIAL

## 2021-11-30 PROCEDURE — 97110 THERAPEUTIC EXERCISES: CPT | Mod: GP | Performed by: PHYSICAL THERAPIST

## 2021-11-30 PROCEDURE — 97140 MANUAL THERAPY 1/> REGIONS: CPT | Mod: GP | Performed by: PHYSICAL THERAPIST

## 2021-12-14 ENCOUNTER — HOSPITAL ENCOUNTER (OUTPATIENT)
Dept: PHYSICAL THERAPY | Facility: CLINIC | Age: 26
Setting detail: THERAPIES SERIES
End: 2021-12-14
Attending: FAMILY MEDICINE
Payer: COMMERCIAL

## 2021-12-14 PROCEDURE — 90912 BFB TRAINING 1ST 15 MIN: CPT | Mod: GP | Performed by: PHYSICAL THERAPIST

## 2021-12-14 PROCEDURE — 97140 MANUAL THERAPY 1/> REGIONS: CPT | Mod: GP | Performed by: PHYSICAL THERAPIST

## 2021-12-14 PROCEDURE — 97110 THERAPEUTIC EXERCISES: CPT | Mod: GP | Performed by: PHYSICAL THERAPIST

## 2021-12-14 NOTE — PROGRESS NOTES
"Physical Therapy Progress Note and Medicare RECERTIFICATION    Britney Singh  1995    Session Number: 9/12 since start of care.    Reasons for Continuing Treatment:   Pt continues to have (B) hip pain and tailbone pain, that she feels maybe related to her PTSD.  States the MT and taping seem to help for a bit of time and exercises/stretching are helpful to relieve pain \"sometimes.\" Pt could benefit from skilled PT to improve PFM control and develop a healthy exercise routine to relieve pain.     Frequency/Duration  1 times per every other week for 8 weeks for a total of 4 visits.    Medical Diagnosis: Female Stress Incontinence    Onset Date:8/24/21    Start of Care Date: 9/2/21    Recertification Period  11/25/21 - 1/20/22    Physician Signature:    Date:    X_______________________________________________________    Physician Name: Anushka Arnold MD    I certify the need for these services furnished under this plan of treatment and while under my care. Physician co-signature of this document indicates review and certification of the therapy plan.  This signature may be written on paper, or electronically signed within EP[employer].          11/30/21 1000   Signing Clinician's Name / Credentials   Signing clinician's name / credentials Silvia Yo, PT MA #[id number]   Session Number   Session Number 9/12   Progress Note/Recertification   Progress Note Due Date 11/25/21   Progress Note Completed Date 11/30/21   Recertification Due Date 11/25/21   Ortho Goal 1   Goal Identifier STG 1   Goal Description Pt will report hip pain no greater than 6/10   Goal Progress 10/22 6-7/10   Target Date 09/30/21   Ortho Goal 2   Goal Identifier LTG 1   Goal Description Pt will report hip pain no greater than 3/10   Target Date 12/14/21   Goal Progress Not Met: pt states pain is at 7/10 on average in the last week.   (cont for 2 more weeks. )   Ortho Goal 3   Goal Identifier STG 2   Goal Description Pt will report no urine " "leaking for 2 weeks for improved continence   Goal Progress Not Met: pt has not done jumping to try out this goal lately.   (cont for 2 more weeks)   Target Date 12/14/21   Ortho Goal 4   Goal Identifier LTG 2   Goal Description Pt will report no urine leaking for one month for improved continence   Target Date 12/14/21   Goal Progress Met: for daily activities, but not tried with Jump Rope Workout.  (Recheck in 2 weeks. )   Ortho Goal 5   Goal Identifier STG 3   Goal Description Pt will improve general LE and pelvic floor strength to at least 3+/5 for improved muscle performance and decreased pain   Target Date 10/14/21   Date Met 10/22/21   Ortho Goal 6   Goal Identifier LTG 3   Goal Description Pt will improve general LE and pelvic floor strength to at least 4/5 for improved muscle performance and decreased pain   Target Date 11/25/21   Subjective Report   Subjective Report Pt states she has had more (B) hip and tailbone soreness the last couple of days.  States the tape seems to help, but comes off only after 1.5 days.    Objective Measure 1   Objective Measure Pain    Details Hips (B) = 7/10, \"just so sore\"   Objective Measure 2   Objective Measure Pelvic Alignment   Details WNL in sagittal and transverse planes   Objective Measure 3   Objective Measure Palpation   Details Tender at lateral sacral border, piriformis and glute med (L) > (R),  tender over coccyx distally.    Therapeutic Procedure/exercise   Therapeutic Procedures: strength, endurance, ROM, flexibillity minutes (52125) 23   Skilled Intervention Exer: stretching, strengthening; progression of HEP   Patient Response Pt only able to do 3-5 sec max SL plank holds.  Doing all other exers correctly. Forgot to be doing HF stretch.    Treatment Detail Reviewed piriformis, OI and ITB stretching. Re-taught kneeling HF stretch on a pillow.  Instructed in and completed SL planks with cues to work up to 10 sec hold using abdominal engagement. Completed supine " "hooklying PPT with heel slide x 10 (B).    Manual Therapy   Manual Therapy: Mobilization, MFR, MLD, friction massage minutes (06945) 35   Skilled Intervention MT: METs, Mobs, STM, TrP release, taping for fascial release   Patient Response Pt c/o 7+/10 pain now - \"it is just a little aggravated.\"    Treatment Detail Prone for Sacral diagonal rocking, STM and TrP release to glute meds (B) and proximal pirirformis mms.Sacrotuberous releases (B), and cross fiber massage to long dorsal ligaments.  Pudendal nn glides at (B) Sacrospinous and Sacrotuberous jcts.  Taped over sacrum to relieve fascial tension at Coccyx with strapping tape in superior pull line. Instructed in wear schedule for 4-5 days as tolerated.  Sacrotuberous release (B).    Plan   Homework [id number]   Home program Added side Planks to HEP   Plan for next session Re-try relaxation efforts with BF.    Total Session Time   Timed Code Treatment Minutes 58 (2TE,2MT)   Total Treatment Time (sum of timed and untimed services) 58 (2TE,2MT)   Thank you for the referral of this patient.  Silvia Yo, PT, MA  #[id number]    "

## 2021-12-28 ENCOUNTER — MYC MEDICAL ADVICE (OUTPATIENT)
Dept: FAMILY MEDICINE | Facility: CLINIC | Age: 26
End: 2021-12-28
Payer: COMMERCIAL

## 2021-12-29 ENCOUNTER — HOSPITAL ENCOUNTER (OUTPATIENT)
Dept: PHYSICAL THERAPY | Facility: CLINIC | Age: 26
Setting detail: THERAPIES SERIES
End: 2021-12-29
Attending: FAMILY MEDICINE
Payer: COMMERCIAL

## 2021-12-29 PROCEDURE — 97140 MANUAL THERAPY 1/> REGIONS: CPT | Mod: GP | Performed by: PHYSICAL THERAPIST

## 2021-12-29 PROCEDURE — 97110 THERAPEUTIC EXERCISES: CPT | Mod: GP | Performed by: PHYSICAL THERAPIST

## 2021-12-30 ENCOUNTER — MYC REFILL (OUTPATIENT)
Dept: FAMILY MEDICINE | Facility: CLINIC | Age: 26
End: 2021-12-30
Payer: COMMERCIAL

## 2021-12-30 DIAGNOSIS — F41.9 ANXIETY: ICD-10-CM

## 2021-12-30 DIAGNOSIS — G43.809 OTHER MIGRAINE WITHOUT STATUS MIGRAINOSUS, NOT INTRACTABLE: ICD-10-CM

## 2021-12-30 RX ORDER — CLONAZEPAM 0.5 MG/1
0.5 TABLET ORAL 2 TIMES DAILY PRN
Qty: 30 TABLET | Refills: 0 | Status: SHIPPED | OUTPATIENT
Start: 2021-12-30 | End: 2022-05-31

## 2021-12-31 RX ORDER — SUMATRIPTAN 50 MG/1
50 TABLET, FILM COATED ORAL
Qty: 10 TABLET | Refills: 0 | Status: SHIPPED | OUTPATIENT
Start: 2021-12-31 | End: 2022-01-27

## 2021-12-31 RX ORDER — HYDROXYZINE HYDROCHLORIDE 25 MG/1
TABLET, FILM COATED ORAL
Qty: 30 TABLET | Refills: 0 | Status: SHIPPED | OUTPATIENT
Start: 2021-12-31 | End: 2022-01-27

## 2022-01-04 ENCOUNTER — OFFICE VISIT (OUTPATIENT)
Dept: FAMILY MEDICINE | Facility: CLINIC | Age: 27
End: 2022-01-04
Payer: COMMERCIAL

## 2022-01-04 VITALS
TEMPERATURE: 98.4 F | SYSTOLIC BLOOD PRESSURE: 118 MMHG | DIASTOLIC BLOOD PRESSURE: 64 MMHG | HEART RATE: 80 BPM | BODY MASS INDEX: 24.44 KG/M2 | HEIGHT: 69 IN | OXYGEN SATURATION: 98 % | WEIGHT: 165 LBS

## 2022-01-04 DIAGNOSIS — R79.89 ELEVATED LFTS: ICD-10-CM

## 2022-01-04 DIAGNOSIS — N64.4 BREAST PAIN: Primary | ICD-10-CM

## 2022-01-04 LAB
ALBUMIN SERPL-MCNC: 3.5 G/DL (ref 3.4–5)
ALP SERPL-CCNC: 76 U/L (ref 40–150)
ALT SERPL W P-5'-P-CCNC: 32 U/L (ref 0–50)
AST SERPL W P-5'-P-CCNC: 17 U/L (ref 0–45)
BILIRUB DIRECT SERPL-MCNC: 0.1 MG/DL (ref 0–0.2)
BILIRUB SERPL-MCNC: 0.4 MG/DL (ref 0.2–1.3)
PROT SERPL-MCNC: 7.6 G/DL (ref 6.8–8.8)

## 2022-01-04 PROCEDURE — 80076 HEPATIC FUNCTION PANEL: CPT | Performed by: FAMILY MEDICINE

## 2022-01-04 PROCEDURE — 99213 OFFICE O/P EST LOW 20 MIN: CPT | Performed by: FAMILY MEDICINE

## 2022-01-04 PROCEDURE — 36415 COLL VENOUS BLD VENIPUNCTURE: CPT | Performed by: FAMILY MEDICINE

## 2022-01-04 ASSESSMENT — MIFFLIN-ST. JEOR: SCORE: 1546.78

## 2022-01-04 NOTE — PATIENT INSTRUCTIONS
I have ordered outside imaging for you.  You can call [phone] to get this scheduled.  This is the main scheduling number and they will be able to help you schedule anywhere within the [facility] system.

## 2022-01-04 NOTE — PROGRESS NOTES
Breast Concern  Onset/Duration: x last week  Description:   Location: both breast - L pain random -bilateral pain during her periods also - R breast changes  Pain or tenderness: YES  Redness: YES - left breast  Intensity: moderate  Progression of Symptoms: same and constant  Accompanying Signs & Symptoms:  Any lumps in axillary region:   Movable: no  Nipple discharge: none  Changes in the skin or nipple: redness  discoloration  On Hormone therapy: YES  Does it change with menstrual cycle: YES  Previous history of similar problem: none  First degree relative with breast cancer: a positive family history for breast cancer in her paternal grandmother and aunt(s).  Precipitating factors:           Worsened by: unknown  Alleviating factors:            Improved by: none      Assessment/Plan:    Britney Singh is a 26 year old female presenting for:    Breast pain  Reassurance was given that no abnormalities were noted on examination.  I did offer an ultrasound to further evaluate if she would like or she could continue to monitor.  She opted to proceed with ultrasound and this was placed.  She will follow up me if she is any further questions or concerns.- US Breast Left Limited 1-3 Quadrants    Elevated LFTs  Repeat LFTs.  - Hepatic panel (Albumin, ALT, AST, Bili, Alk Phos, TP)      There are no discontinued medications.        Chief Complaint:  Breast Problem        Subjective:   Britney Singh is a very pleasant 26-year-old female presenting to the clinic today for a follow-up of lab test as well as left-sided breast pain.    Patient notes that she began having left-sided breast pain over the last few weeks.  She notes that the areas of pain change a bit but noted that when she got out of the shower a few days ago she got the left breast looked a little bruised/different.    No overlying skin changes.  No nipple discharge.  No family history of breast cancer.    She also needs her LFTs rechecked.  These were  elevated when she was ill and have been slowly trending towards normal.    12 point review of systems completed and negative except for what has been described above    History   Smoking Status     Former Smoker     Quit date: 1/7/2014   Smokeless Tobacco     Never Used         Current Outpatient Medications:      albuterol (PROAIR HFA/PROVENTIL HFA/VENTOLIN HFA) 108 (90 Base) MCG/ACT inhaler, Inhale 2 puffs into the lungs every 6 hours as needed, Disp: , Rfl:      clonazePAM (KLONOPIN) 0.5 MG tablet, Take 1 tablet (0.5 mg) by mouth 2 times daily as needed for anxiety, Disp: 30 tablet, Rfl: 0     DEBLITANE 0.35 MG tablet, Take 0.35 mg by mouth daily, Disp: , Rfl:      Diaphragm Arc-Spring (CAYA) DPRH, Place 1 Units vaginally daily as needed, Disp: 1 each, Rfl: 1     hydrOXYzine (ATARAX) 25 MG tablet, TAKE 1 TABLET BY MOUTH THREE TIMES DAILY AS NEEDEDFOR ANXIETY, Disp: 30 tablet, Rfl: 0     ketamine 5 mg TROC oral manoj, Take 200 mg by mouth every 3 days, Disp: , Rfl:      mirtazapine (REMERON) 15 MG tablet, Take 1 tablet (15 mg) by mouth At Bedtime, Disp: 30 tablet, Rfl: 3     ondansetron (ZOFRAN) 4 MG tablet, Take 1 tablet (4 mg) by mouth 3 times daily as needed, Disp: 30 tablet, Rfl: 1     prazosin (MINIPRESS) 1 MG capsule, Take 1-2 capsules (1-2 mg) by mouth nightly as needed, Disp: 60 capsule, Rfl: 4     SUMAtriptan (IMITREX) 50 MG tablet, TAKE 1 TABLET (50 MG) BY MOUTH AT ONSET OF HEADACHE FOR MIGRAINE MAY REPEAT IN 2 HOURS. MAX 4 TABLETS/24 HOURS., Disp: 10 tablet, Rfl: 0     cephALEXin (KEFLEX) 500 MG capsule, Take 1 capsule (500 mg) by mouth 2 times daily (Patient not taking: Reported on 1/4/2022), Disp: 14 capsule, Rfl: 0     metroNIDAZOLE (METROGEL) 0.75 % vaginal gel, 1 applicator vaginally daily for 7 days and then twice weekly for 3 months (Patient not taking: Reported on 1/4/2022), Disp: 140 g, Rfl: 1      Objective:  Vitals:    01/04/22 1149   BP: 118/64   BP Location: Right arm   Patient Position:  "Sitting   Cuff Size: Adult Large   Pulse: 80   Temp: 98.4  F (36.9  C)   TempSrc: Tympanic   SpO2: 98%   Weight: 74.8 kg (165 lb)   Height: 1.759 m (5' 9.25\")       Body mass index is 24.19 kg/m .    Vital signs reviewed and stable  General: No acute distress  Psych: Appropriate affect  HEENT: moist mucous membranes  Lymph: no cervical or supraclavicular lymphadenopathy  Cardiovascular: regular rate and rhythm with no murmur  Pulmonary: clear to auscultation bilaterally with no wheeze  Integument: Breast examination is normal with no masses or skin changes.  Some mild tenderness to palpation in the left breast in the 3 o'clock position  Extremities: warm and well perfused with no edema  Skin: warm and dry with no rash         This note has been dictated and transcribed using voice recognition software.   Any errors in transcription are unintentional and inherent to the software.    "

## 2022-01-25 ENCOUNTER — HOSPITAL ENCOUNTER (OUTPATIENT)
Dept: PHYSICAL THERAPY | Facility: CLINIC | Age: 27
Setting detail: THERAPIES SERIES
End: 2022-01-25
Attending: FAMILY MEDICINE
Payer: COMMERCIAL

## 2022-01-25 PROCEDURE — 97110 THERAPEUTIC EXERCISES: CPT | Mod: GP | Performed by: PHYSICAL THERAPIST

## 2022-01-25 PROCEDURE — 97140 MANUAL THERAPY 1/> REGIONS: CPT | Mod: GP | Performed by: PHYSICAL THERAPIST

## 2022-01-25 NOTE — PROGRESS NOTES
Physical Therapy Progress Note and Medicare RECERTIFICATION    Britney Singh  1995    Session Number: 12/12 since start of care.    Reasons for Continuing Treatment:   Pt continues to have pain and tenderness in gluteals, LB and coccyx.  Pt does better after PT MT sessions, but does not get the longlasting relief she needs.  Pt could benefit from skilled PT to improve lumbosacral mobility and to advance HEP to learn techniques to allow self-control of pain.     Frequency/Duration  1 times per every 2 weeks for 6 weeks for a total of 3 visits.    Medical Diagnosis: Pelvic Floor Muscle Dysfunction; LBP, Coccydynia    Onset Date:8/24/21    Start of Care Date: 9/2/21    Recertification Period  1/20/22 - 3/13/22    Physician Signature:    Date:    X_______________________________________________________    Physician Name: Anushka Arnold MD    I certify the need for these services furnished under this plan of treatment and while under my care. Physician co-signature of this document indicates review and certification of the therapy plan.  This signature may be written on paper, or electronically signed within EPIC.            01/25/22 1400   Signing Clinician's Name / Credentials   Signing clinician's name / credentials Silvia Yo, PT MA #[id number]   Session Number   Session Number 12/12   Progress Note/Recertification   Progress Note Due Date 01/20/22   Recertification Due Date 01/20/22   Ortho Goal 1   Goal Identifier STG 1   Goal Description Pt will report hip pain no greater than 6/10   Goal Progress 10/22 6-7/10   Target Date 09/30/21   Ortho Goal 2   Goal Identifier LTG 1   Goal Description Pt will report hip pain no greater than 3/10   Goal Progress Not Met: pt states pain is at 8/10 on average in the last week.   (cont for 2 more weeks. )   Target Date 12/14/21   Ortho Goal 3   Goal Identifier STG 2   Goal Description Pt will report no urine leaking for 2 weeks for improved continence   Goal Progress  "Not Met: pt has not done jumping to try out this goal lately.   (cont for 2 more weeks)   Target Date 12/14/21   Ortho Goal 4   Goal Identifier LTG 2   Goal Description Pt will report no urine leaking for one month for improved continence   Goal Progress Met: for daily activities, but not tried with Jump Rope Workout.  (Recheck in 2 weeks. )   Target Date 12/14/21   Ortho Goal 5   Goal Identifier STG 3   Goal Description Pt will improve general LE and pelvic floor strength to at least 3+/5 for improved muscle performance and decreased pain   Target Date 10/14/21   Date Met 10/22/21   Ortho Goal 6   Goal Identifier LTG 3   Goal Description Pt will improve general LE and pelvic floor strength to at least 4/5 for improved muscle performance and decreased pain   Target Date 11/25/21   Subjective Report   Subjective Report Pain in lumbopelvic jct and into (B).  Sitting still in general is hard, \"I have to keep moving.\" Tailbone still painful sitting on firm surface.  Moves often to try to reduce stress to any one body part.  States no issues with leaking urine and no c/o with BM's.     Objective Measure 1   Objective Measure Pain   Details Points to SIJ and lower lumbar (B); 7/10 everyday;    Objective Measure 2   Objective Measure Pelvic Alignment   Details Posterior rotated (R) ilium, WNL in transverse plane   Objective Measure 3   Objective Measure Flexibility   Details HS (B) = 80*; Improved flexibility at the piriformis (B) with ability to cross midline without \"much\" pull.    Objective Measure 4   Objective Measure Palpation   Details Tenderness (B) glute med and piriformis mms.  Tender (L) sacral base with PA pressures, tenderness (B) at sacrotuberous lig (L) > (R).  Tenderness at tailbone.    Therapeutic Procedure/exercise   Therapeutic Procedures: strength, endurance, ROM, flexibillity minutes (54380) 15   Skilled Intervention Exer: stretching, strengthening, positioning; progression of HEP   Patient Response Pt " recalls all exers confidently and is able to demo correctly on demand.    Treatment Detail Reivewed and completed supine piriformis stretch, and Supine HS stretching.  Taught ice cube massage to (B) hips/ grtr troch to reduce pain and possible bursitis.  Taught self-correction of SIJ using transfer belt in figure 8 at distal thighs and told to pull up with (R) while pushing down with (L).  Taught setting position using towel roll with 5 sec holds x 3 (isometric hip add).  Reviewed ITB stretch in SL.    Manual Therapy   Manual Therapy: Mobilization, MFR, MLD, friction massage minutes (50428) 40   Skilled Intervention MT: METS, mobs, STM, TrP release   Patient Response Pt less tender, less c/o with MT by end of session.    Treatment Detail Prone for Sacral diagonal rocking, STM and TrP release to glute meds (B) and proximal pirirformis mms.Sacrotuberous releases (B), and cross fiber massage to long dorsal ligaments.  UPAs and  through lumbar spine at L4-2 working least painful to more painful segments.  Side glides to full lumbar and Thoracic spinal segments to further release through LS jct.    Plan   Homework [id number]   Home program Cont with current HEP. Added Ice massage and self-SIJ correction.    Plan for next session See pt in 2 weeks and cont for up to 3 more sessions, to advance HEP and abiltity to self-control pain.    Total Session Time   Timed Code Treatment Minutes 55 (TE,3MT)   Total Treatment Time (sum of timed and untimed services) 55 (TE,3MT)   Thank you for the referral of this patient.  Silvia Yo, PT, MA  #[id number]

## 2022-01-26 DIAGNOSIS — G43.809 OTHER MIGRAINE WITHOUT STATUS MIGRAINOSUS, NOT INTRACTABLE: ICD-10-CM

## 2022-01-26 DIAGNOSIS — F41.9 ANXIETY: ICD-10-CM

## 2022-01-27 RX ORDER — SUMATRIPTAN 50 MG/1
50 TABLET, FILM COATED ORAL
Qty: 10 TABLET | Refills: 0 | Status: SHIPPED | OUTPATIENT
Start: 2022-01-27 | End: 2022-03-08

## 2022-01-27 RX ORDER — HYDROXYZINE HYDROCHLORIDE 25 MG/1
TABLET, FILM COATED ORAL
Qty: 30 TABLET | Refills: 0 | Status: SHIPPED | OUTPATIENT
Start: 2022-01-27 | End: 2022-03-08

## 2022-01-27 RX ORDER — PRAZOSIN HYDROCHLORIDE 1 MG/1
1-2 CAPSULE ORAL
Qty: 60 CAPSULE | Refills: 3 | Status: SHIPPED | OUTPATIENT
Start: 2022-01-27 | End: 2022-03-09

## 2022-01-27 NOTE — TELEPHONE ENCOUNTER
Routing refill request to provider for review/approval because:  Serotonin Agonist request needs review.    Please review patient's record. If patient has had 8 or more treatments in the past month, please forward to provider.  Refilled hydroxyzine 3 weeks ago  Haritha Thornton RN

## 2022-02-01 ENCOUNTER — HOSPITAL ENCOUNTER (OUTPATIENT)
Dept: ULTRASOUND IMAGING | Facility: CLINIC | Age: 27
End: 2022-02-01
Attending: FAMILY MEDICINE
Payer: COMMERCIAL

## 2022-02-01 DIAGNOSIS — N64.4 BREAST PAIN: ICD-10-CM

## 2022-02-01 PROCEDURE — 76642 ULTRASOUND BREAST LIMITED: CPT | Mod: LT

## 2022-02-08 ENCOUNTER — HOSPITAL ENCOUNTER (OUTPATIENT)
Dept: PHYSICAL THERAPY | Facility: CLINIC | Age: 27
Setting detail: THERAPIES SERIES
End: 2022-02-08
Attending: FAMILY MEDICINE
Payer: COMMERCIAL

## 2022-02-08 PROCEDURE — 97140 MANUAL THERAPY 1/> REGIONS: CPT | Mod: GP | Performed by: PHYSICAL THERAPIST

## 2022-02-08 PROCEDURE — 97110 THERAPEUTIC EXERCISES: CPT | Mod: GP | Performed by: PHYSICAL THERAPIST

## 2022-03-01 ENCOUNTER — E-VISIT (OUTPATIENT)
Dept: OBGYN | Facility: CLINIC | Age: 27
End: 2022-03-01
Payer: COMMERCIAL

## 2022-03-01 DIAGNOSIS — M62.838 MUSCLE SPASM: Primary | ICD-10-CM

## 2022-03-01 PROCEDURE — 99422 OL DIG E/M SVC 11-20 MIN: CPT | Performed by: FAMILY MEDICINE

## 2022-03-01 RX ORDER — CYCLOBENZAPRINE HCL 5 MG
5 TABLET ORAL 3 TIMES DAILY PRN
Qty: 20 TABLET | Refills: 0 | Status: SHIPPED | OUTPATIENT
Start: 2022-03-01 | End: 2022-05-04

## 2022-03-01 NOTE — PATIENT INSTRUCTIONS
Thank you for choosing us for your care. I have placed an order for a prescription so that you can start treatment. View your full visit summary for details by clicking on the link below. Your pharmacist will able to address any questions you may have about the medication.      If you're not feeling better within 2-3 weeks please schedule an appointment.  You can schedule an appointment right here in [facility], or call [phone]  If the visit is for the same symptoms as your eVisit, we'll refund the cost of your eVisit if seen within seven days.

## 2022-03-07 ENCOUNTER — MYC MEDICAL ADVICE (OUTPATIENT)
Dept: FAMILY MEDICINE | Facility: CLINIC | Age: 27
End: 2022-03-07
Payer: COMMERCIAL

## 2022-03-08 ENCOUNTER — MYC REFILL (OUTPATIENT)
Dept: FAMILY MEDICINE | Facility: CLINIC | Age: 27
End: 2022-03-08
Payer: COMMERCIAL

## 2022-03-08 ENCOUNTER — HOSPITAL ENCOUNTER (OUTPATIENT)
Dept: PHYSICAL THERAPY | Facility: CLINIC | Age: 27
Setting detail: THERAPIES SERIES
End: 2022-03-08
Attending: FAMILY MEDICINE
Payer: COMMERCIAL

## 2022-03-08 DIAGNOSIS — F41.9 ANXIETY: ICD-10-CM

## 2022-03-08 DIAGNOSIS — G43.809 OTHER MIGRAINE WITHOUT STATUS MIGRAINOSUS, NOT INTRACTABLE: ICD-10-CM

## 2022-03-08 DIAGNOSIS — M54.2 CERVICALGIA: Primary | ICD-10-CM

## 2022-03-08 PROCEDURE — 97140 MANUAL THERAPY 1/> REGIONS: CPT | Mod: GP | Performed by: PHYSICAL THERAPIST

## 2022-03-08 PROCEDURE — 97110 THERAPEUTIC EXERCISES: CPT | Mod: GP | Performed by: PHYSICAL THERAPIST

## 2022-03-08 NOTE — TELEPHONE ENCOUNTER
Call placed to patient.  Scheduled visit for tomorrow with Dr Uribe, arrival at 1410.  Haritha Thornton RN

## 2022-03-09 ENCOUNTER — OFFICE VISIT (OUTPATIENT)
Dept: FAMILY MEDICINE | Facility: CLINIC | Age: 27
End: 2022-03-09
Payer: COMMERCIAL

## 2022-03-09 ENCOUNTER — TRANSFERRED RECORDS (OUTPATIENT)
Dept: HEALTH INFORMATION MANAGEMENT | Facility: CLINIC | Age: 27
End: 2022-03-09

## 2022-03-09 VITALS
HEART RATE: 93 BPM | OXYGEN SATURATION: 98 % | DIASTOLIC BLOOD PRESSURE: 74 MMHG | TEMPERATURE: 97.7 F | SYSTOLIC BLOOD PRESSURE: 120 MMHG

## 2022-03-09 DIAGNOSIS — F33.1 MODERATE EPISODE OF RECURRENT MAJOR DEPRESSIVE DISORDER (H): ICD-10-CM

## 2022-03-09 DIAGNOSIS — M54.2 CERVICALGIA: Primary | ICD-10-CM

## 2022-03-09 DIAGNOSIS — F98.8 ATTENTION DEFICIT DISORDER (ADD) WITHOUT HYPERACTIVITY: ICD-10-CM

## 2022-03-09 DIAGNOSIS — G43.009 MIGRAINE WITHOUT AURA AND WITHOUT STATUS MIGRAINOSUS, NOT INTRACTABLE: ICD-10-CM

## 2022-03-09 DIAGNOSIS — F41.9 ANXIETY: ICD-10-CM

## 2022-03-09 PROCEDURE — 99214 OFFICE O/P EST MOD 30 MIN: CPT | Performed by: FAMILY MEDICINE

## 2022-03-09 RX ORDER — GUANFACINE 1 MG/1
1 TABLET ORAL AT BEDTIME
Qty: 30 TABLET | Refills: 3 | Status: SHIPPED | OUTPATIENT
Start: 2022-03-09 | End: 2022-05-04

## 2022-03-09 RX ORDER — SUMATRIPTAN 50 MG/1
50 TABLET, FILM COATED ORAL
Qty: 10 TABLET | Refills: 0 | Status: SHIPPED | OUTPATIENT
Start: 2022-03-09 | End: 2022-04-08

## 2022-03-09 RX ORDER — PRAZOSIN HYDROCHLORIDE 1 MG/1
1-5 CAPSULE ORAL
Qty: 60 CAPSULE | Refills: 3 | Status: SHIPPED | OUTPATIENT
Start: 2022-03-09 | End: 2022-04-06

## 2022-03-09 RX ORDER — TIZANIDINE 2 MG/1
1-4 TABLET ORAL 3 TIMES DAILY
Qty: 90 TABLET | Refills: 1 | Status: SHIPPED | OUTPATIENT
Start: 2022-03-09 | End: 2023-02-22

## 2022-03-09 RX ORDER — RIZATRIPTAN BENZOATE 5 MG/1
5 TABLET, ORALLY DISINTEGRATING ORAL
Qty: 10 TABLET | Refills: 1 | Status: SHIPPED | OUTPATIENT
Start: 2022-03-09 | End: 2022-05-16

## 2022-03-09 RX ORDER — HYDROXYZINE HYDROCHLORIDE 25 MG/1
25 TABLET, FILM COATED ORAL EVERY 6 HOURS PRN
Qty: 30 TABLET | Refills: 0 | Status: SHIPPED | OUTPATIENT
Start: 2022-03-09 | End: 2022-04-04

## 2022-03-09 ASSESSMENT — PATIENT HEALTH QUESTIONNAIRE - PHQ9
SUM OF ALL RESPONSES TO PHQ QUESTIONS 1-9: 20
10. IF YOU CHECKED OFF ANY PROBLEMS, HOW DIFFICULT HAVE THESE PROBLEMS MADE IT FOR YOU TO DO YOUR WORK, TAKE CARE OF THINGS AT HOME, OR GET ALONG WITH OTHER PEOPLE: EXTREMELY DIFFICULT
SUM OF ALL RESPONSES TO PHQ QUESTIONS 1-9: 20

## 2022-03-09 NOTE — TELEPHONE ENCOUNTER
Routing refill request to provider for review/approval because:  PHQ9: 21 on 10/2021  PCP to determine refill    Maximino Clemente RN

## 2022-03-09 NOTE — PROGRESS NOTES
Assessment & Plan     Cervicalgia  We will have her try tizanidine instead recommend physical therapy  - tiZANidine (ZANAFLEX) 2 MG tablet; Take 0.5-2 tablets (1-4 mg) by mouth 3 times daily  - Physical Therapy Referral; Future    Moderate episode of recurrent major depressive disorder (H)  This is well controlled right now    Migraine without aura and without status migrainosus, not intractable  We will have her try the Maxalt multi she does not get complete relief with this Imitrex  - rizatriptan (MAXALT-MLT) 5 MG ODT; Take 1 tablet (5 mg) by mouth at onset of headache for migraine May repeat in 2 hours. Max 6 tablets/24 hours.    Attention deficit disorder (ADD) without hyperactivity  This helps with her sleep so I will reorder it  - guanFACINE (TENEX) 1 MG tablet; Take 1 tablet (1 mg) by mouth At Bedtime    Anxiety  This is helping some with the nightmares but she is only taking 2 mg when I have her slowly increase if she is taking more than 5 mg we will keep upping it by 1 mg every couple weeks.  - prazosin (MINIPRESS) 1 MG capsule; Take 1-5 capsules (1-5 mg) by mouth nightly as needed (for nightmares) Increase by 1 cap every few nights until the dreams are gone      See Patient Instructions    No follow-ups on file.    Maida Uribe MD  [facility] MALLORIE Tan is a 26 year old who presents for the following health issues     History of Present Illness       Back Pain:  She presents for follow up of back pain. Patient's back pain is a new problem.    Original cause of back pain: not sure  First noticed back pain: 1-4 weeks ago  Patient feels back pain: comes and goesLocation of back pain:  Right upper back, right side of neck and right shoulder  Description of back pain: burning, cramping, gnawing, sharp, shooting and stabbing  Back pain spreads: right shoulder and right side of neck    Since patient first noticed back pain, pain is: unchanged  Does back pain interfere with  "her job:  Yes  On a scale of 1-10 (10 being the worst), patient describes pain as:  7  What makes back pain worse: bending, coughing, certain positions, sitting, standing and twisting  Acupuncture: not tried  Acetaminophen: not helpful  Activity or exercise: not helpful  Chiropractor:  Not tried  Cold: not helpful  Heat: not helpful  Massage: not helpful  Muscle relaxants: not helpful  NSAIDS: not helpful  Opioids: not tried  Physical Therapy: not tried  Rest: not helpful  Steroid Injection: not tried  Stretching: not helpful  Surgery: not tried  TENS unit: not tried  Topical pain relievers: not helpful  Other healthcare providers patient is seeing for back pain: None    Mental Health Follow-up:                    Today's PHQ-9         PHQ-9 Total Score: 20  PHQ-9 Q9 Thoughts of better off dead/self-harm past 2 weeks :   (P) Several days  Thoughts of suicide or self harm: (P) No  Self-harm Plan:     Self-harm Action:       Safety concerns for self or others: (P) No    How difficult have these problems made it for you to do your work, take care of things at home, or get along with other people: Extremely difficult        Migraines:   Since the patient's last clinic visit, headaches are: worsened  The patient is getting headaches:  Daily with neck pain, previously on weekly basis usually  She is not able to do normal daily activities when she has a migraine.  The patient is taking the following rescue/relief medications:  Ibuprofen (Advil, Motrin), Naproxyn (Aleve), Tylenol and sumatriptan (Imitrex)   Patient states \"I get some relief\" from the rescue/relief medications.   The patient is taking the following medications to prevent migraines:  No medications to prevent migraines  In the past 4 weeks, the patient has gone to an Urgent Care or Emergency Room 0 times times due to headaches.    Reason for visit:  Neck pain causing headache and arm limitations  Symptom onset:  1-2 weeks ago  Symptoms include:  Pain, " numbness, sharp sudden pain, headache along back of skull, tension, limited mobility, weakened   Symptom intensity:  Moderate  Symptom progression:  Staying the same  Had these symptoms before:  No  What makes it worse:  Trying to use my right arm  What makes it better:  Sleeping with neck pillow    She eats 4 or more servings of fruits and vegetables daily.She consumes 0 sweetened beverage(s) daily.She exercises with enough effort to increase her heart rate 10 to 19 minutes per day.  She exercises with enough effort to increase her heart rate 7 days per week.   She is taking medications regularly.     509    She has been seeing psych tried many different things has last tried ketamine   She has tried latuda and abilify not helpful depakote and     She had been out of town and she had been taking flexeril for the muscle spasm and then she slept all day       Review of Systems   Constitutional, HEENT, cardiovascular, pulmonary, gi and gu systems are negative, except as otherwise noted.      Objective    /74   Pulse 93   Temp 97.7  F (36.5  C) (Tympanic)   SpO2 98%   There is no height or weight on file to calculate BMI.  Physical Exam   GENERAL: healthy, alert and no distress  NECK: no adenopathy, no asymmetry, masses, or scars, thyroid normal to palpation and pain with rotation there is tenderness on the right posterior cervical muscles extending into the area of the trapezius.  SKIN: no suspicious lesions or rashes  NEURO: Normal strength and tone, mentation intact and speech normal  BACK: no CVA tenderness, no paralumbar tenderness  PSYCH: mentation appears normal, affect normal/bright    No results found for any visits on 03/09/22.    Maida Uribe M.D.

## 2022-03-10 ASSESSMENT — PATIENT HEALTH QUESTIONNAIRE - PHQ9: SUM OF ALL RESPONSES TO PHQ QUESTIONS 1-9: 20

## 2022-03-15 ENCOUNTER — MYC MEDICAL ADVICE (OUTPATIENT)
Dept: FAMILY MEDICINE | Facility: CLINIC | Age: 27
End: 2022-03-15
Payer: COMMERCIAL

## 2022-03-15 DIAGNOSIS — F51.01 PRIMARY INSOMNIA: Primary | ICD-10-CM

## 2022-03-15 NOTE — TELEPHONE ENCOUNTER
Clinton Chang - hope you are having a nice day.  Can you take a peek at this MyChart message and let me know if it would be an appropriate referral for me to place?    Thanks!    Cristela

## 2022-03-16 NOTE — TELEPHONE ENCOUNTER
She does not have a managed care plan so it is fine for her to go to [facility] and we can send order there.  Thank you for checking!  Charlene/THIERNO [facility] Referrals

## 2022-03-17 RX ORDER — RNA INGREDIENT BNT-162B2 0.23 G/1.8ML
INJECTION, SUSPENSION INTRAMUSCULAR
COMMUNITY
Start: 2021-09-28 | End: 2022-04-06

## 2022-03-22 ENCOUNTER — TRANSFERRED RECORDS (OUTPATIENT)
Dept: HEALTH INFORMATION MANAGEMENT | Facility: CLINIC | Age: 27
End: 2022-03-22
Payer: COMMERCIAL

## 2022-03-29 ENCOUNTER — TRANSFERRED RECORDS (OUTPATIENT)
Dept: HEALTH INFORMATION MANAGEMENT | Facility: CLINIC | Age: 27
End: 2022-03-29

## 2022-04-01 ENCOUNTER — HOSPITAL ENCOUNTER (OUTPATIENT)
Dept: PHYSICAL THERAPY | Facility: CLINIC | Age: 27
Setting detail: THERAPIES SERIES
Discharge: HOME | End: 2022-04-01
Attending: FAMILY MEDICINE
Payer: COMMERCIAL

## 2022-04-01 DIAGNOSIS — M54.2 CERVICALGIA: ICD-10-CM

## 2022-04-01 PROCEDURE — 97140 MANUAL THERAPY 1/> REGIONS: CPT | Mod: GP | Performed by: PHYSICAL THERAPIST

## 2022-04-01 PROCEDURE — 97161 PT EVAL LOW COMPLEX 20 MIN: CPT | Mod: GP | Performed by: PHYSICAL THERAPIST

## 2022-04-01 PROCEDURE — 97110 THERAPEUTIC EXERCISES: CPT | Mod: GP | Performed by: PHYSICAL THERAPIST

## 2022-04-01 NOTE — PROGRESS NOTES
[facility]    OUTPATIENT PHYSICAL THERAPY ORTHOPEDIC EVALUATION  PLAN OF TREATMENT FOR OUTPATIENT REHABILITATION  (COMPLETE FOR INITIAL CLAIMS ONLY)  Patient's Last Name, First Name, M.I.  YOB: 1995  Britney Singh    Provider s Name:  [facility]   Medical Record No.  [id number]   Start of Care Date:  04/01/22   Onset Date:  03/01/22   Type:     _X__PT   ___OT   ___SLP Medical Diagnosis:  Cervicalgia M54.2      PT Diagnosis:  neck pain, headaches    Visits from SOC:  1      _________________________________________________________________________________  Plan of Treatment/Functional Goals:  joint mobilization, manual therapy, neuromuscular re-education, ROM, strengthening, stretching           Goals  Goal Identifier: 1  Goal Description: Patient will report a reduction in headache frequency to 1x a week or less.   Target Date: 05/27/22    Goal Identifier: 2  Goal Description: Patient will be able to turn head to at least 70 degrees both directions pain free to be able to look for traffic while driving.   Target Date: 05/27/22    Goal Identifier: 3  Goal Description: Patient will be able to reach top shelf in cupboard without pain.   Target Date: 05/27/22    Goal Identifier: 4  Goal Description: Patient will be independent with HEP to aid functional recovery.   Target Date: 05/27/22                Therapy Frequency:  1 time/week  Predicted Duration of Therapy Intervention:  8 weeks    Hermelinda Steel, PT                 I CERTIFY THE NEED FOR THESE SERVICES FURNISHED UNDER        THIS PLAN OF TREATMENT AND WHILE UNDER MY CARE     (Physician co-signature of this document indicates review and certification of the therapy plan).                       Certification Date From:  04/01/22   Certification Date To:  05/27/22    Referring Provider:  Maida Uribe  MD    Initial Assessment        See Epic Evaluation Start of Care Date: 04/01/22

## 2022-04-01 NOTE — PROGRESS NOTES
PHYSICAL THERAPY INITIAL EVALUATION  04/01/22 1300   General Information   Type of Visit Initial OP Ortho PT Evaluation   Start of Care Date 04/01/22   Referring Physician Maida Uribe MD   Patient/Family Goals Statement get back to her baseline    Orders Evaluate and Treat   Date of Order 03/09/22   Certification Required? Yes   Medical Diagnosis Cervicalgia M54.2    Surgical/Medical history reviewed Yes   Precautions/Limitations no known precautions/limitations   Body Part(s)   Body Part(s) Cervical Spine   Presentation and Etiology   Pertinent history of current problem (include personal factors and/or comorbidities that impact the POC) Took a road trip to [place], started hurting on the drive and was very painful by the time they arrived. Did an e-visit and got a muscle relaxer which helped some. States that pain has gotten better then times worse but not as bad as the initial onset. Getting headaches from neck at least twice week and feels that it has triggered migraines to increase, on average her baseline was about 1x a week.    Impairments A. Pain;D. Decreased ROM;E. Decreased flexibility;N. Headaches   Functional Limitations perform activities of daily living;perform required work activities;perform desired leisure / sports activities   Symptom Location R side of neck, radiates down the arm to hand, some tingling in the hand    How/Where did it occur From insidious onset   Onset date of current episode/exacerbation 03/01/22   Chronicity Other  (has had neck pain in past but not as severe )   Pain rating (0-10 point scale) Best (/10);Worst (/10)   Best (/10) 5   Worst (/10) 8   Pain quality A. Sharp;C. Aching;E. Shooting;G. Cramping   Frequency of pain/symptoms A. Constant   Pain/symptoms exacerbated by B. Walking;C. Lifting;D. Carrying;G. Certain positions;J. ADL;K. Home tasks;L. Work tasks   Pain/symptoms eased by G. Heat;K. Other   Pain eased by comment neck pillow at night   Progression of  symptoms since onset: Unchanged   Prior Level of Function   Prior Level of Function-Mobility independent   Prior Level of Function-ADLs independent    Current Level of Function   Patient role/employment history A. Employed;B. 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 No   Have you fallen and had an injury in the past year? No   Is patient a fall risk? No   Abuse Screen (yes response referral indicated)   Feels Unsafe at Home or Work/School no   Feels Threatened by Someone no   Does Anyone Try to Keep You From Having Contact with Others or Doing Things Outside Your Home? no   Physical Signs of Abuse Present no   Patient needs abuse support services and resources No   Functional Scales   Functional Scales Other   Other Scales  NDI: 62   Cervical Spine   Posture increased kyphosis at CT junction    Cervical Flexion ROM mild nam    Cervical Extension ROM 12, pain R side    Cervical Right Side Bending ROM 22   Cervical Left Side Bending ROM 37   Cervical Right Rotation ROM 57, pain   Cervical Left Rotation ROM 60    Thoracic Right Rotation 100%, some pain R side of nck   Thoracic Left Rotation 100%    Shoulder AROM Screen L WNL, R flex to 90, abd to 90, ER WNL    Shoulder Shrug (C2-C4) Strength 5   Shoulder Abd (C5) Strength R 4 pain L 5   Shoulder ER (C5, C6) Strength R 4 pain L 5   Shoulder IR (C5, C6) Strength R 4 pain L 5   Elbow Flexion (C5, C6) Strength R 4 pain L 5   Elbow Extension (C7) Strength R 4 pain L 5   Wrist Extension (C6) Strength 5   Wrist Flexion (C7) Strength 5   Thumb Abd (C8) Strength 5   5th Finger Add (T1) Strength 5   Vertebral Artery Test -   Alar Ligament Test -   Transverse Ligament Test -   Spurling Test + for local facet pain on the R C5/6   Coleman Impingement Test painful located in R UT, not in GH joint    Segmental Mobility-Cervical hypomobile side glides C2-5 on the R    Palpation  very tight and tender SO R > L, tight and tender cervical paraspinals and UT    Planned Therapy Interventions   Planned Therapy Interventions joint mobilization;manual therapy;neuromuscular re-education;ROM;strengthening;stretching   Clinical Impression   Criteria for Skilled Therapeutic Interventions Met yes, treatment indicated   PT Diagnosis neck pain, headaches    Influenced by the following impairments pain, decreased ROM, decreased flexibilty, decreased strength   Functional limitations due to impairments turning head, lifting, carrying, daily house and work tasks   Clinical Presentation Stable/Uncomplicated   Clinical Presentation Rationale sx stable   Clinical Decision Making (Complexity) Low complexity   Therapy Frequency 1 time/week   Predicted Duration of Therapy Intervention (days/wks) 8 weeks   Risk & Benefits of therapy have been explained Yes   Patient, Family & other staff in agreement with plan of care Yes   Education Assessment   Preferred Learning Style Listening;Demonstration;Pictures/video   Barriers to Learning No barriers   ORTHO GOALS   PT Ortho Eval Goals 1;2;3   Ortho Goal 1   Goal Identifier 1   Goal Description Patient will report a reduction in headache frequency to 1x a week or less.    Target Date 05/27/22   Ortho Goal 2   Goal Identifier 2   Goal Description Patient will be able to turn head to at least 70 degrees both directions pain free to be able to look for traffic while driving.    Target Date 05/27/22   Ortho Goal 3   Goal Identifier 3   Goal Description Patient will be able to reach top shelf in cupboard without pain.    Target Date 05/27/22   Ortho Goal 4   Goal Identifier 4   Goal Description Patient will be independent with HEP to aid functional recovery.    Target Date 05/27/22   Total Evaluation Time   PT Eval, Low Complexity Minutes (49261) 30   Therapy Certification   Certification date from 04/01/22   Certification date to 05/27/22   Medical Diagnosis Cervicalgia M54.2         Please contact me with any questions or concerns.  Thank you for your referral.    Hermelinda Steel, PT, DPT, OCS  Physical Therapist, Orthopedic Certified Specialist    [facility]  [address] [address]  [email or web address]   Office: [phone]   Employed by [facility]

## 2022-04-02 DIAGNOSIS — F41.9 ANXIETY: ICD-10-CM

## 2022-04-04 RX ORDER — HYDROXYZINE HYDROCHLORIDE 25 MG/1
TABLET, FILM COATED ORAL
Qty: 30 TABLET | Refills: 0 | Status: SHIPPED | OUTPATIENT
Start: 2022-04-04 | End: 2022-06-20

## 2022-04-04 NOTE — TELEPHONE ENCOUNTER
Ordered 3/9/22, 30 tabs.  Patient has appointment with Dr Arnold 4/6/22, will forward to PCP to review.  Haritha Thornton RN

## 2022-04-06 ENCOUNTER — VIRTUAL VISIT (OUTPATIENT)
Dept: FAMILY MEDICINE | Facility: CLINIC | Age: 27
End: 2022-04-06
Payer: COMMERCIAL

## 2022-04-06 DIAGNOSIS — F41.9 ANXIETY: ICD-10-CM

## 2022-04-06 DIAGNOSIS — F98.8 ATTENTION DEFICIT DISORDER (ADD) WITHOUT HYPERACTIVITY: Primary | ICD-10-CM

## 2022-04-06 PROCEDURE — 99214 OFFICE O/P EST MOD 30 MIN: CPT | Mod: 95 | Performed by: FAMILY MEDICINE

## 2022-04-06 RX ORDER — PRAZOSIN HYDROCHLORIDE 1 MG/1
1-5 CAPSULE ORAL
Qty: 90 CAPSULE | Refills: 3 | Status: SHIPPED | OUTPATIENT
Start: 2022-04-06 | End: 2022-07-06

## 2022-04-06 RX ORDER — LISDEXAMFETAMINE DIMESYLATE 30 MG/1
30 CAPSULE ORAL EVERY MORNING
Qty: 30 CAPSULE | Refills: 0 | Status: SHIPPED | OUTPATIENT
Start: 2022-04-06 | End: 2022-05-04

## 2022-04-06 ASSESSMENT — ASTHMA QUESTIONNAIRES
ACT_TOTALSCORE: 24
QUESTION_5 LAST FOUR WEEKS HOW WOULD YOU RATE YOUR ASTHMA CONTROL: COMPLETELY CONTROLLED
QUESTION_1 LAST FOUR WEEKS HOW MUCH OF THE TIME DID YOUR ASTHMA KEEP YOU FROM GETTING AS MUCH DONE AT WORK, SCHOOL OR AT HOME: NONE OF THE TIME
QUESTION_3 LAST FOUR WEEKS HOW OFTEN DID YOUR ASTHMA SYMPTOMS (WHEEZING, COUGHING, SHORTNESS OF BREATH, CHEST TIGHTNESS OR PAIN) WAKE YOU UP AT NIGHT OR EARLIER THAN USUAL IN THE MORNING: NOT AT ALL
QUESTION_4 LAST FOUR WEEKS HOW OFTEN HAVE YOU USED YOUR RESCUE INHALER OR NEBULIZER MEDICATION (SUCH AS ALBUTEROL): ONCE A WEEK OR LESS
ACT_TOTALSCORE: 24
QUESTION_2 LAST FOUR WEEKS HOW OFTEN HAVE YOU HAD SHORTNESS OF BREATH: NOT AT ALL

## 2022-04-06 ASSESSMENT — PATIENT HEALTH QUESTIONNAIRE - PHQ9
SUM OF ALL RESPONSES TO PHQ QUESTIONS 1-9: 21
10. IF YOU CHECKED OFF ANY PROBLEMS, HOW DIFFICULT HAVE THESE PROBLEMS MADE IT FOR YOU TO DO YOUR WORK, TAKE CARE OF THINGS AT HOME, OR GET ALONG WITH OTHER PEOPLE: EXTREMELY DIFFICULT
SUM OF ALL RESPONSES TO PHQ QUESTIONS 1-9: 21

## 2022-04-06 NOTE — PROGRESS NOTES
Britney is a 26 year old who is being evaluated via a billable video visit.      How would you like to obtain your AVS? MyChart  If the video visit is dropped, the invitation should be resent by: Text to cell phone: [phone]  Will anyone else be joining your video visit? No      Video Start Time: 12:48 PM    -------------------------    Assessment/Plan:    Britney Singh is a 26 year old female presenting for:    Attention deficit disorder (ADD) without hyperactivity  Discussed risk and benefits of Vyvanse.  Discussed several dosages and she prefers to start on a 30 mg.    Reviewed her evaluation.  Will scan the summary and recommendations.  - lisdexamfetamine (VYVANSE) 30 MG capsule  Dispense: 30 capsule; Refill: 0    Anxiety  Praises and will be sent to the pharmacy with an increased dose of 3 tablets at night.  - prazosin (MINIPRESS) 1 MG capsule  Dispense: 90 capsule; Refill: 3    I personally spent over 35 minutes performing care related to this patient on the day of the patient visit.  This includes chart review, precharting, talking with/ examining the patient as well as completing after visit documentation.      Medications Discontinued During This Encounter   Medication Reason     [employer] COVID-19 VACC 30 MCG/0.3ML injection      prazosin (MINIPRESS) 1 MG capsule Reorder           Chief Complaint:  Recheck Medication          Subjective:   Britney Singh is a pleasant 26 year old female being evaluated via video visit today for the following concern/s:     ADHD: Patient has a past medical history significant for PTSD and potentially depression and anxiety.  She has been on and off of many medications in the past.  She recently had an evaluation at the [facility] (clinic for attention, learning and memory) which showed that she scored high and attention deficit disorder inattentive type.    Patient is seeing a therapist almost weekly.  She states that they have discussed starting medication  for this and she is willing to try something.  She is hopeful to try Vyvanse although would be okay using Adderall if her insurance does not cover Vyvanse.  She is aware of the risks of most common side effects of the medication.    She is also taking guanfacine for her attention issues as well.  She would like to keep taking this medication for little while to see if they work well in concert with each other.    PTSD: Patient is currently using prazosin at night.  She is using 2 mg but wishes to increase to 3 mg.  She states that it has been helpful with her nightmares.      12 point review of systems completed and negative except for what has been described above    History   Smoking Status     Former Smoker     Packs/day: 0.00     Years: 0.00     Quit date: 6/7/2012   Smokeless Tobacco     Never Used         Current Outpatient Medications:      albuterol (PROAIR HFA/PROVENTIL HFA/VENTOLIN HFA) 108 (90 Base) MCG/ACT inhaler, Inhale 2 puffs into the lungs every 6 hours as needed, Disp: , Rfl:      clonazePAM (KLONOPIN) 0.5 MG tablet, Take 1 tablet (0.5 mg) by mouth 2 times daily as needed for anxiety, Disp: 30 tablet, Rfl: 0     DEBLITANE 0.35 MG tablet, Take 0.35 mg by mouth daily, Disp: , Rfl:      Diaphragm Arc-Spring (CAYA) DPRH, Place 1 Units vaginally daily as needed, Disp: 1 each, Rfl: 1     guanFACINE (TENEX) 1 MG tablet, Take 1 tablet (1 mg) by mouth At Bedtime, Disp: 30 tablet, Rfl: 3     hydrOXYzine (ATARAX) 25 MG tablet, TAKE 1 TABLET (25 MG) BY MOUTH EVERY 6 HOURS AS NEEDED FOR ITCHING AND TAKE1 TABLET BY MOUTH THREE TIMES DAILY AS NEEDED FOR ANXIETY, Disp: 30 tablet, Rfl: 0     lisdexamfetamine (VYVANSE) 30 MG capsule, Take 1 capsule (30 mg) by mouth every morning, Disp: 30 capsule, Rfl: 0     ondansetron (ZOFRAN) 4 MG tablet, Take 1 tablet (4 mg) by mouth 3 times daily as needed, Disp: 30 tablet, Rfl: 1     prazosin (MINIPRESS) 1 MG capsule, Take 1-5 capsules (1-5 mg) by mouth nightly as needed (for  nightmares) Increase by 1 cap every few nights until the dreams are gone, Disp: 90 capsule, Rfl: 3     rizatriptan (MAXALT-MLT) 5 MG ODT, Take 1 tablet (5 mg) by mouth at onset of headache for migraine May repeat in 2 hours. Max 6 tablets/24 hours., Disp: 10 tablet, Rfl: 1     SUMAtriptan (IMITREX) 50 MG tablet, Take 1 tablet (50 mg) by mouth at onset of headache for migraine May repeat in 2 hours. Max 4 tablets/24 hours., Disp: 10 tablet, Rfl: 0     tiZANidine (ZANAFLEX) 2 MG tablet, Take 0.5-2 tablets (1-4 mg) by mouth 3 times daily, Disp: 90 tablet, Rfl: 1     cyclobenzaprine (FLEXERIL) 5 MG tablet, Take 1 tablet (5 mg) by mouth 3 times daily as needed for muscle spasms (Patient not taking: Reported on 4/6/2022), Disp: 20 tablet, Rfl: 0        Objective:  No vitals were done due to the nature of this visit  No flowsheet data found.            General: No acute distress  Psych: Appropriate affect  HEENT: moist mucous membranes  Pulmonary: Breathing comfortably, speaking in complete sentences  Extremities: warm and well perfused with no edema  Skin: warm and dry with no rash         This note has been dictated and transcribed using voice recognition software.   Any errors in transcription are unintentional and inherent to the software.       Video-Visit Details    Type of service:  Video Visit    Video End Time:1:07 PM    Originating Location (pt. Location): Home    Distant Location (provider location):  [facility]     Platform used for Video Visit: [employer]  Answers for HPI/ROS submitted by the patient on 4/6/2022  If you checked off any problems, how difficult have these problems made it for you to do your work, take care of things at home, or get along with other people?: Extremely difficult  PHQ9 TOTAL SCORE: 21  How many servings of fruits and vegetables do you eat daily?: 4 or more  On average, how many sweetened beverages do you drink each day (Examples: soda, juice, sweet tea, etc.  Do NOT  count diet or artificially sweetened beverages)?: 0  How many minutes a day do you exercise enough to make your heart beat faster?: 30 to 60  How many days a week do you exercise enough to make your heart beat faster?: 5  How many days per week do you miss taking your medication?: 0  What is the reason for your visit today?: Med check; psych assessment & and new dx discussion  When did your symptoms begin?: More than a month       Improved.

## 2022-04-07 ASSESSMENT — PATIENT HEALTH QUESTIONNAIRE - PHQ9: SUM OF ALL RESPONSES TO PHQ QUESTIONS 1-9: 21

## 2022-04-08 ENCOUNTER — HOSPITAL ENCOUNTER (OUTPATIENT)
Dept: PHYSICAL THERAPY | Facility: CLINIC | Age: 27
Setting detail: THERAPIES SERIES
Discharge: HOME | End: 2022-04-08
Attending: FAMILY MEDICINE
Payer: COMMERCIAL

## 2022-04-08 DIAGNOSIS — G43.809 OTHER MIGRAINE WITHOUT STATUS MIGRAINOSUS, NOT INTRACTABLE: ICD-10-CM

## 2022-04-08 PROCEDURE — 97140 MANUAL THERAPY 1/> REGIONS: CPT | Mod: GP | Performed by: PHYSICAL THERAPIST

## 2022-04-08 RX ORDER — SUMATRIPTAN 50 MG/1
TABLET, FILM COATED ORAL
Qty: 10 TABLET | Refills: 0 | Status: SHIPPED | OUTPATIENT
Start: 2022-04-08 | End: 2022-06-20

## 2022-04-08 NOTE — TELEPHONE ENCOUNTER
Routing refill request to provider for review/approval because:  Serotonin Agonists Failed 04/08/2022 12:58 AM   Protocol Details  Serotonin Agonist request needs review.     Haritha Thornton RN

## 2022-04-12 ENCOUNTER — HOSPITAL ENCOUNTER (OUTPATIENT)
Dept: PHYSICAL THERAPY | Facility: CLINIC | Age: 27
Setting detail: THERAPIES SERIES
Discharge: HOME | End: 2022-04-12
Attending: FAMILY MEDICINE
Payer: COMMERCIAL

## 2022-04-12 PROCEDURE — 97140 MANUAL THERAPY 1/> REGIONS: CPT | Mod: GP | Performed by: PHYSICAL THERAPIST

## 2022-04-12 PROCEDURE — 97110 THERAPEUTIC EXERCISES: CPT | Mod: GP | Performed by: PHYSICAL THERAPIST

## 2022-04-14 ENCOUNTER — HOSPITAL ENCOUNTER (OUTPATIENT)
Dept: PHYSICAL THERAPY | Facility: CLINIC | Age: 27
Setting detail: THERAPIES SERIES
Discharge: HOME | End: 2022-04-14
Attending: FAMILY MEDICINE
Payer: COMMERCIAL

## 2022-04-14 PROCEDURE — 97110 THERAPEUTIC EXERCISES: CPT | Mod: GP | Performed by: PHYSICAL THERAPIST

## 2022-04-14 PROCEDURE — 97140 MANUAL THERAPY 1/> REGIONS: CPT | Mod: GP | Performed by: PHYSICAL THERAPIST

## 2022-04-14 NOTE — PROGRESS NOTES
Physical Therapy Progress Note and Medicare RECERTIFICATION    Britney Singh  1995    Session Number: 15 since start of care.    Reasons for Continuing Treatment:   Pt has progressed with PFM strength and control to reduce leaking, but continues to have tailbone and pelvic floor mm pain.  Pt could benefit from skilled PT to reduce pain and improve core stability to prevent return of pain.     Frequency/Duration  1 times per every other week for 4 weeks for a total of 2 visits.    Medical Diagnosis:Pelvic Floor Muscle Dysfunction; coccydynia    Onset Date:8/24/21    Start of Care Date: 9/2/21    Recertification Period  3/13/22 - 5/26/22    Physician Signature:    Date:    X_______________________________________________________    Physician Name: Anushka Arnold MD    I certify the need for these services furnished under this plan of treatment and while under my care. Physician co-signature of this document indicates review and certification of the therapy plan.  This signature may be written on paper, or electronically signed within EPIC.          04/14/22 1400   Signing Clinician's Name / Credentials   Signing clinician's name / credentials Silvia Yo PT MA #[id number]   Session Number   Session Number 15   Authorization status Onset: 8/24/21, SOC: 9/2/21   Progress Note/Recertification   Progress Note Due Date 03/13/22   Progress Note Completed Date 04/14/22   Recertification Due Date 03/13/22   Ortho Goal 1   Goal Identifier STG 1   Goal Description Pt will report hip pain no greater than 6/10   Goal Progress Not Met: rates at 7/10  (cont for 4 weeks)   Target Date 04/28/22   Ortho Goal 2   Goal Identifier LTG 1   Goal Description Pt will report hip pain no greater than 3/10   Goal Progress See Goal #1  (cont for 2 more weeks. )   Target Date 05/12/22   Ortho Goal 3   Goal Identifier STG 2   Goal Description Pt will report no urine leaking for 2 weeks for improved continence   Goal Progress Met: no  "leaking and has not tried jumping. Has not been able to do any jump roping.    Target Date 03/08/22   Date Met 03/08/22   Ortho Goal 4   Goal Identifier LTG 2   Goal Description Pt will report no urine leaking for one month for improved continence   Goal Progress Met: for daily activities, but not tried with Jump Rope Workout.   Target Date 03/08/22   Date Met 03/08/22   Ortho Goal 5   Goal Identifier STG 3   Goal Description Pt will improve general LE and pelvic floor strength to at least 3+/5 for improved muscle performance and decreased pain   Target Date 10/14/21   Date Met 10/22/21   Ortho Goal 6   Goal Identifier LTG 3   Goal Description Pt will improve general LE and pelvic floor strength to at least 4/5 for improved muscle performance and decreased pain   Target Date 04/05/22   Subjective Report   Subjective Report Primary c/o today is tailbone pain.  Feeling this with sitting.  Additionally, cannot stand still for any length of time as this increases her hip and SIJ/LBP areas. Using the flag fold towel and this seems to help relieve SIJ and LB pressures.   Objective Measure 1   Objective Measure Pain   Details Tailbone = 8/10, (B) Hips = 7/10   Objective Measure 2   Objective Measure Pelvic Alignment   Details WNL in sagittal plane, and in transverse plane   Objective Measure 3   Objective Measure Palpation   Details Tender (L) > (R) at sacrospinous ligs; pain with direct PA pressure Gr I at base of coccyx.  Tender \"but bearable\" with palpation of coccyx mobility; tender (B) coccygeus mm   Therapeutic Procedure/exercise   Therapeutic Procedures: strength, endurance, ROM, flexibillity minutes (06431) 14   Skilled Intervention Exer: stretching, strengthening, self-rolling of mm; progression of HEP   Patient Response Pt states \"I just would not do the exers if I did not feel they were helping somewhat\" when asked if pt feels exers are doing some good for her symptoms.   Treatment Detail Reviewed and demo'd: " "folded flag towel support position, rolling pin (PT just motioned and palpated) up/down ITB (B), verbally reviewed ice massage, and completed piriformis and OI stretching in supine.   Manual Therapy   Manual Therapy: Mobilization, MFR, MLD, friction massage minutes (15743) 40   Skilled Intervention MT: mobs, STM, TrP releases   Patient Response Pt \"tolerated\" the coccyx mobs well, no increase in lasting pain after rx.   Treatment Detail Prone for internal coccyx mobs and STM of coccygeus mm, with rectal approach.  Gr II AP/PA mobs with external and internal guiding with finger palpation. TrP release (R) coccygeus mm and gentle stretching (B) coccygeus mm.  Prone for sacral diagonal rocking, sacral base/L5 distraction mobs with Gr II pressure, UPA's through full lumbar spine and TrP release and STM to (B) piriformis and glute med mms.   Plan   Homework [id number]   Home program Pt knows HEP well.   Plan for next session Cont with MT and possibly try re-taping for tailbone posterior draw.  Advance HEP prn to progress toward goals.   Pelvic Health Only: Informed Consent   Pelvic Health Informed Consent Statement Discussed with patient/guardian reason for referral regarding pelvic health needs and external/internal pelvic floor muscle examination.  Opportunity provided to ask questions and verbal consent for assessment and intervention was given.   Total Session Time   Timed Code Treatment Minutes 54 (TE,3MT)   Total Treatment Time (sum of timed and untimed services) 54 (TE,3MT)   Thank you for the referral of this patient.  Silvia Yo, PT, MA  #[id number]      "

## 2022-04-25 ENCOUNTER — TRANSFERRED RECORDS (OUTPATIENT)
Dept: HEALTH INFORMATION MANAGEMENT | Facility: CLINIC | Age: 27
End: 2022-04-25
Payer: COMMERCIAL

## 2022-04-26 ENCOUNTER — HOSPITAL ENCOUNTER (OUTPATIENT)
Dept: PHYSICAL THERAPY | Facility: CLINIC | Age: 27
Setting detail: THERAPIES SERIES
Discharge: HOME | End: 2022-04-26
Attending: FAMILY MEDICINE
Payer: COMMERCIAL

## 2022-04-26 PROCEDURE — 97140 MANUAL THERAPY 1/> REGIONS: CPT | Mod: GP | Performed by: PHYSICAL THERAPIST

## 2022-04-28 ENCOUNTER — HOSPITAL ENCOUNTER (OUTPATIENT)
Dept: PHYSICAL THERAPY | Facility: CLINIC | Age: 27
Setting detail: THERAPIES SERIES
Discharge: HOME | End: 2022-04-28
Attending: FAMILY MEDICINE
Payer: COMMERCIAL

## 2022-04-28 PROCEDURE — 97140 MANUAL THERAPY 1/> REGIONS: CPT | Mod: GP | Performed by: PHYSICAL THERAPIST

## 2022-04-28 PROCEDURE — 97110 THERAPEUTIC EXERCISES: CPT | Mod: GP | Performed by: PHYSICAL THERAPIST

## 2022-05-04 ENCOUNTER — VIRTUAL VISIT (OUTPATIENT)
Dept: FAMILY MEDICINE | Facility: CLINIC | Age: 27
End: 2022-05-04
Payer: COMMERCIAL

## 2022-05-04 DIAGNOSIS — F33.1 MODERATE EPISODE OF RECURRENT MAJOR DEPRESSIVE DISORDER (H): ICD-10-CM

## 2022-05-04 DIAGNOSIS — F41.9 ANXIETY: ICD-10-CM

## 2022-05-04 DIAGNOSIS — F98.8 ATTENTION DEFICIT DISORDER (ADD) WITHOUT HYPERACTIVITY: Primary | ICD-10-CM

## 2022-05-04 PROCEDURE — 99214 OFFICE O/P EST MOD 30 MIN: CPT | Mod: 95 | Performed by: FAMILY MEDICINE

## 2022-05-04 RX ORDER — PROPRANOLOL HYDROCHLORIDE 10 MG/1
10 TABLET ORAL 2 TIMES DAILY
Qty: 60 TABLET | Refills: 3 | Status: SHIPPED | OUTPATIENT
Start: 2022-05-04 | End: 2022-07-06

## 2022-05-04 RX ORDER — DEXTROAMPHETAMINE SACCHARATE, AMPHETAMINE ASPARTATE, DEXTROAMPHETAMINE SULFATE AND AMPHETAMINE SULFATE 1.25; 1.25; 1.25; 1.25 MG/1; MG/1; MG/1; MG/1
5 TABLET ORAL DAILY
Qty: 30 TABLET | Refills: 0 | Status: SHIPPED | OUTPATIENT
Start: 2022-05-04 | End: 2022-06-07

## 2022-05-04 ASSESSMENT — PAIN SCALES - GENERAL: PAINLEVEL: SEVERE PAIN (6)

## 2022-05-04 NOTE — PROGRESS NOTES
Britney is a 26 year old who is being evaluated via a billable video visit.      How would you like to obtain your AVS? MyChart  If the video visit is dropped, the invitation should be resent by: Text to cell phone: [phone]  Will anyone else be joining your video visit? No      Video Start Time: 12:45 PM    -------------------------    Assessment/Plan:    Britney Singh is a 26 year old female presenting for:    Attention deficit disorder (ADD) without hyperactivity  She will continue her Adderall 10 mg extended release in the morning.  She will do a trial of 5 mg immediate release in the afternoon.  - amphetamine-dextroamphetamine (ADDERALL) 5 MG tablet  Dispense: 30 tablet; Refill: 0    Anxiety  Propranolol sent to the pharmacy.  Potentially this will offset some of the symptoms that she is experiencing from her Adderall.  - propranolol (INDERAL) 10 MG tablet  Dispense: 60 tablet; Refill: 3    Moderate episode of recurrent major depressive disorder (H)  Overall stable at this time.  We will continue with her therapist.        Medications Discontinued During This Encounter   Medication Reason     cyclobenzaprine (FLEXERIL) 5 MG tablet      guanFACINE (TENEX) 1 MG tablet      lisdexamfetamine (VYVANSE) 30 MG capsule            Chief Complaint:  A.D.H.D          Subjective:   Britney Singh is a pleasant 26 year old female being evaluated via video visit today for the following concern/s:     ADHD and depression: Patient has a history of recently diagnosed ADHD.  She was initially started on Vyvanse which she did not think was a good medication for her.  She was and started on Adderall extended release 10 mg daily.  She feels as though the medication has been very helpful.  She does note that sometimes it increases her anxiety slightly but is willing to put up with no side effects due to the benefit she is getting.    She feels that her blood pressure slightly elevated and checks this at home but is generally  less than 130.    No chest pain or shortness of breath.    She also notes that sometimes this will wear off a few hours earlier than she would like it to.  She not having difficulty sleeping.      12 point review of systems completed and negative except for what has been described above    History   Smoking Status     Former Smoker     Packs/day: 0.00     Years: 0.00     Quit date: 6/7/2012   Smokeless Tobacco     Never Used         Current Outpatient Medications:      albuterol (PROAIR HFA/PROVENTIL HFA/VENTOLIN HFA) 108 (90 Base) MCG/ACT inhaler, Inhale 2 puffs into the lungs every 6 hours as needed, Disp: , Rfl:      amphetamine-dextroamphetamine (ADDERALL XR) 10 MG 24 hr capsule, Take 1 capsule (10 mg) by mouth daily, Disp: 30 capsule, Rfl: 0     amphetamine-dextroamphetamine (ADDERALL) 5 MG tablet, Take 1 tablet (5 mg) by mouth daily, Disp: 30 tablet, Rfl: 0     clonazePAM (KLONOPIN) 0.5 MG tablet, Take 1 tablet (0.5 mg) by mouth 2 times daily as needed for anxiety, Disp: 30 tablet, Rfl: 0     DEBLITANE 0.35 MG tablet, Take 0.35 mg by mouth daily, Disp: , Rfl:      Diaphragm Arc-Spring (CAYA) DPRH, Place 1 Units vaginally daily as needed, Disp: 1 each, Rfl: 1     hydrOXYzine (ATARAX) 25 MG tablet, TAKE 1 TABLET (25 MG) BY MOUTH EVERY 6 HOURS AS NEEDED FOR ITCHING AND TAKE1 TABLET BY MOUTH THREE TIMES DAILY AS NEEDED FOR ANXIETY, Disp: 30 tablet, Rfl: 0     ondansetron (ZOFRAN) 4 MG tablet, Take 1 tablet (4 mg) by mouth 3 times daily as needed, Disp: 30 tablet, Rfl: 1     prazosin (MINIPRESS) 1 MG capsule, Take 1-5 capsules (1-5 mg) by mouth nightly as needed (for nightmares) Increase by 1 cap every few nights until the dreams are gone, Disp: 90 capsule, Rfl: 3     propranolol (INDERAL) 10 MG tablet, Take 1 tablet (10 mg) by mouth 2 times daily, Disp: 60 tablet, Rfl: 3     rizatriptan (MAXALT-MLT) 5 MG ODT, Take 1 tablet (5 mg) by mouth at onset of headache for migraine May repeat in 2 hours. Max 6 tablets/24  hours., Disp: 10 tablet, Rfl: 1     SUMAtriptan (IMITREX) 50 MG tablet, TAKE 1 TABLET (50 MG) BY MOUTH AT ONSET OF MIGRAINE HEADACHE, MAY REPEAT IN 2 HOURS. MAX 4 TABLETS/24 HOURS., Disp: 10 tablet, Rfl: 0     tiZANidine (ZANAFLEX) 2 MG tablet, Take 0.5-2 tablets (1-4 mg) by mouth 3 times daily, Disp: 90 tablet, Rfl: 1        Objective:  No vitals were done due to the nature of this visit  No flowsheet data found.            General: No acute distress  Psych: Appropriate affect  HEENT: moist mucous membranes  Pulmonary: Breathing comfortably, speaking in complete sentences  Extremities: warm and well perfused with no edema  Skin: warm and dry with no rash         This note has been dictated and transcribed using voice recognition software.   Any errors in transcription are unintentional and inherent to the software.            Video-Visit Details    Type of service:  Video Visit    Video End Time:1:06 PM    Originating Location (pt. Location): Home    Distant Location (provider location):  [facility]     Platform used for Video Visit: Lukas

## 2022-05-10 ENCOUNTER — TRANSFERRED RECORDS (OUTPATIENT)
Dept: HEALTH INFORMATION MANAGEMENT | Facility: CLINIC | Age: 27
End: 2022-05-10

## 2022-05-10 ENCOUNTER — HOSPITAL ENCOUNTER (OUTPATIENT)
Dept: PHYSICAL THERAPY | Facility: CLINIC | Age: 27
Setting detail: THERAPIES SERIES
Discharge: HOME | End: 2022-05-10
Attending: FAMILY MEDICINE
Payer: COMMERCIAL

## 2022-05-10 PROCEDURE — 97140 MANUAL THERAPY 1/> REGIONS: CPT | Mod: GP | Performed by: PHYSICAL MEDICINE & REHABILITATION

## 2022-05-16 DIAGNOSIS — G43.009 MIGRAINE WITHOUT AURA AND WITHOUT STATUS MIGRAINOSUS, NOT INTRACTABLE: ICD-10-CM

## 2022-05-16 RX ORDER — RIZATRIPTAN BENZOATE 5 MG/1
5 TABLET, ORALLY DISINTEGRATING ORAL
Qty: 10 TABLET | Refills: 0 | Status: SHIPPED | OUTPATIENT
Start: 2022-05-16 | End: 2022-06-20

## 2022-05-25 ASSESSMENT — PATIENT HEALTH QUESTIONNAIRE - PHQ9
SUM OF ALL RESPONSES TO PHQ QUESTIONS 1-9: 10
SUM OF ALL RESPONSES TO PHQ QUESTIONS 1-9: 10
10. IF YOU CHECKED OFF ANY PROBLEMS, HOW DIFFICULT HAVE THESE PROBLEMS MADE IT FOR YOU TO DO YOUR WORK, TAKE CARE OF THINGS AT HOME, OR GET ALONG WITH OTHER PEOPLE: SOMEWHAT DIFFICULT

## 2022-05-31 ENCOUNTER — VIRTUAL VISIT (OUTPATIENT)
Dept: FAMILY MEDICINE | Facility: CLINIC | Age: 27
End: 2022-05-31
Payer: COMMERCIAL

## 2022-05-31 DIAGNOSIS — F98.8 ATTENTION DEFICIT DISORDER (ADD) WITHOUT HYPERACTIVITY: ICD-10-CM

## 2022-05-31 DIAGNOSIS — F41.9 ANXIETY: Primary | ICD-10-CM

## 2022-05-31 PROCEDURE — 99213 OFFICE O/P EST LOW 20 MIN: CPT | Mod: 95 | Performed by: FAMILY MEDICINE

## 2022-05-31 RX ORDER — DEXTROAMPHETAMINE SACCHARATE, AMPHETAMINE ASPARTATE MONOHYDRATE, DEXTROAMPHETAMINE SULFATE AND AMPHETAMINE SULFATE 3.75; 3.75; 3.75; 3.75 MG/1; MG/1; MG/1; MG/1
15 CAPSULE, EXTENDED RELEASE ORAL DAILY
Qty: 30 CAPSULE | Refills: 0 | Status: SHIPPED | OUTPATIENT
Start: 2022-05-31 | End: 2022-06-30

## 2022-05-31 RX ORDER — DEXTROAMPHETAMINE SACCHARATE, AMPHETAMINE ASPARTATE MONOHYDRATE, DEXTROAMPHETAMINE SULFATE AND AMPHETAMINE SULFATE 3.75; 3.75; 3.75; 3.75 MG/1; MG/1; MG/1; MG/1
15 CAPSULE, EXTENDED RELEASE ORAL DAILY
Qty: 30 CAPSULE | Refills: 0 | Status: SHIPPED | OUTPATIENT
Start: 2022-08-01 | End: 2022-07-06

## 2022-05-31 RX ORDER — DEXTROAMPHETAMINE SACCHARATE, AMPHETAMINE ASPARTATE MONOHYDRATE, DEXTROAMPHETAMINE SULFATE AND AMPHETAMINE SULFATE 3.75; 3.75; 3.75; 3.75 MG/1; MG/1; MG/1; MG/1
15 CAPSULE, EXTENDED RELEASE ORAL DAILY
Qty: 30 CAPSULE | Refills: 0 | Status: SHIPPED | OUTPATIENT
Start: 2022-07-01 | End: 2022-07-31

## 2022-05-31 RX ORDER — ALPRAZOLAM 0.5 MG
0.5 TABLET ORAL 3 TIMES DAILY PRN
Qty: 10 TABLET | Refills: 0 | Status: SHIPPED | OUTPATIENT
Start: 2022-05-31 | End: 2022-07-06

## 2022-05-31 NOTE — PROGRESS NOTES
"Britney is a 26 year old who is being evaluated via a billable video visit.      How would you like to obtain your AVS? MyChart  If the video visit is dropped, the invitation should be resent by: Text to cell phone: #  Will anyone else be joining your video visit? No      Video Start Time: 1243    -------------------------    Assessment/Plan:    Britney Singh is a 26 year old female presenting for:    Attention deficit disorder (ADD) without hyperactivity  We will increase her Adderall slightly to 15 mg from 10 mg.  Discussed that we could increase further to 20 mg if she would like.  She does not need a refill of her afternoon \"as needed\" dose that she only takes occasionally.  - amphetamine-dextroamphetamine (ADDERALL XR) 15 MG 24 hr capsule  Dispense: 30 capsule; Refill: 0  - amphetamine-dextroamphetamine (ADDERALL XR) 15 MG 24 hr capsule  Dispense: 30 capsule; Refill: 0  - amphetamine-dextroamphetamine (ADDERALL XR) 15 MG 24 hr capsule  Dispense: 30 capsule; Refill: 0    Anxiety  Refill of limited Xanax sent to the pharmacy  - ALPRAZolam (XANAX) 0.5 MG tablet  Dispense: 10 tablet; Refill: 0        Medications Discontinued During This Encounter   Medication Reason     clonazePAM (KLONOPIN) 0.5 MG tablet      amphetamine-dextroamphetamine (ADDERALL XR) 10 MG 24 hr capsule            Chief Complaint:  A.D.H.D          Subjective:   Britney Singh is a pleasant 26 year old female being evaluated via video visit today for the following concern/s:     ADHD: Patient has a history of recently diagnosed ADHD.  She has been suffering from anxiety and depression for much of her life and was screened for ADHD and was shown to be suffering from this.    She was started on Vyvanse for her request which did not work well for her.  She is now on Adderall which she thinks is helping.  She is on 10 mg standard release daily and occasionally uses a 5 mg immediate release \"boost\" in the afternoon.    She states the Adderall was " working very well for her at first but now she wonders if she would benefit from a slight increased dose.      12 point review of systems completed and negative except for what has been described above    History   Smoking Status     Former Smoker     Packs/day: 0.00     Years: 0.00     Quit date: 6/7/2012   Smokeless Tobacco     Never Used         Current Outpatient Medications:      albuterol (PROAIR HFA/PROVENTIL HFA/VENTOLIN HFA) 108 (90 Base) MCG/ACT inhaler, Inhale 2 puffs into the lungs every 6 hours as needed, Disp: , Rfl:      ALPRAZolam (XANAX) 0.5 MG tablet, Take 1 tablet (0.5 mg) by mouth 3 times daily as needed for anxiety, Disp: 10 tablet, Rfl: 0     amphetamine-dextroamphetamine (ADDERALL XR) 15 MG 24 hr capsule, Take 1 capsule (15 mg) by mouth daily, Disp: 30 capsule, Rfl: 0     [START ON 7/1/2022] amphetamine-dextroamphetamine (ADDERALL XR) 15 MG 24 hr capsule, Take 1 capsule (15 mg) by mouth daily, Disp: 30 capsule, Rfl: 0     [START ON 8/1/2022] amphetamine-dextroamphetamine (ADDERALL XR) 15 MG 24 hr capsule, Take 1 capsule (15 mg) by mouth daily, Disp: 30 capsule, Rfl: 0     amphetamine-dextroamphetamine (ADDERALL) 5 MG tablet, Take 1 tablet (5 mg) by mouth daily, Disp: 30 tablet, Rfl: 0     DEBLITANE 0.35 MG tablet, Take 0.35 mg by mouth daily, Disp: , Rfl:      Diaphragm Arc-Spring (CAYA) DPRH, Place 1 Units vaginally daily as needed, Disp: 1 each, Rfl: 1     hydrOXYzine (ATARAX) 25 MG tablet, TAKE 1 TABLET (25 MG) BY MOUTH EVERY 6 HOURS AS NEEDED FOR ITCHING AND TAKE1 TABLET BY MOUTH THREE TIMES DAILY AS NEEDED FOR ANXIETY, Disp: 30 tablet, Rfl: 0     ondansetron (ZOFRAN) 4 MG tablet, Take 1 tablet (4 mg) by mouth 3 times daily as needed, Disp: 30 tablet, Rfl: 1     prazosin (MINIPRESS) 1 MG capsule, Take 1-5 capsules (1-5 mg) by mouth nightly as needed (for nightmares) Increase by 1 cap every few nights until the dreams are gone, Disp: 90 capsule, Rfl: 3     propranolol (INDERAL) 10 MG  tablet, Take 1 tablet (10 mg) by mouth 2 times daily, Disp: 60 tablet, Rfl: 3     rizatriptan (MAXALT-MLT) 5 MG ODT, Take 1 tablet (5 mg) by mouth at onset of headache for migraine May repeat in 2 hours. Max 6 tablets/24 hours., Disp: 10 tablet, Rfl: 0     SUMAtriptan (IMITREX) 50 MG tablet, TAKE 1 TABLET (50 MG) BY MOUTH AT ONSET OF MIGRAINE HEADACHE, MAY REPEAT IN 2 HOURS. MAX 4 TABLETS/24 HOURS., Disp: 10 tablet, Rfl: 0     tiZANidine (ZANAFLEX) 2 MG tablet, Take 0.5-2 tablets (1-4 mg) by mouth 3 times daily, Disp: 90 tablet, Rfl: 1        Objective:  No vitals were done due to the nature of this visit  No flowsheet data found.            General: No acute distress  Psych: Appropriate affect  HEENT: moist mucous membranes  Pulmonary: Breathing comfortably, speaking in complete sentences  Extremities: warm and well perfused with no edema  Skin: warm and dry with no rash         This note has been dictated and transcribed using voice recognition software.   Any errors in transcription are unintentional and inherent to the software.    Video-Visit Details    Type of service:  Video Visit    Video End Time:103    Originating Location (pt. Location): Home    Distant Location (provider location):  [facility]     Platform used for Video Visit: [employer]

## 2022-06-07 ENCOUNTER — HOSPITAL ENCOUNTER (OUTPATIENT)
Dept: PHYSICAL THERAPY | Facility: CLINIC | Age: 27
Setting detail: THERAPIES SERIES
Discharge: HOME | End: 2022-06-07
Attending: FAMILY MEDICINE
Payer: COMMERCIAL

## 2022-06-07 PROCEDURE — 97110 THERAPEUTIC EXERCISES: CPT | Mod: GP | Performed by: PHYSICAL THERAPIST

## 2022-06-07 PROCEDURE — 97140 MANUAL THERAPY 1/> REGIONS: CPT | Mod: GP | Performed by: PHYSICAL THERAPIST

## 2022-06-08 NOTE — PROGRESS NOTES
Physical Therapy Progress Note and Medicare RECERTIFICATION    Britney Singh  1995    Session Number: 17 since start of care.    Reasons for Continuing Treatment:   Pt continues to have low back and pelvic pain with activity, but has not had leaking (and keeps saying she is not doing the jump roping or prolonged jumping as she had in the past when first reported leaking).     Frequency/Duration  1 time per every other week for 6 weeks for a total of 3 visits.    Medical Diagnosis: Pelvic Floor Muscle dysfunction    Onset Date:8/24/21    Start of Care Date: 9/2/21    Recertification Period  5/26/22 - 7/19/22    Physician Signature:    Date:    X_______________________________________________________    Physician Name: Anushka Arnold MD    I certify the need for these services furnished under this plan of treatment and while under my care. Physician co-signature of this document indicates review and certification of the therapy plan.  This signature may be written on paper, or electronically signed within EPIC.          06/07/22 1100   Signing Clinician's Name / Credentials   Signing clinician's name / credentials Silvia Yo, PT MA #[id number]   Session Number   Session Number 17   Authorization status Onset: 8/24/21, SOC: 9/2/21   Progress Note/Recertification   Progress Note Due Date 05/26/22   Progress Note Completed Date 06/07/22   Recertification Due Date 05/26/22   Ortho Goal 1   Goal Identifier STG 1   Goal Description Pt will report hip pain no greater than 6/10   Goal Progress Not Met: avg is 6--7/10 at (B) hips and tailbone.  (cont for 3 weeks)   Target Date 06/28/22   Ortho Goal 2   Goal Identifier LTG 1   Goal Description Pt will report hip pain no greater than 3/10   Goal Progress See goal #1  (cont for 2 more weeks. )   Target Date 05/12/22   Ortho Goal 3   Goal Identifier STG 2   Goal Description Pt will report no urine leaking for 2 weeks for improved continence   Goal Progress Met: no  "leaking and has not tried jumping. Has not been able to do any jump roping.    Target Date 03/08/22   Date Met 03/08/22   Ortho Goal 4   Goal Identifier LTG 2   Goal Description Pt will report no urine leaking for one month for improved continence   Goal Progress Met: for daily activities, but not tried with Jump Rope Workout.   Target Date 03/08/22   Date Met 03/08/22   Ortho Goal 5   Goal Identifier STG 3   Goal Description Pt will improve general LE and pelvic floor strength to at least 3+/5 for improved muscle performance and decreased pain   Target Date 10/14/21   Date Met 10/22/21   Ortho Goal 6   Goal Identifier LTG 3   Goal Description Pt will improve general LE and pelvic floor strength to at least 4/5 for improved muscle performance and decreased pain   Goal Progress Not Met: gluteals at 4-/5, and still painful with exer and \"general activity.\"   Target Date 04/05/22   Subjective Report   Subjective Report Pt apologized for missing last appt - took migraine med and then fell asleep and missed appt.  States still getting (B) hip and tailbone pain with various activities.  Reports doing all exers as prescribed and is able to recall accurately.   Objective Measure 1   Objective Measure Bowel/Bladder   Details Urination: intermittently will have hesitation, and have to focus on giving time to relax to fully empty;  Bowel: daily, not struggling daily, but will get a couple times per week where she feels urge but can't push it out easily.   Objective Measure 3   Objective Measure Low Back Pain   Details Worst hip and tailbone = 8/10 in last week, usually hovers 6-7/10;  comes on with sitting at tailbone, can also come on with walking/hiking, and with HH chores.   Objective Measure 4   Objective Measure Pelvic Alignment   Details (L) ilium anteriorly rotated; pain (L) proximal SIJ with bridge for clearing.   Therapeutic Procedure/exercise   Therapeutic Procedures: strength, endurance, ROM, flexibillity minutes " "(10293) 25   Skilled Intervention Exer: stretching, strengthening; porgression of HEP   Patient Response Pt told to cont all stretching, but to \"hold off\" on previous strengthening exers and focus only on what was taught today.   Treatment Detail Reviewed and completed kneeling HF stretch, and supine piriformis and OI stretching.  Reviewed and demo'd deep squat at sink.  Taught and completed \"butt burner\" exers for gluteal strengthening. 10 x each of the 5 exers in series: clam position full leg lift, top 1/2 lift, bottom 1/2 lift, combination and SLR with IR at hip (all in SL).   Manual Therapy   Manual Therapy: Mobilization, MFR, MLD, friction massage minutes (21877) 25   Skilled Intervention MT: METS, STM, Mobs   Patient Response Neutral pelvic position. Pt sore on (R) lumbar paraspinals (\"in my muscles\"), with (L) sacrotuberous lig.   Treatment Detail Supine METs to correct (L) ilium anterior rotation.  Did not fully correct, so did METS to correct (R) posterior ilium rotation.  Symph Pub METs to set the corrected position.  Prone for (L) sacrotuberous release, diagonal sacral rocking, sacral base distraction, UPA's Lumbar, and STM to (L) glute med and piriformis mms.   Plan   Homework [id number]   Home program Hold regular hip strengthening exers and add current \"butt burners\" exer that pt completed in session today.   Plan for next session Cont strengthening, assess efficacy of current HEP.  MT prn to assist with pain control.  Cont to see pt every 2 weeks for up to 3 more sessions.   Pelvic Health Only: Informed Consent   Pelvic Health Informed Consent Statement Discussed with patient/guardian reason for referral regarding pelvic health needs and external/internal pelvic floor muscle examination.  Opportunity provided to ask questions and verbal consent for assessment and intervention was given.   Total Session Time   Timed Code Treatment Minutes 50 (2TE,2MT)   Total Treatment Time (sum of timed and untimed " services) 50 (2TE,2MT)   Thank you for the referral of this patient.  Silvia Yo, PT, MA  #[id number]

## 2022-06-20 ENCOUNTER — MYC REFILL (OUTPATIENT)
Dept: FAMILY MEDICINE | Facility: CLINIC | Age: 27
End: 2022-06-20
Payer: COMMERCIAL

## 2022-06-20 DIAGNOSIS — G43.009 MIGRAINE WITHOUT AURA AND WITHOUT STATUS MIGRAINOSUS, NOT INTRACTABLE: ICD-10-CM

## 2022-06-20 DIAGNOSIS — F41.9 ANXIETY: ICD-10-CM

## 2022-06-20 DIAGNOSIS — G43.809 OTHER MIGRAINE WITHOUT STATUS MIGRAINOSUS, NOT INTRACTABLE: ICD-10-CM

## 2022-06-20 RX ORDER — HYDROXYZINE HYDROCHLORIDE 25 MG/1
TABLET, FILM COATED ORAL
Qty: 30 TABLET | Refills: 0 | Status: SHIPPED | OUTPATIENT
Start: 2022-06-20 | End: 2022-11-17

## 2022-06-20 RX ORDER — SUMATRIPTAN 50 MG/1
TABLET, FILM COATED ORAL
Qty: 10 TABLET | Refills: 0 | Status: SHIPPED | OUTPATIENT
Start: 2022-06-20 | End: 2022-07-06

## 2022-06-20 RX ORDER — RIZATRIPTAN BENZOATE 5 MG/1
5 TABLET, ORALLY DISINTEGRATING ORAL
Qty: 10 TABLET | Refills: 0 | Status: SHIPPED | OUTPATIENT
Start: 2022-06-20 | End: 2022-09-26

## 2022-06-20 RX ORDER — SUMATRIPTAN 50 MG/1
50 TABLET, FILM COATED ORAL
Qty: 10 TABLET | Refills: 0 | Status: SHIPPED | OUTPATIENT
Start: 2022-06-20 | End: 2022-11-17

## 2022-06-20 RX ORDER — HYDROXYZINE HYDROCHLORIDE 25 MG/1
25 TABLET, FILM COATED ORAL EVERY 6 HOURS PRN
Qty: 30 TABLET | Refills: 0 | Status: SHIPPED | OUTPATIENT
Start: 2022-06-20 | End: 2022-07-06

## 2022-06-20 NOTE — TELEPHONE ENCOUNTER
Routing refill request to provider for review/approval because:  Drug not on the [facility] refill protocol   Thank you.  Suresh Ortiz RN

## 2022-06-20 NOTE — TELEPHONE ENCOUNTER
Routing refill request to provider for review/approval because:  PCP to determine refills.  Haritha Thornton RN

## 2022-06-24 ENCOUNTER — E-VISIT (OUTPATIENT)
Dept: FAMILY MEDICINE | Facility: CLINIC | Age: 27
End: 2022-06-24
Payer: COMMERCIAL

## 2022-06-24 DIAGNOSIS — H01.001 BLEPHARITIS OF RIGHT UPPER EYELID, UNSPECIFIED TYPE: Primary | ICD-10-CM

## 2022-06-24 PROCEDURE — 99421 OL DIG E/M SVC 5-10 MIN: CPT | Performed by: FAMILY MEDICINE

## 2022-06-24 RX ORDER — ERYTHROMYCIN 5 MG/G
0.5 OINTMENT OPHTHALMIC 3 TIMES DAILY
Qty: 1 G | Refills: 0 | Status: SHIPPED | OUTPATIENT
Start: 2022-06-24 | End: 2022-07-06

## 2022-06-24 NOTE — TELEPHONE ENCOUNTER
All placed to Driver,relayed duration of eye ointment is 5 days per Dr Shepard.  Haritha Thornton RN

## 2022-06-24 NOTE — TELEPHONE ENCOUNTER
[facility] pharmacist Evon called requesting clarification of duration of treatment for the erythromycin eye ointment ordered today.  Will forward to Dr Torres to address.  Call back number [phone].  Haritha Thornton RN

## 2022-06-27 ENCOUNTER — HOSPITAL ENCOUNTER (OUTPATIENT)
Dept: PHYSICAL THERAPY | Facility: CLINIC | Age: 27
Setting detail: THERAPIES SERIES
Discharge: HOME | End: 2022-06-27
Attending: FAMILY MEDICINE
Payer: COMMERCIAL

## 2022-06-27 PROCEDURE — 97140 MANUAL THERAPY 1/> REGIONS: CPT | Mod: GP | Performed by: PHYSICAL THERAPIST

## 2022-06-27 PROCEDURE — 97110 THERAPEUTIC EXERCISES: CPT | Mod: GP | Performed by: PHYSICAL THERAPIST

## 2022-07-06 ENCOUNTER — VIRTUAL VISIT (OUTPATIENT)
Dept: FAMILY MEDICINE | Facility: CLINIC | Age: 27
End: 2022-07-06
Payer: COMMERCIAL

## 2022-07-06 DIAGNOSIS — F98.8 ATTENTION DEFICIT DISORDER (ADD) WITHOUT HYPERACTIVITY: ICD-10-CM

## 2022-07-06 DIAGNOSIS — F51.5 NIGHTMARES ASSOCIATED WITH CHRONIC POST-TRAUMATIC STRESS DISORDER: ICD-10-CM

## 2022-07-06 DIAGNOSIS — F41.9 ANXIETY: ICD-10-CM

## 2022-07-06 DIAGNOSIS — R00.2 PALPITATIONS: Primary | ICD-10-CM

## 2022-07-06 DIAGNOSIS — F43.12 NIGHTMARES ASSOCIATED WITH CHRONIC POST-TRAUMATIC STRESS DISORDER: ICD-10-CM

## 2022-07-06 PROCEDURE — 99214 OFFICE O/P EST MOD 30 MIN: CPT | Mod: 95 | Performed by: FAMILY MEDICINE

## 2022-07-06 RX ORDER — DEXTROAMPHETAMINE SACCHARATE, AMPHETAMINE ASPARTATE, DEXTROAMPHETAMINE SULFATE AND AMPHETAMINE SULFATE 1.25; 1.25; 1.25; 1.25 MG/1; MG/1; MG/1; MG/1
5 TABLET ORAL 2 TIMES DAILY
Qty: 60 TABLET | Refills: 0 | Status: SHIPPED | OUTPATIENT
Start: 2022-07-06 | End: 2022-08-22

## 2022-07-06 RX ORDER — ALPRAZOLAM 0.5 MG
0.5 TABLET ORAL 3 TIMES DAILY PRN
Qty: 10 TABLET | Refills: 0 | Status: SHIPPED | OUTPATIENT
Start: 2022-07-06 | End: 2022-11-08

## 2022-07-06 RX ORDER — PRAZOSIN HYDROCHLORIDE 2 MG/1
4-6 CAPSULE ORAL AT BEDTIME
Qty: 270 CAPSULE | Refills: 1 | Status: SHIPPED | OUTPATIENT
Start: 2022-07-06 | End: 2022-12-08

## 2022-07-06 RX ORDER — PROPRANOLOL HYDROCHLORIDE 20 MG/1
20 TABLET ORAL 2 TIMES DAILY
Qty: 180 TABLET | Refills: 3 | Status: SHIPPED | OUTPATIENT
Start: 2022-07-06 | End: 2023-04-24

## 2022-07-06 NOTE — PROGRESS NOTES
"Britney is a 26 year old who is being evaluated via a billable video visit.      How would you like to obtain your AVS? [employer]  If the video visit is dropped, the invitation should be resent by: Text to cell phone: [phone]  Will anyone else be joining your video visit? No       -------------------------    Assessment/Plan:    Britney Singh is a 26 year old female presenting for:    Palpitations  It is unfortunate that her Adderall causes palpitations.  Propranolol seems to be helpful.  We will send a slightly increased dose to the pharmacy.  Discussed the most common side effects of the medication.  Pertaining to her I think the most common side effect would be blood pressure that is too low and I would recommend that she monitor closely.  - propranolol (INDERAL) 20 MG tablet  Dispense: 180 tablet; Refill: 3    Nightmares associated with chronic post-traumatic stress disorder  Increased dose of Minipress sent to the pharmacy.  She will be using 4 to 5 mg at night.  Discussed that I would not increase this as well as increasing propranolol at the same time.  She will do this in a stepwise fashion.  - prazosin (MINIPRESS) 2 MG capsule  Dispense: 270 capsule; Refill: 1    Anxiety  Refill Xanax to the pharmacy.  Patient uses this sparingly  - ALPRAZolam (XANAX) 0.5 MG tablet  Dispense: 10 tablet; Refill: 0    Attention deficit disorder (ADD) without hyperactivity  Refill of Adderall sent.  Patient uses 5 mg immediate release as a \"boost\" and she is option to use this once or twice a day.  She will continue her 15 mg extended release.  - amphetamine-dextroamphetamine (ADDERALL) 5 MG tablet  Dispense: 60 tablet; Refill: 0        Medications Discontinued During This Encounter   Medication Reason     amphetamine-dextroamphetamine (ADDERALL XR) 15 MG 24 hr capsule      erythromycin (ROMYCIN) 5 MG/GM ophthalmic ointment      hydrOXYzine (ATARAX) 25 MG tablet      SUMAtriptan (IMITREX) 50 MG tablet      propranolol " (INDERAL) 10 MG tablet      prazosin (MINIPRESS) 1 MG capsule      ALPRAZolam (XANAX) 0.5 MG tablet Reorder     amphetamine-dextroamphetamine (ADDERALL) 5 MG tablet Reorder           Chief Complaint:  Recheck Medication          Subjective:   Britney Singh is a pleasant 26 year old female being evaluated via video visit today for the following concern/s:     ADHD: Patient has a history of ADHD.  She is recently started on Adderall which has been working very well for her.  She finds that this is very beneficial in helping her concentrate, get things done and feel less anxious.  Recently we increased her dose which she states is working better however she unfortunately has been having some increasing palpitations.  She is already on propranolol 10 mg once or twice a day but wonders about increasing that slightly.  She finds that it works well to help with her palpitations.    She additionally suffers from nightmares.  She is currently on Minipress and finds that the medication is helpful although recently it has been slightly less helpful.  She wonders about increasing the dose slightly.    History of ADHD as stated above.  On Adderall extended release 15 mg in the morning 5 mg immediate release in the afternoon.  Wonders about an extra dose of immediate release on days that she feels as though she needs it/states she feels as though she is to concentrate for longer periods of time.      12 point review of systems completed and negative except for what has been described above    History   Smoking Status     Former Smoker     Packs/day: 0.00     Years: 0.00     Quit date: 6/7/2012   Smokeless Tobacco     Never Used         Current Outpatient Medications:      albuterol (PROAIR HFA/PROVENTIL HFA/VENTOLIN HFA) 108 (90 Base) MCG/ACT inhaler, Inhale 2 puffs into the lungs every 6 hours as needed, Disp: , Rfl:      ALPRAZolam (XANAX) 0.5 MG tablet, Take 1 tablet (0.5 mg) by mouth 3 times daily as needed for anxiety,  Disp: 10 tablet, Rfl: 0     amphetamine-dextroamphetamine (ADDERALL XR) 15 MG 24 hr capsule, Take 1 capsule (15 mg) by mouth daily, Disp: 30 capsule, Rfl: 0     amphetamine-dextroamphetamine (ADDERALL) 5 MG tablet, Take 1 tablet (5 mg) by mouth 2 times daily, Disp: 60 tablet, Rfl: 0     DEBLITANE 0.35 MG tablet, Take 0.35 mg by mouth daily, Disp: , Rfl:      Diaphragm Arc-Spring (CAYA) DPRH, Place 1 Units vaginally daily as needed, Disp: 1 each, Rfl: 1     hydrOXYzine (ATARAX) 25 MG tablet, TAKE 1 TABLET (25 MG) BY MOUTH EVERY 6 HOURS AS NEEDED FOR ITCHING AND TAKE1 TABLET BY MOUTH THREETIMES DAILY AS NEEDED FOR ANXIETY, Disp: 30 tablet, Rfl: 0     ondansetron (ZOFRAN) 4 MG tablet, Take 1 tablet (4 mg) by mouth 3 times daily as needed, Disp: 30 tablet, Rfl: 1     prazosin (MINIPRESS) 2 MG capsule, Take 2-3 capsules (4-6 mg) by mouth At Bedtime, Disp: 270 capsule, Rfl: 1     propranolol (INDERAL) 20 MG tablet, Take 1 tablet (20 mg) by mouth 2 times daily, Disp: 180 tablet, Rfl: 3     rizatriptan (MAXALT-MLT) 5 MG ODT, Take 1 tablet (5 mg) by mouth at onset of headache for migraine May repeat in 2 hours. Max 6 tablets/24 hours., Disp: 10 tablet, Rfl: 0     SUMAtriptan (IMITREX) 50 MG tablet, Take 1 tablet (50 mg) by mouth at onset of headache for migraine May repeat in 2 hours - max 4 tabs/day, Disp: 10 tablet, Rfl: 0     tiZANidine (ZANAFLEX) 2 MG tablet, Take 0.5-2 tablets (1-4 mg) by mouth 3 times daily, Disp: 90 tablet, Rfl: 1        Objective:  No vitals were done due to the nature of this visit  No flowsheet data found.      General: No acute distress  Psych: Appropriate affect  HEENT: moist mucous membranes  Pulmonary: Breathing comfortably, speaking in complete sentences  Extremities: warm and well perfused with no edema  Skin: warm and dry with no rash         This note has been dictated and transcribed using voice recognition software.   Any errors in transcription are unintentional and inherent to the  software.      Video-Visit Details    Video Start Time: 12:47 PM    Type of service:  Video Visit    Video End Time:1:08 PM    Originating Location (pt. Location): Home    Distant Location (provider location):  [facility]     Platform used for Video Visit: [employer]  ..  Answers for HPI/ROS submitted by the patient on 6/29/2022  What is the reason for your visit today? : Med check  How many servings of fruits and vegetables do you eat daily?: 4 or more  On average, how many sweetened beverages do you drink each day (Examples: soda, juice, sweet tea, etc.  Do NOT count diet or artificially sweetened beverages)?: 0  How many minutes a day do you exercise enough to make your heart beat faster?: 30 to 60  How many days a week do you exercise enough to make your heart beat faster?: 5  How many days per week do you miss taking your medication?: 0

## 2022-07-14 ENCOUNTER — HOSPITAL ENCOUNTER (OUTPATIENT)
Dept: PHYSICAL THERAPY | Facility: CLINIC | Age: 27
Setting detail: THERAPIES SERIES
Discharge: HOME | End: 2022-07-14
Attending: FAMILY MEDICINE
Payer: COMMERCIAL

## 2022-07-14 PROCEDURE — 97140 MANUAL THERAPY 1/> REGIONS: CPT | Mod: GP | Performed by: PHYSICAL THERAPIST

## 2022-07-14 PROCEDURE — 97110 THERAPEUTIC EXERCISES: CPT | Mod: GP | Performed by: PHYSICAL THERAPIST

## 2022-07-15 NOTE — PROGRESS NOTES
"Physical Therapy Progress Note and Medicare RECERTIFICATION    Britney Singh  1995    Session Number: 19 since start of care.    Reasons for Continuing Treatment:   Pt states she is not able to relieve the \"tension\" that she is getting in Low Back and Hips. Pt could benefit from skilled PT for MT to assist in pain relief and show pt that her HEP is going to cont to help her self-relieve this symptom.      Frequency/Duration  1 times per week for 4 weeks for a total of 2 visits.    Medical Diagnosis: Low Back and Pelvic Pain    Onset Date:8/24/21    Start of Care Date: 9/2/21    Recertification Period  7/19/22 - 8/19/22    Physician Signature:    Date:    X_______________________________________________________    Physician Name: Anushka Arnold MD    I certify the need for these services furnished under this plan of treatment and while under my care. Physician co-signature of this document indicates review and certification of the therapy plan.  This signature may be written on paper, or electronically signed within EPIC.          07/14/22 1100   Signing Clinician's Name / Credentials   Signing clinician's name / credentials Silvia Yo, PT MA #[id number]   Session Number   Session Number 19   Authorization status Onset: 8/24/21, SOC: 9/2/21   Progress Note/Recertification   Progress Note Due Date 07/19/22   Progress Note Completed Date 07/14/22   Recertification Due Date 07/19/22   Ortho Goal 1   Goal Identifier STG 1   Goal Description Pt will report hip pain no greater than 6/10   Goal Progress Not Met: avg is 6--7/10 at (B) hips and tailbone.  (cont for 3 weeks)   Target Date 08/04/22   Ortho Goal 2   Goal Identifier LTG 1   Goal Description Pt will report hip pain no greater than 3/10   Goal Progress See goal #1  (cont for 6,  more weeks, may be on own with HEP.)   Target Date 08/25/22   Ortho Goal 3   Goal Identifier STG 2   Goal Description Pt will report no urine leaking for 2 weeks for improved " "continence   Goal Progress Met: no leaking and has not tried jumping. Has not been able to do any jump roping.    Target Date 03/08/22   Date Met 03/08/22   Ortho Goal 4   Goal Identifier LTG 2   Goal Description Pt will report no urine leaking for one month for improved continence   Goal Progress Met: for daily activities, but not tried with Jump Rope Workout.   Target Date 03/08/22   Date Met 03/08/22   Ortho Goal 5   Goal Identifier STG 3   Goal Description Pt will improve general LE and pelvic floor strength to at least 3+/5 for improved muscle performance and decreased pain   Target Date 10/14/21   Date Met 10/22/21   Ortho Goal 6   Goal Identifier LTG 3   Goal Description Pt will improve general LE and pelvic floor strength to at least 4/5 for improved muscle performance and decreased pain   Goal Progress Not Met: pt is making progress as seen with greater ability to hold trunk stable with exers, but still weak.  (cont x 4 weeks)   Target Date 08/11/22   Subjective Report   Subjective Report Feeling like the exercises are getting easier, and LBP is okay; but feels the muscles around the hip/LB are more tense.   Objective Measure 1   Objective Measure Bowel/Bladder   Details Mildly feeling like she is getting more constipated.   Objective Measure 2   Objective Measure Strength   Details (B) Gluteals: 4/5, core \"improved\" as observed with ability to hold trunk stable during \"butt burners.\"   Objective Measure 3   Objective Measure Pain   Details 6-7/10 pain, points to LB and SIJ (R) > (L)   Objective Measure 4   Objective Measure Pelvic Alignment   Details (L) ilium posteriorly rotated, mild transverse torsion with (R) ASIS prominence.   Therapeutic Procedure/exercise   Therapeutic Procedures: strength, endurance, ROM, flexibillity minutes (44996) 20   Skilled Intervention Exer: stretching, strengthening; porgression of HEP   Patient Response Pt able to demo exer correctly. No increase in pain with exers today. " "  Treatment Detail Reviewed kneeling HF and butt burners.  Suggested use of PFM contract and relax to see if can have an effect on pain and then to capitlaize on whatever is making the tension and pain feel relief. Reminded/cued pt with all the strengthening exers she should be trying to minimize trunk \"wobbling\", ie: butt burners, bridging. Completed 10 bridges with thumbs up on ASIS's to self-assess for hip drop and correct.   Manual Therapy   Manual Therapy: Mobilization, MFR, MLD, friction massage minutes (85366) 40   Skilled Intervention MT: METs, mobs, STM   Patient Response Detected ERS (R) at L3 and ERS (L) at L2 after corrected pelvis to neutral position.   Treatment Detail Supine METs to correct (L) ilium rotation.  Shotgun technqieue to correct transverse torsion.  Symph Pub METs to set the corrected position. Seated METS to correct ERS (R) at L3 and (L) at L2. Prone for Sacral diagonal rocking and sacrotuberous release. UPAs through lumbar spine working least painful to most painful.  STM to (R) lumbar paraspinals to reduce residual tension after all METs and Mobs.   Plan   Homework [id number]   Home program Pt knows HEP well. No new exers added.   Plan for next session See pt in 2 weeks. Discussed that next session may be last as pt has HEP and can make gains on her own at this time.   Pelvic Health Only: Informed Consent   Pelvic Health Informed Consent Statement Discussed with patient/guardian reason for referral regarding pelvic health needs and external/internal pelvic floor muscle examination.  Opportunity provided to ask questions and verbal consent for assessment and intervention was given.   Total Session Time   Timed Code Treatment Minutes 60 (TE,3MT)   Total Treatment Time (sum of timed and untimed services) 60 (TE,3MT)   Thank you for the referral of this patient.  Silvia Yo, PT, MA  #[id number]    "

## 2022-07-20 DIAGNOSIS — Z00.00 HEALTHCARE MAINTENANCE: Primary | ICD-10-CM

## 2022-07-20 RX ORDER — NORETHINDRONE
KIT
Qty: 90 TABLET | Refills: 2 | Status: SHIPPED | OUTPATIENT
Start: 2022-07-20 | End: 2023-11-20

## 2022-07-20 NOTE — TELEPHONE ENCOUNTER
Routing refill request to provider for review/approval because:  Medication is reported/historical    Maximino Clemente RN

## 2022-07-28 ENCOUNTER — VIRTUAL VISIT (OUTPATIENT)
Dept: FAMILY MEDICINE | Facility: CLINIC | Age: 27
End: 2022-07-28
Payer: COMMERCIAL

## 2022-07-28 DIAGNOSIS — U07.1 INFECTION DUE TO 2019 NOVEL CORONAVIRUS: Primary | ICD-10-CM

## 2022-07-28 PROCEDURE — 99213 OFFICE O/P EST LOW 20 MIN: CPT | Mod: 95 | Performed by: PHYSICIAN ASSISTANT

## 2022-07-28 RX ORDER — GUAIFENESIN AND DEXTROMETHORPHAN HYDROBROMIDE 1200; 60 MG/1; MG/1
1 TABLET, EXTENDED RELEASE ORAL 2 TIMES DAILY
Qty: 28 TABLET | Refills: 0 | Status: SHIPPED | OUTPATIENT
Start: 2022-07-28 | End: 2022-09-13

## 2022-07-28 RX ORDER — CETIRIZINE HYDROCHLORIDE, PSEUDOEPHEDRINE HYDROCHLORIDE 5; 120 MG/1; MG/1
1 TABLET, FILM COATED, EXTENDED RELEASE ORAL 2 TIMES DAILY
Qty: 24 TABLET | Refills: 0 | Status: SHIPPED | OUTPATIENT
Start: 2022-07-28 | End: 2022-09-13

## 2022-07-28 ASSESSMENT — PATIENT HEALTH QUESTIONNAIRE - PHQ9
10. IF YOU CHECKED OFF ANY PROBLEMS, HOW DIFFICULT HAVE THESE PROBLEMS MADE IT FOR YOU TO DO YOUR WORK, TAKE CARE OF THINGS AT HOME, OR GET ALONG WITH OTHER PEOPLE: VERY DIFFICULT
SUM OF ALL RESPONSES TO PHQ QUESTIONS 1-9: 12
SUM OF ALL RESPONSES TO PHQ QUESTIONS 1-9: 12

## 2022-07-28 NOTE — PROGRESS NOTES
Britney is a 26 year old who is being evaluated via a billable video visit.      How would you like to obtain your AVS? MyChart  If the video visit is dropped, the invitation should be resent by: Text to cell phone: [phone]  Will anyone else be joining your video visit? No        Assessment & Plan   Problem List Items Addressed This Visit    None     Visit Diagnoses     Infection due to 2019 novel coronavirus    -  Primary    Relevant Medications    nirmatrelvir and ritonavir (PAXLOVID) therapy pack    Dextromethorphan-Guaifenesin  MG TB12    cetirizine-pseudoePHEDrine ER (ZYRTEC-D) 5-120 MG 12 hr tablet         Paxlovid-use secondary birth control while on this medication , do not take Xanax   mucinex Dm and Zyrtec 10 for symptomatic relief of symptoms  15 minutes spent on the date of the encounter doing chart review, history and exam, documentation and further activities per the note  If symptoms worsen, go to ED      Return in about 1 week (around 8/4/2022), or if symptoms worsen or fail to improve.    JORGE Berrios [facility]   Britney is a 26 year old, presenting for the following health issues:  Covid Positive       HPI       COVID-19 Symptom Review  How many days ago did these symptoms start? Monday night. Positive yesterday    Are any of the following symptoms significant for you?    New or worsening difficulty breathing? No    Worsening cough? Yes, it's a dry cough.     Fever or chills? Yes, 100.5F    Headache: YES    Sore throat: YES    Chest pain: YES    Diarrhea: No    Body aches? YES    What treatments has patient tried? Acetaminophen   Does patient live in a nursing home, group home, or shelter? No  Does patient have a way to get food/medications during quarantined? Yes, I have a friend or family member who can help me.            Review of Systems   Constitutional, HEENT, cardiovascular, pulmonary, gi and gu systems are negative,  except as otherwise noted.      Objective    Vitals - Patient Reported  Temperature (Patient Reported): 99.9  F (37.7  C)  Pain Score: Severe Pain (7)  Pain Loc: Chest      Vitals:  No vitals were obtained today due to virtual visit.    Physical Exam   GENERAL: Healthy, alert and no distress  EYES: Eyes grossly normal to inspection.  No discharge or erythema, or obvious scleral/conjunctival abnormalities.  RESP: audible cough, no wheezing or diffuculty breathing   SKIN: Visible skin clear. No significant rash, abnormal pigmentation or lesions.  NEURO: Cranial nerves grossly intact.  Mentation and speech appropriate for age.  PSYCH: Mentation appears normal, affect normal/bright, judgement and insight intact, normal speech and appearance well-groomed.                Video-Visit Details    Video Start Time: 5:20 PM    Type of service:  Video Visit    Video End Time:5:30 PM    Originating Location (pt. Location): Home    Distant Location (provider location):  [facility]     Platform used for Video Visit: [employer]    .  ..  Answers for HPI/ROS submitted by the patient on 7/28/2022  If you checked off any problems, how difficult have these problems made it for you to do your work, take care of things at home, or get along with other people?: Very difficult  PHQ9 TOTAL SCORE: 12

## 2022-08-18 ENCOUNTER — TRANSFERRED RECORDS (OUTPATIENT)
Dept: HEALTH INFORMATION MANAGEMENT | Facility: CLINIC | Age: 27
End: 2022-08-18

## 2022-08-22 DIAGNOSIS — F98.8 ATTENTION DEFICIT DISORDER (ADD) WITHOUT HYPERACTIVITY: ICD-10-CM

## 2022-08-22 RX ORDER — DEXTROAMPHETAMINE SACCHARATE, AMPHETAMINE ASPARTATE, DEXTROAMPHETAMINE SULFATE AND AMPHETAMINE SULFATE 1.25; 1.25; 1.25; 1.25 MG/1; MG/1; MG/1; MG/1
TABLET ORAL
Qty: 60 TABLET | Refills: 0 | Status: SHIPPED | OUTPATIENT
Start: 2022-08-22 | End: 2022-12-12

## 2022-08-24 DIAGNOSIS — R00.2 PALPITATIONS: ICD-10-CM

## 2022-08-24 RX ORDER — PROPRANOLOL HYDROCHLORIDE 20 MG/1
20 TABLET ORAL 2 TIMES DAILY
Qty: 180 TABLET | Refills: 3 | OUTPATIENT
Start: 2022-08-24

## 2022-08-29 ENCOUNTER — HOSPITAL ENCOUNTER (OUTPATIENT)
Dept: PHYSICAL THERAPY | Facility: CLINIC | Age: 27
Setting detail: THERAPIES SERIES
Discharge: HOME | End: 2022-08-29
Attending: FAMILY MEDICINE
Payer: COMMERCIAL

## 2022-08-29 PROCEDURE — 97110 THERAPEUTIC EXERCISES: CPT | Mod: GP | Performed by: PHYSICAL THERAPIST

## 2022-08-29 PROCEDURE — 97140 MANUAL THERAPY 1/> REGIONS: CPT | Mod: GP | Performed by: PHYSICAL THERAPIST

## 2022-08-30 NOTE — PROGRESS NOTES
Physical Therapy Progress Note and Medicare RECERTIFICATION    Britney Singh  1995    Session Number: 20 since start of care.    Reasons for Continuing Treatment:   Pt has had a set back when she got Covid 4 weeks ago, and had to cx appts and could not do her HEP d/t fatigue. Pt could benefit from skilled PT to establish HEP, and to use MT to assist with pain control.     Frequency/Duration  1 times per every other week for 4 weeks for a total of 2 visits.    Medical Diagnosis: Pelvic floor dysfunction, LBP    Onset Date:8/24/21    Start of Care Date: 9/2/21    Recertification Period  8/19/22 - 9/26/22    Physician Signature:    Date:    X_______________________________________________________    Physician Name: Anushka Arnold MD    I certify the need for these services furnished under this plan of treatment and while under my care. Physician co-signature of this document indicates review and certification of the therapy plan.  This signature may be written on paper, or electronically signed within EPIC.        08/29/22 0900   Signing Clinician's Name / Credentials   Signing clinician's name / credentials Silvia Yo, PT MA #[id number]   Session Number   Session Number 20   Authorization status Onset: 8/24/21, SOC: 9/2/21   Progress Note/Recertification   Progress Note Due Date 09/26/22   Progress Note Completed Date 08/29/22   Recertification Due Date 09/26/22   Ortho Goal 1   Goal Identifier STG 1   Goal Description Pt will report hip pain no greater than 6/10   Goal Progress Not Met: pt states pain was better for a while and then she got Covid and could not do her exers and pain returned.  (cont for 2 weeks)   Target Date 09/12/22   Ortho Goal 2   Goal Identifier LTG 1   Goal Description Pt will report hip pain no greater than 3/10   Goal Progress See goal #1  (cont for 6,  more weeks, may be on own with HEP.)   Target Date 09/26/22   Ortho Goal 3   Goal Identifier STG 2   Goal Description Pt will  "report no urine leaking for 2 weeks for improved continence   Goal Progress Met: pt states she has not had any leaking.   Target Date 03/08/22   Date Met 03/08/22   Ortho Goal 4   Goal Identifier LTG 2   Goal Description Pt will report no urine leaking for one month for improved continence   Goal Progress Met: for daily activities, but not tried with Jump Rope Workout.   Target Date 03/08/22   Date Met 03/08/22   Ortho Goal 5   Goal Identifier STG 3   Goal Description Pt will improve general LE and pelvic floor strength to at least 3+/5 for improved muscle performance and decreased pain   Target Date 10/14/21   Date Met 08/29/22   Goal Progress Met: pt is at 4/5 in general LE strength with gross motor mvmts (B).   Ortho Goal 6   Goal Identifier LTG 3   Goal Description Pt will improve general LE and pelvic floor strength to at least 4/5 for improved muscle performance and decreased pain   Goal Progress Met: pt is at 4/5 in general LE strength with gross motor mvmts today  (cont x 4 weeks)   Target Date 08/11/22   Date Met 08/29/22   Subjective Report   Subjective Report Pt contracted Covid-19 and states this made her tension worse.  Pt was wiped out for 2 weeks, and unable to do exers. Reports her heart rate was very sporadic and elevated at times.  Her O2 sat was 80-90%.  She was unable to do much for exers as well.  States (R) hip and SIJ are more sore and feeling \"tense.\"   Objective Measure 1   Objective Measure Bowel/Bladder   Details Did not comment on constipation today.  States no leaking.   Objective Measure 2   Objective Measure Strength   Details 4/5 in hip flexors and extensors (B) today.   Objective Measure 3   Objective Measure Pain   Details 6-7/10 in (R) Lumbar/SIJ region.   Objective Measure 4   Objective Measure Pelvic Alignment   Details (L) ilium posteriorly rotated   Therapeutic Procedure/exercise   Therapeutic Procedures: strength, endurance, ROM, flexibillity minutes (53569) 15   Skilled " "Intervention Exer: stretching,strengthening, core use; progression of HEP   Patient Response Pt using core muscles more during exers with less cues.   Treatment Detail Reviewed \"butt burners\" and encouraged pt to work to get daily completion. Cued to keep hips stable, \"don't let them move if you can help it.\"  Reviewed and tried to make it easy to do her stretching - offering alternative of standing poisitons for pirifromis and seated postiion for HS stretching.   Manual Therapy   Manual Therapy: Mobilization, MFR, MLD, friction massage minutes (44024) 25   Skilled Intervention MT: METs, mobs, STM   Patient Response Neutral pelvic position. States \"I felt like I really needed to come in today\" to get treated.   Treatment Detail Supine METs to correct (L) ilium rotation.  Shotgun technqieue to correct transverse torsion.  Symph Pub METs to set the corrected position.  Prone for Sacral diagonal rocking and sacrotuberous release. UPAs through lumbar spine working least painful to most painful.   Plan   Homework [id number]   Home program Cont with current HEP as pt has not been able to get back up to doing all exers since having Covid.   Plan for next session See pt in 2 weeks. Discussed that will put on hold with pt doing HEP on her own at that time.  May need to return to MD for further diagnostics if not able to \"stay well\" with her HEP.   Pelvic Health Only: Informed Consent   Pelvic Health Informed Consent Statement Discussed with patient/guardian reason for referral regarding pelvic health needs and external/internal pelvic floor muscle examination.  Opportunity provided to ask questions and verbal consent for assessment and intervention was given.   Total Session Time   Timed Code Treatment Minutes 40 (TE,2MT)   Total Treatment Time (sum of timed and untimed services) 40 (TE,2MT)   Thank you for the referral of this patient.  Silvia Yo, PT, MA  #[id number]    "

## 2022-09-06 ASSESSMENT — ASTHMA QUESTIONNAIRES
QUESTION_1 LAST FOUR WEEKS HOW MUCH OF THE TIME DID YOUR ASTHMA KEEP YOU FROM GETTING AS MUCH DONE AT WORK, SCHOOL OR AT HOME: NONE OF THE TIME
QUESTION_4 LAST FOUR WEEKS HOW OFTEN HAVE YOU USED YOUR RESCUE INHALER OR NEBULIZER MEDICATION (SUCH AS ALBUTEROL): ONCE A WEEK OR LESS
QUESTION_2 LAST FOUR WEEKS HOW OFTEN HAVE YOU HAD SHORTNESS OF BREATH: ONCE OR TWICE A WEEK
QUESTION_3 LAST FOUR WEEKS HOW OFTEN DID YOUR ASTHMA SYMPTOMS (WHEEZING, COUGHING, SHORTNESS OF BREATH, CHEST TIGHTNESS OR PAIN) WAKE YOU UP AT NIGHT OR EARLIER THAN USUAL IN THE MORNING: ONCE OR TWICE
QUESTION_5 LAST FOUR WEEKS HOW WOULD YOU RATE YOUR ASTHMA CONTROL: WELL CONTROLLED
ACT_TOTALSCORE: 21

## 2022-09-13 ENCOUNTER — OFFICE VISIT (OUTPATIENT)
Dept: FAMILY MEDICINE | Facility: CLINIC | Age: 27
End: 2022-09-13
Payer: COMMERCIAL

## 2022-09-13 VITALS
BODY MASS INDEX: 26 KG/M2 | WEIGHT: 175.5 LBS | HEIGHT: 69 IN | DIASTOLIC BLOOD PRESSURE: 74 MMHG | SYSTOLIC BLOOD PRESSURE: 130 MMHG | OXYGEN SATURATION: 99 % | RESPIRATION RATE: 12 BRPM | TEMPERATURE: 97.9 F | HEART RATE: 88 BPM

## 2022-09-13 DIAGNOSIS — R00.2 PALPITATIONS: ICD-10-CM

## 2022-09-13 DIAGNOSIS — Z23 NEED FOR VACCINATION: ICD-10-CM

## 2022-09-13 DIAGNOSIS — L82.0 INFLAMED SEBORRHEIC KERATOSIS: ICD-10-CM

## 2022-09-13 DIAGNOSIS — U09.9 POST-COVID CHRONIC DECREASED MOBILITY AND ENDURANCE: Primary | ICD-10-CM

## 2022-09-13 DIAGNOSIS — Z74.09 POST-COVID CHRONIC DECREASED MOBILITY AND ENDURANCE: Primary | ICD-10-CM

## 2022-09-13 PROCEDURE — 93000 ELECTROCARDIOGRAM COMPLETE: CPT | Mod: 59 | Performed by: FAMILY MEDICINE

## 2022-09-13 PROCEDURE — 90471 IMMUNIZATION ADMIN: CPT | Performed by: FAMILY MEDICINE

## 2022-09-13 PROCEDURE — 90686 IIV4 VACC NO PRSV 0.5 ML IM: CPT | Performed by: FAMILY MEDICINE

## 2022-09-13 PROCEDURE — 17110 DESTRUCTION B9 LES UP TO 14: CPT | Performed by: FAMILY MEDICINE

## 2022-09-13 PROCEDURE — 99213 OFFICE O/P EST LOW 20 MIN: CPT | Mod: 25 | Performed by: FAMILY MEDICINE

## 2022-09-13 ASSESSMENT — ASTHMA QUESTIONNAIRES: ACT_TOTALSCORE: 21

## 2022-09-13 ASSESSMENT — PATIENT HEALTH QUESTIONNAIRE - PHQ9
SUM OF ALL RESPONSES TO PHQ QUESTIONS 1-9: 9
10. IF YOU CHECKED OFF ANY PROBLEMS, HOW DIFFICULT HAVE THESE PROBLEMS MADE IT FOR YOU TO DO YOUR WORK, TAKE CARE OF THINGS AT HOME, OR GET ALONG WITH OTHER PEOPLE: VERY DIFFICULT
SUM OF ALL RESPONSES TO PHQ QUESTIONS 1-9: 9

## 2022-09-13 ASSESSMENT — PAIN SCALES - GENERAL: PAINLEVEL: NO PAIN (0)

## 2022-09-13 NOTE — PROGRESS NOTES
"  Assessment & Plan     Post-COVID chronic decreased mobility and endurance  Hasn't been super long but seems mostly post-covid fatigue, deconditioning \"long covid\"  I did consider a myocarditis type picture but symptoms did not fit that well, I felt that holter reasonable to see if any events - ordered see below  - considered some labs but had a lot of normal workup over the past couple of years, did not feel new labs now would make any difference but can consider if ongoing without explanation  - EKG 12-lead complete w/read - Clinics    Inflamed seborrheic keratosis  See under exam, noted lesion was frozen with LNx3 and wound care reviewed, pt tolerated and had not questions.  - DESTRUCT BENIGN LESION, UP TO 14    Palpitations  - Adult Holter Monitor 48 hour    Need for vaccination  - INFLUENZA VACCINE IM > 6 MONTHS VALENT IIV4 (AFLURIA/FLUZONE)    Return if symptoms worsen or fail to improve, for Routine preventive sometime after 10/6.    Tomasa Del Valle MD  [facility]            Elvia Tan is a 26 year old accompanied by her self, presenting for the following health issues:    Mole (Right side of abdomen) and Health Maintenance (Advised patient of . Will get flu shot today.)    Chief Complaint   Patient presents with     Mole     Right side of abdomen     Health Maintenance     Advised patient of . Will get flu shot today.     Patient had COVID in late July and doesn't feel back to normal since. Chest discomfort, pulse increased, more shortness of breath than usual.    Initially just feels like she gets higher heart rate from minimal exertion and she gets short of breath.  She does feel some chest pain - points to her sternum  She has been holding back on her adderall because of some of these symptoms.  Overall just more of a general discomfort. She feels like she hasn't been able to do the same activities as before Covid.    History of Present Illness       Reason for " "visit:  Mole that's irritated  Symptom onset:  More than a month  Symptoms include:  Pain, discomfort, sometimes redness  Symptom intensity:  Mild  Symptom progression:  Staying the same  Had these symptoms before:  No    She eats 4 or more servings of fruits and vegetables daily.She consumes 0 sweetened beverage(s) daily.She exercises with enough effort to increase her heart rate 30 to 60 minutes per day.  She exercises with enough effort to increase her heart rate 5 days per week.   She is taking medications regularly.    Today's PHQ-9         PHQ-9 Total Score: 9    PHQ-9 Q9 Thoughts of better off dead/self-harm past 2 weeks :   Not at all    How difficult have these problems made it for you to do your work, take care of things at home, or get along with other people: Very difficult      Review of Systems   Constitutional, HEENT, cardiovascular, pulmonary, gi and gu systems are negative, except as otherwise noted.      Objective    /74 (BP Location: Right arm, Patient Position: Sitting, Cuff Size: Adult Regular)   Pulse 88   Temp 97.9  F (36.6  C) (Tympanic)   Resp 12   Ht 1.755 m (5' 9.09\")   Wt 79.6 kg (175 lb 8 oz)   LMP 08/29/2022   SpO2 99%   Breastfeeding No   BMI 25.85 kg/m    Body mass index is 25.85 kg/m .  Physical Exam   GENERAL: healthy, alert and no distress  CARD: normal PMI  RESP: normal respiratory effort, speaking in complete sentences. CTAB  MS: no gross musculoskeletal defects noted, no edema  SKIN: inflamed seb K lower R abdomen  PSYCH: mentation appears normal, affect normal/bright    EKG read by Tomasa Del Valle MD on 9/13/2022  Rate: Regular  Rhythm: Sinus  Axis: Normal   Intervals: Within normal ranges   Abnormalities: Non-specific RSR in V1, V2 but without RSR' morphology and not in more than 1 lead, also without ST nor T wave abnormalities.  Interpretation: Normal Sinus Rhythm   Results were communicated to the patient in clinic      Labs reviewed in Epic                "

## 2022-09-15 RX ORDER — PROPRANOLOL HYDROCHLORIDE 20 MG/1
20 TABLET ORAL 2 TIMES DAILY
Qty: 180 TABLET | Refills: 3 | OUTPATIENT
Start: 2022-09-15

## 2022-09-22 DIAGNOSIS — R00.2 PALPITATIONS: ICD-10-CM

## 2022-09-22 RX ORDER — PROPRANOLOL HYDROCHLORIDE 20 MG/1
20 TABLET ORAL 2 TIMES DAILY
Qty: 180 TABLET | Refills: 3 | OUTPATIENT
Start: 2022-09-22

## 2022-09-25 DIAGNOSIS — G43.009 MIGRAINE WITHOUT AURA AND WITHOUT STATUS MIGRAINOSUS, NOT INTRACTABLE: ICD-10-CM

## 2022-09-26 RX ORDER — RIZATRIPTAN BENZOATE 5 MG/1
5 TABLET, ORALLY DISINTEGRATING ORAL
Qty: 10 TABLET | Refills: 0 | Status: SHIPPED | OUTPATIENT
Start: 2022-09-26 | End: 2022-11-17

## 2022-09-26 NOTE — TELEPHONE ENCOUNTER
Prescription approved per [facility] Refill Protocol.  Suresh Ortiz RN

## 2022-09-27 ENCOUNTER — HOSPITAL ENCOUNTER (OUTPATIENT)
Dept: CARDIOLOGY | Facility: CLINIC | Age: 27
Discharge: HOME | End: 2022-09-27
Attending: FAMILY MEDICINE | Admitting: FAMILY MEDICINE
Payer: COMMERCIAL

## 2022-09-27 DIAGNOSIS — R00.2 PALPITATIONS: ICD-10-CM

## 2022-09-27 PROCEDURE — 93227 XTRNL ECG REC<48 HR R&I: CPT | Performed by: INTERNAL MEDICINE

## 2022-09-27 PROCEDURE — 93225 XTRNL ECG REC<48 HRS REC: CPT

## 2022-10-21 ENCOUNTER — HOSPITAL ENCOUNTER (EMERGENCY)
Facility: CLINIC | Age: 27
Discharge: HOME | End: 2022-10-21
Attending: NURSE PRACTITIONER | Admitting: NURSE PRACTITIONER
Payer: COMMERCIAL

## 2022-10-21 VITALS
TEMPERATURE: 98.2 F | SYSTOLIC BLOOD PRESSURE: 143 MMHG | OXYGEN SATURATION: 100 % | DIASTOLIC BLOOD PRESSURE: 77 MMHG | RESPIRATION RATE: 18 BRPM | HEART RATE: 87 BPM

## 2022-10-21 DIAGNOSIS — R21 RASH AND NONSPECIFIC SKIN ERUPTION: ICD-10-CM

## 2022-10-21 PROCEDURE — G0463 HOSPITAL OUTPT CLINIC VISIT: HCPCS | Performed by: NURSE PRACTITIONER

## 2022-10-21 PROCEDURE — 99213 OFFICE O/P EST LOW 20 MIN: CPT | Performed by: NURSE PRACTITIONER

## 2022-10-21 RX ORDER — MUPIROCIN 20 MG/G
OINTMENT TOPICAL 3 TIMES DAILY
Qty: 30 G | Refills: 0 | Status: SHIPPED | OUTPATIENT
Start: 2022-10-21 | End: 2022-12-05
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
## 2022-10-24 DIAGNOSIS — R00.2 PALPITATIONS: ICD-10-CM

## 2022-10-24 RX ORDER — PROPRANOLOL HYDROCHLORIDE 20 MG/1
20 TABLET ORAL 2 TIMES DAILY
Qty: 180 TABLET | Refills: 3 | Status: CANCELLED | OUTPATIENT
Start: 2022-10-24

## 2022-10-27 ENCOUNTER — E-VISIT (OUTPATIENT)
Dept: FAMILY MEDICINE | Facility: CLINIC | Age: 27
End: 2022-10-27
Payer: COMMERCIAL

## 2022-10-27 DIAGNOSIS — R05.9 COUGH, UNSPECIFIED TYPE: Primary | ICD-10-CM

## 2022-10-27 PROCEDURE — 99207 PR NON-BILLABLE SERV PER CHARTING: CPT | Performed by: FAMILY MEDICINE

## 2022-10-28 NOTE — PATIENT INSTRUCTIONS
Dear Britney Singh,    We are sorry you are not feeling well. Based on the responses you provided, it is recommended that you be seen in-person in urgent care so we can better evaluate your symptoms. Please click here to find the nearest urgent care location to you.   You will not be charged for this Visit. Thank you for trusting us with your care.    Anushka Arnold MD

## 2022-11-08 ENCOUNTER — OFFICE VISIT (OUTPATIENT)
Dept: FAMILY MEDICINE | Facility: CLINIC | Age: 27
End: 2022-11-08
Payer: COMMERCIAL

## 2022-11-08 VITALS
HEART RATE: 77 BPM | WEIGHT: 209.5 LBS | RESPIRATION RATE: 16 BRPM | OXYGEN SATURATION: 100 % | HEIGHT: 69 IN | BODY MASS INDEX: 31.03 KG/M2 | DIASTOLIC BLOOD PRESSURE: 68 MMHG | SYSTOLIC BLOOD PRESSURE: 122 MMHG | TEMPERATURE: 97.4 F

## 2022-11-08 DIAGNOSIS — Z00.00 HEALTHCARE MAINTENANCE: ICD-10-CM

## 2022-11-08 DIAGNOSIS — F41.9 ANXIETY: ICD-10-CM

## 2022-11-08 DIAGNOSIS — N76.0 VAGINITIS AND VULVOVAGINITIS: ICD-10-CM

## 2022-11-08 DIAGNOSIS — F43.10 POSTTRAUMATIC STRESS DISORDER: ICD-10-CM

## 2022-11-08 DIAGNOSIS — F33.1 MODERATE EPISODE OF RECURRENT MAJOR DEPRESSIVE DISORDER (H): Primary | ICD-10-CM

## 2022-11-08 DIAGNOSIS — F98.8 ATTENTION DEFICIT DISORDER (ADD) WITHOUT HYPERACTIVITY: ICD-10-CM

## 2022-11-08 PROCEDURE — 99395 PREV VISIT EST AGE 18-39: CPT | Mod: 25 | Performed by: FAMILY MEDICINE

## 2022-11-08 PROCEDURE — 91312 COVID-19,PF,PFIZER BOOSTER BIVALENT: CPT | Performed by: FAMILY MEDICINE

## 2022-11-08 PROCEDURE — 0124A COVID-19,PF,PFIZER BOOSTER BIVALENT: CPT | Performed by: FAMILY MEDICINE

## 2022-11-08 PROCEDURE — 99213 OFFICE O/P EST LOW 20 MIN: CPT | Mod: 25 | Performed by: FAMILY MEDICINE

## 2022-11-08 RX ORDER — METHYLPHENIDATE HYDROCHLORIDE 18 MG/1
18 TABLET ORAL EVERY MORNING
Qty: 30 TABLET | Refills: 0 | Status: SHIPPED | OUTPATIENT
Start: 2022-11-08 | End: 2022-12-12

## 2022-11-08 RX ORDER — METRONIDAZOLE 7.5 MG/G
1 GEL VAGINAL DAILY
Qty: 70 G | Refills: 0 | Status: SHIPPED | OUTPATIENT
Start: 2022-11-08 | End: 2023-04-24

## 2022-11-08 RX ORDER — ALPRAZOLAM 0.5 MG
0.5 TABLET ORAL 3 TIMES DAILY PRN
Qty: 10 TABLET | Refills: 0 | Status: SHIPPED | OUTPATIENT
Start: 2022-11-08 | End: 2022-12-10

## 2022-11-08 ASSESSMENT — ENCOUNTER SYMPTOMS
MYALGIAS: 1
WEAKNESS: 0
DIZZINESS: 0
CONSTIPATION: 0
CHILLS: 0
HEMATURIA: 0
PARESTHESIAS: 0
JOINT SWELLING: 0
HEMATOCHEZIA: 0
EYE PAIN: 0
SHORTNESS OF BREATH: 0
FEVER: 0
HEADACHES: 1
BREAST MASS: 0
SORE THROAT: 0
ABDOMINAL PAIN: 0
FREQUENCY: 0
DIARRHEA: 0
PALPITATIONS: 0
DYSURIA: 0
NERVOUS/ANXIOUS: 1
COUGH: 0
ARTHRALGIAS: 1
NAUSEA: 0
HEARTBURN: 0

## 2022-11-08 ASSESSMENT — PATIENT HEALTH QUESTIONNAIRE - PHQ9
SUM OF ALL RESPONSES TO PHQ QUESTIONS 1-9: 11
10. IF YOU CHECKED OFF ANY PROBLEMS, HOW DIFFICULT HAVE THESE PROBLEMS MADE IT FOR YOU TO DO YOUR WORK, TAKE CARE OF THINGS AT HOME, OR GET ALONG WITH OTHER PEOPLE: VERY DIFFICULT
SUM OF ALL RESPONSES TO PHQ QUESTIONS 1-9: 11

## 2022-11-08 NOTE — PROGRESS NOTES
"  Answers for HPI/ROS submitted by the patient on 11/8/2022  If you checked off any problems, how difficult have these problems made it for you to do your work, take care of things at home, or get along with other people?: Very difficult  PHQ9 TOTAL SCORE: 11  Frequency of exercise:: 4-5 days/week  Getting at least 3 servings of Calcium per day:: Yes  Diet:: Other  Taking medications regularly:: Yes  Medication side effects:: Other  Bi-annual eye exam:: Yes  Dental care twice a year:: Yes  Sleep apnea or symptoms of sleep apnea:: Sleep apnea  abdominal pain: No  Blood in stool: No  Blood in urine: No  chest pain: No  chills: No  congestion: No  constipation: No  cough: No  diarrhea: No  dizziness: No  ear pain: No  eye pain: No  nervous/anxious: Yes  fever: No  frequency: No  genital sores: No  headaches: Yes  hearing loss: No  heartburn: No  arthralgias: Yes  joint swelling: No  peripheral edema: No  mood changes: No  myalgias: Yes  nausea: No  dysuria: No  palpitations: No  Skin sensation changes: No  sore throat: No  urgency: No  rash: No  shortness of breath: No  visual disturbance: No  weakness: No  pelvic pain: No  vaginal bleeding: No  vaginal discharge: No  tenderness: No  breast mass: No  breast discharge: No  Additional concerns today:: Yes  Duration of exercise:: 30-45 minutes        Assessment/Plan:     Health maintenance female exam.  All questions answered.  Breast self exam technique reviewed and patient encouraged to perform self-exam monthly.  Discussed healthy lifestyle modifications.    BMI:   Estimated body mass index is 30.68 kg/m  as calculated from the following:    Height as of this encounter: 1.76 m (5' 9.29\").    Weight as of this encounter: 95 kg (209 lb 8 oz).   Weight management plan: Discussed healthy diet and exercise guidelines      Healthcare maintenance  - COVID-19,PF,PFIZER BOOSTER BIVALENT (12+YRS)    Attention deficit disorder (ADD) without hyperactivity  Trial of Concerta.  She " "will let me know how things go in the next few months  - methylphenidate HCl ER (CONCERTA) 18 MG CR tablet  Dispense: 30 tablet; Refill: 0    Vaginitis and vulvovaginitis  Metronidazole vaginal gel sent to the pharmacy.  Discussed that if she has continued issues she should contact me at which point we can do a \"lab only\" swab  - metroNIDAZOLE (METROGEL) 0.75 % vaginal gel  Dispense: 70 g; Refill: 0    Anxiety  Refill Xanax sent to the pharmacy.  Patient uses infrequently  - ALPRAZolam (XANAX) 0.5 MG tablet  Dispense: 10 tablet; Refill: 0    Moderate episode of recurrent major depressive disorder (H)  Stable at this point.    Posttraumatic stress disorder  Stable.  Continuing to work therapy.        Patient has been advised of split billing requirements and indicates understanding: Yes      Subjective:     Britney Singh is a 26 year old female who presents for an annual exam.  She is feeling stressed recently.  Apparently she and her boyfriend put a bit on a house and [place] and they are waiting for the final inspection.  She has a school-aged daughter as well.    She otherwise wonders about switching her Adderall to something different.  She states that she feels like the Adderall gives her palpitations.  She wonders about trying Concerta.  She has tried Vyvanse in the past but did not do well with that medication.    Additionally, patient thinks that she may be had BV.  She states it feels similar to BV she is had in the past.  She does not feel though she needs to be swabbed but was hopeful to get the vaginal gel treatment.        Healthy Habits:   Regular Exercise: Yes  Sunscreen Use: Yes  Healthy Diet: yes  Dental Visits Regularly: yes  Seat Belt: Yes  Self Breast Exam Monthly: yes  Prevention of Osteoporosis: yes    Immunization History   Administered Date(s) Administered     COVID-19,PF,Pfizer (12+ Yrs) 01/21/2021, 02/11/2021, 09/28/2021     COVID-19,PF,Pfizer 12+ YRS BIVALENT Booster 11/08/2022     DTAP " (<7y) 02/15/1994, 04/15/1994, 1994, 06/15/1995, 1999     Flu, Unspecified 10/04/2017, 10/01/2018, 2019     HEPA 2007, 2010     HPV 2007, 2008, 2008     HepB 02/15/1994, 1994, 10/13/1994, 2011     Influenza (IIV3) PF 1993, 2005, 2005, 2006, 2007, 2009, 2010, 10/14/2013, 10/01/2014, 2015     Influenza Vaccine IM > 6 months Valent IIV4 (Alfuria,Fluzone) 2016, 2020, 2021, 2022     MMR 1995, 1999     Mantoux Tuberculin Skin Test 2016     Meningococcal (Menomune ) 2005     Pneumococcal 23 valent 10/14/2013     Poliovirus, inactivated (IPV) 02/15/1994, 04/15/1994, 1994, 06/15/1995     Tdap (Adacel,Boostrix) 2005, 2015         Gynecologic History  Patient's last menstrual period was 10/26/2022 (exact date).  Contraception: progesterone only pill  Last Pap:10/2021. Results were: normal      OB History    Para Term  AB Living   1 1 0 0 0 1   SAB IAB Ectopic Multiple Live Births   0 0 0 0 0      # Outcome Date GA Lbr Fredy/2nd Weight Sex Delivery Anes PTL Lv   1 Para               Obstetric Comments   Pain with menses   But cycles are normal       Current Outpatient Medications   Medication Sig Dispense Refill     albuterol (PROAIR HFA/PROVENTIL HFA/VENTOLIN HFA) 108 (90 Base) MCG/ACT inhaler Inhale 2 puffs into the lungs every 6 hours as needed       ALPRAZolam (XANAX) 0.5 MG tablet Take 1 tablet (0.5 mg) by mouth 3 times daily as needed for anxiety 10 tablet 0     amphetamine-dextroamphetamine (ADDERALL) 5 MG tablet TAKE 1 TABLET BY MOUTH TWICE A DAY 60 tablet 0     Diaphragm Arc-Spring (CAYA) DPRH Place 1 Units vaginally daily as needed 1 each 1     hydrOXYzine (ATARAX) 25 MG tablet TAKE 1 TABLET (25 MG) BY MOUTH EVERY 6 HOURS AS NEEDED FOR ITCHING AND TAKE1 TABLET BY MOUTH THREETIMES DAILY AS NEEDED FOR ANXIETY 30 tablet 0      methylphenidate HCl ER (CONCERTA) 18 MG CR tablet Take 1 tablet (18 mg) by mouth every morning 30 tablet 0     metroNIDAZOLE (METROGEL) 0.75 % vaginal gel Place 1 applicator (5 g) vaginally daily 70 g 0     mupirocin (BACTROBAN) 2 % external ointment Apply topically 3 times daily 30 g 0     NORLYDA 0.35 MG tablet TAKE 1 TABLET BY MOUTH DAILY 90 tablet 2     ondansetron (ZOFRAN) 4 MG tablet Take 1 tablet (4 mg) by mouth 3 times daily as needed 30 tablet 1     prazosin (MINIPRESS) 2 MG capsule Take 2-3 capsules (4-6 mg) by mouth At Bedtime 270 capsule 1     propranolol (INDERAL) 20 MG tablet Take 1 tablet (20 mg) by mouth 2 times daily 180 tablet 3     rizatriptan (MAXALT-MLT) 5 MG ODT TAKE 1 TABLET (5 MG) BY MOUTH AT ONSET OF HEADACHE FOR MIGRAINE MAY REPEAT IN 2 HOURS. MAX 6 TABLETS/24 HOURS. 10 tablet 0     SUMAtriptan (IMITREX) 50 MG tablet Take 1 tablet (50 mg) by mouth at onset of headache for migraine May repeat in 2 hours - max 4 tabs/day 10 tablet 0     tiZANidine (ZANAFLEX) 2 MG tablet Take 0.5-2 tablets (1-4 mg) by mouth 3 times daily 90 tablet 1     Past Medical History:   Diagnosis Date     Anorexia      Asthma      Asthma      Bipolar 1 disorder (H)      Borderline personality disorder (H)      Depression      Depressive disorder      Eating disorder      PTSD (post-traumatic stress disorder)      Past Surgical History:   Procedure Laterality Date     WISDOM TOOTH EXTRACTION  2011     Amoxicillin and Adhesive tape  Family History   Problem Relation Age of Onset     Mental Illness Mother      Thyroid Disease Mother      Depression Mother      Anxiety Disorder Mother      Alcohol/Drug Father      Diabetes Type 2  Father      Hypertension Father      Hemochromatosis Father      Diabetes Father      Mental Illness Father      Alzheimer Disease Maternal Grandmother         dementia     Heart Disease Maternal Grandmother      Cerebrovascular Disease Maternal Grandfather      Skin Cancer Maternal Grandfather   "    Diabetes Maternal Grandfather      Hypertension Maternal Grandfather      Obesity Maternal Grandfather      Breast Cancer Paternal Grandmother         dx age 70     Asthma Brother      Family History Negative Other      Hyperlipidemia Other      Mental Illness Brother      Substance Abuse Brother      Social History     Socioeconomic History     Marital status: Patient Declined     Spouse name: Not on file     Number of children: Not on file     Years of education: Not on file     Highest education level: Not on file   Occupational History     Not on file   Tobacco Use     Smoking status: Former     Packs/day: 0.00     Years: 0.00     Pack years: 0.00     Types: Cigarettes     Quit date: 6/7/2012     Years since quitting: 10.4     Smokeless tobacco: Never   Vaping Use     Vaping Use: Never used   Substance and Sexual Activity     Alcohol use: Not Currently     Comment: has in the past     Drug use: Not Currently     Types: Marijuana     Comment: has in the past     Sexual activity: Yes     Partners: Male     Birth control/protection: Diaphragm   Other Topics Concern     Parent/sibling w/ CABG, MI or angioplasty before 65F 55M? No   Social History Narrative     Not on file     Social Determinants of Health     Financial Resource Strain: Not on file   Food Insecurity: Not on file   Transportation Needs: Not on file   Physical Activity: Not on file   Stress: Not on file   Social Connections: Not on file   Intimate Partner Violence: Not on file   Housing Stability: Not on file       Review of Systems  12 point review of systems was completed and found to be negative except for what is been stated above.      Objective:      Vitals:    11/08/22 1418   BP: 122/68   Pulse: 77   Resp: 16   Temp: 97.4  F (36.3  C)   TempSrc: Tympanic   SpO2: 100%   Weight: 95 kg (209 lb 8 oz)   Height: 1.76 m (5' 9.29\")         Physical Exam:  General Appearance: Alert, cooperative, no distress, appears stated age   Head: Normocephalic, " without obvious abnormality, atraumatic  Eyes: PERRL, conjunctiva/corneas clear, EOM's intact   Ears: Normal TM's and external ear canals, both ears  Neck: Supple, symmetrical, trachea midline, no adenopathy;  thyroid: not enlarged, symmetric, no tenderness/mass/nodules  Back: Symmetric, no curvature, ROM normal,  Lungs: Clear to auscultation bilaterally, respirations unlabored  Breasts: No breast masses, tenderness, asymmetry, or nipple discharge.  Heart: Regular rate and rhythm, S1 and S2 normal, no murmur, rub, or gallop  Abdomen: Soft, non-tender, bowel sounds active all four quadrants,  no masses, no organomegaly  Extremities: Extremities normal, atraumatic, no cyanosis or edema  Skin: Skin color, texture, turgor normal, no rashes or lesions  Lymph nodes: Cervical, supraclavicular, and axillary nodes normal and   Neurologic: Normal

## 2022-11-11 ENCOUNTER — HOSPITAL ENCOUNTER (EMERGENCY)
Facility: CLINIC | Age: 27
Discharge: HOME | End: 2022-11-11
Attending: EMERGENCY MEDICINE | Admitting: EMERGENCY MEDICINE
Payer: COMMERCIAL

## 2022-11-11 VITALS
HEART RATE: 79 BPM | SYSTOLIC BLOOD PRESSURE: 121 MMHG | HEIGHT: 69 IN | DIASTOLIC BLOOD PRESSURE: 72 MMHG | WEIGHT: 209 LBS | TEMPERATURE: 98 F | RESPIRATION RATE: 18 BRPM | BODY MASS INDEX: 30.96 KG/M2 | OXYGEN SATURATION: 98 %

## 2022-11-11 DIAGNOSIS — L03.213 PERIORBITAL CELLULITIS OF LEFT EYE: ICD-10-CM

## 2022-11-11 PROCEDURE — 99283 EMERGENCY DEPT VISIT LOW MDM: CPT | Performed by: EMERGENCY MEDICINE

## 2022-11-11 PROCEDURE — 99284 EMERGENCY DEPT VISIT MOD MDM: CPT | Performed by: EMERGENCY MEDICINE

## 2022-11-11 RX ORDER — CEPHALEXIN 500 MG/1
500 CAPSULE ORAL 2 TIMES DAILY
Qty: 40 CAPSULE | Refills: 0 | Status: SHIPPED | OUTPATIENT
Start: 2022-11-11 | End: 2022-12-12

## 2022-11-11 ASSESSMENT — ENCOUNTER SYMPTOMS
FEVER: 0
SHORTNESS OF BREATH: 0
ABDOMINAL PAIN: 0
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
## 2022-11-11 NOTE — ED TRIAGE NOTES
Pt reports left eye pain and swelling that started Tuesday. Pt states the pain has gotten worse since then. Pt reports increased pain when blinking and when moving the eye. Pt reports the left eye vision may be slightly blurrier than normal. Pt states her daughter had cellulitis last week and is wondering if this is related. Localized swelling and redness noted on eyelid.      Triage Assessment       Row Name 11/11/22 0118       Triage Assessment (Adult)    Airway WDL WDL       Respiratory WDL    Respiratory WDL WDL       Skin Circulation/Temperature WDL    Skin Circulation/Temperature WDL WDL       Cardiac WDL    Cardiac WDL WDL       Peripheral/Neurovascular WDL    Peripheral Neurovascular WDL WDL       Cognitive/Neuro/Behavioral WDL    Cognitive/Neuro/Behavioral WDL WDL

## 2022-11-11 NOTE — DISCHARGE INSTRUCTIONS
Antibiotics as prescribed.  Follow-up in clinic as needed.    Be seen if new or worsening symptoms develop.

## 2022-11-13 DIAGNOSIS — F41.9 ANXIETY: ICD-10-CM

## 2022-11-13 DIAGNOSIS — G43.009 MIGRAINE WITHOUT AURA AND WITHOUT STATUS MIGRAINOSUS, NOT INTRACTABLE: ICD-10-CM

## 2022-11-13 DIAGNOSIS — G43.809 OTHER MIGRAINE WITHOUT STATUS MIGRAINOSUS, NOT INTRACTABLE: ICD-10-CM

## 2022-11-17 RX ORDER — RIZATRIPTAN BENZOATE 5 MG/1
5 TABLET, ORALLY DISINTEGRATING ORAL
Qty: 10 TABLET | Refills: 0 | Status: SHIPPED | OUTPATIENT
Start: 2022-11-17 | End: 2023-07-10

## 2022-11-17 RX ORDER — HYDROXYZINE HYDROCHLORIDE 25 MG/1
TABLET, FILM COATED ORAL
Qty: 30 TABLET | Refills: 0 | Status: SHIPPED | OUTPATIENT
Start: 2022-11-17 | End: 2023-07-11

## 2022-11-17 RX ORDER — SUMATRIPTAN 50 MG/1
TABLET, FILM COATED ORAL
Qty: 10 TABLET | Refills: 0 | Status: SHIPPED | OUTPATIENT
Start: 2022-11-17 | End: 2023-02-23

## 2022-11-17 NOTE — TELEPHONE ENCOUNTER
"Requested Prescriptions   Pending Prescriptions Disp Refills     rizatriptan (MAXALT-MLT) 5 MG ODT [Pharmacy Med Name: RIZATRIPTAN 5MG ODT] 10 tablet 0     Sig: TAKE 1 TABLET (5 MG) BY MOUTH AT ONSET OF HEADACHE FOR MIGRAINE MAY REPEAT IN 2 HOURS. MAX 6 TABLETS/24 HOURS.       Serotonin Agonists Failed - 11/13/2022 11:00 AM        Failed - Serotonin Agonist request needs review.     Please review patient's record. If patient has had 8 or more treatments in the past month, please forward to provider.          Passed - Blood pressure under 140/90 in past 12 months     BP Readings from Last 3 Encounters:   11/11/22 121/72   11/08/22 122/68   10/21/22 (!) 143/77                 Passed - Recent (12 mo) or future (30 days) visit within the authorizing provider's specialty     Patient has had an office visit with the authorizing provider or a provider within the authorizing providers department within the previous 12 mos or has a future within next 30 days. See \"Patient Info\" tab in inbasket, or \"Choose Columns\" in Meds & Orders section of the refill encounter.              Passed - Medication is active on med list        Passed - Patient is age 18 or older        Passed - No active pregnancy on record        Passed - No positive pregnancy test in past 12 months           hydrOXYzine (ATARAX) 25 MG tablet [Pharmacy Med Name: HYDROXYZINE HCL 25MG TABLET] 30 tablet 0     Sig: TAKE 1 TABLET (25 MG) BY MOUTH EVERY 6 HOURS AS NEEDED FOR ITCHING AND TAKE1 TABLET BY MOUTH THREETIMES DAILY AS NEEDED FOR ANXIETY       Antihistamines Protocol Passed - 11/13/2022 11:00 AM        Passed - Recent (12 mo) or future (30 days) visit within the authorizing provider's specialty     Patient has had an office visit with the authorizing provider or a provider within the authorizing providers department within the previous 12 mos or has a future within next 30 days. See \"Patient Info\" tab in inbasket, or \"Choose Columns\" in Meds & Orders " "section of the refill encounter.              Passed - Patient is age 3 or older     Apply age and/or weight-based dosing for peds patients age 3 and older.    Forward request to provider for patients under the age of 3.          Passed - Medication is active on med list           SUMAtriptan (IMITREX) 50 MG tablet [Pharmacy Med Name: SUMATRIPTAN 50MG TABLET] 10 tablet 0     Sig: TAKE 1 TABLET (50 MG) BY MOUTH AT ONSET OF HEADACHE OR MIGRAINE MAY REPEAT IN 2 HOURS - MAX 4 TABS/DAY       Serotonin Agonists Failed - 11/13/2022 11:00 AM        Failed - Serotonin Agonist request needs review.     Please review patient's record. If patient has had 8 or more treatments in the past month, please forward to provider.          Passed - Blood pressure under 140/90 in past 12 months     BP Readings from Last 3 Encounters:   11/11/22 121/72   11/08/22 122/68   10/21/22 (!) 143/77                 Passed - Recent (12 mo) or future (30 days) visit within the authorizing provider's specialty     Patient has had an office visit with the authorizing provider or a provider within the authorizing providers department within the previous 12 mos or has a future within next 30 days. See \"Patient Info\" tab in inbasket, or \"Choose Columns\" in Meds & Orders section of the refill encounter.              Passed - Medication is active on med list        Passed - Patient is age 18 or older        Passed - No active pregnancy on record        Passed - No positive pregnancy test in past 12 months             "

## 2022-11-18 NOTE — PROGRESS NOTES
PHYSICAL THERAPY DISCHARGE NOTE  Patient seen from 4/1/22-5/10/22. Patient did not return for follow up treatments. The daily note from the patient's last visit will serve as the discharge note. Discharge from PT services at this time for this episode of treatment. Please see attached documentation under this episode of care for further information including dates of service, start of care date, referring physician, Dx, treatment plan, treatments, etc.    Please contact me with any questions or concerns.  Thank you for your referral.    Hermelinda Steel, PT, DPT, OCS  Physical Therapist, Orthopedic Certified Specialist    [facility]  [address]   [address] [place] [address]  [email or web address]   Office: [phone]   Employed by [employer]     05/10/22 1200   Signing Clinician's Name / Credentials   Signing clinician's name / credentials Edd Crabtree, PT, DPT, CSCS   Session Number   Session Number 5   Authorization status Blue plus, cert req   Progress Note/Recertification   Progress Note Due Date 05/27/22   Recertification Due Date 05/27/22   Adult Goals   PT Ortho Eval Goals 1;2;3   Ortho Goal 1   Goal Identifier 1   Goal Description Patient will report a reduction in headache frequency to 1x a week or less.    Goal Progress reduction in HA freqency but does still occur 2-3 times a week   Target Date 05/27/22   Ortho Goal 2   Goal Identifier 2   Goal Description Patient will be able to turn head to at least 70 degrees both directions pain free to be able to look for traffic while driving.    Target Date 05/27/22   Ortho Goal 3   Goal Identifier 3   Goal Description Patient will be able to reach top shelf in cupboard without pain.    Target Date 05/27/22   Ortho Goal 4   Goal Identifier 4   Goal Description Patient will be independent with HEP to aid functional recovery.    Goal Progress answered questions regarding  "performance   Target Date 05/27/22   Subjective Report   Subjective Report Patient states she continues to have \"annoying\" pain with driving as well as sleeping and reading dull pain. Continues to have radiating pain to shoulder blades and right shoulder.   Treatment Interventions   Interventions Therapeutic Procedure/Exercise;Manual Therapy   Manual Therapy   Manual Therapy: Mobilization, MFR, MLD, friction massage minutes (61240) 25   Skilled Intervention MT to decrease pain and improve mobility   Patient Response R rot post tx 68   Treatment Detail SO release. manual cervical traction. STM cervical paraspinals. TPR B UT. hold-relax L SB, contract relax R Rot   Education   Learner Patient   Readiness Eager   Method Explanation;Demonstration   Response Verbalizes Understanding;Demonstrates Understanding   Plan   Homework PTRX - on phone   Home program [id number]   Plan for next session Progress HEP, MT for jt mobs and STM   Total Session Time   Timed Code Treatment Minutes 25   Total Treatment Time (sum of timed and untimed services) 25   Medicare Claim Information   Medical Diagnosis Cervicalgia M54.2    PT Diagnosis neck pain, headaches      "

## 2022-11-25 DIAGNOSIS — R11.0 NAUSEA: ICD-10-CM

## 2022-11-28 RX ORDER — ONDANSETRON 4 MG/1
4 TABLET, FILM COATED ORAL 3 TIMES DAILY PRN
Qty: 30 TABLET | Refills: 1 | Status: SHIPPED | OUTPATIENT
Start: 2022-11-28 | End: 2023-03-27

## 2022-12-05 ENCOUNTER — OFFICE VISIT (OUTPATIENT)
Dept: FAMILY MEDICINE | Facility: CLINIC | Age: 27
End: 2022-12-05
Payer: COMMERCIAL

## 2022-12-05 VITALS
BODY MASS INDEX: 30.96 KG/M2 | RESPIRATION RATE: 20 BRPM | WEIGHT: 209 LBS | OXYGEN SATURATION: 99 % | DIASTOLIC BLOOD PRESSURE: 76 MMHG | SYSTOLIC BLOOD PRESSURE: 116 MMHG | TEMPERATURE: 97.6 F | HEART RATE: 73 BPM | HEIGHT: 69 IN

## 2022-12-05 DIAGNOSIS — M79.89 MASS OF SOFT TISSUE: Primary | ICD-10-CM

## 2022-12-05 PROCEDURE — 99213 OFFICE O/P EST LOW 20 MIN: CPT | Performed by: FAMILY MEDICINE

## 2022-12-05 ASSESSMENT — PAIN SCALES - GENERAL: PAINLEVEL: NO PAIN (0)

## 2022-12-05 NOTE — PROGRESS NOTES
"  Assessment & Plan     Mass of soft tissue  I advised it is likely a cyst or scar tissue, normal post-inflammatory hyperpigmentation overlying, but since it has grown bigger and might continue to grow bigger, I suggested to get US of the mass in about a month if still growing. If cystic and becoming more bothersome I advised to seek derm for removal.  - US Lower Extremity Non Vascular Left    Return if symptoms worsen or fail to improve.    Tomasa Del Valle MD  [facility]   Britney is a 26 year old accompanied by her partner, Jasbir, presenting for the following health issues:  Mass and Health Maintenance (Advised patient of .)      Mass       Rash  Onset/Duration: Hard lump and skin discoloration, the lump is the issue, the discoloration has gotten better  Description  Location: left hip  Character: round, lump underneath the rash  Itching: no  Intensity:  na  Progression of Symptoms:  The mass has Gotten bigger  Accompanying signs and symptoms:   Fever: No  Body aches or joint pain: No  Sore throat symptoms: No  Recent cold symptoms: No  History:           Previous episodes of similar rash: in October, has a similar rash, now has a bump. Hasn't improved at all  New exposures:  None  Recent travel: No  Exposure to similar rash: No  Precipitating or alleviating factors: none  Therapies tried and outcome: prescription medication helped the rash    Review of Systems   Constitutional, HEENT, cardiovascular, pulmonary, gi and gu systems are negative, except as otherwise noted.      Objective    /76 (BP Location: Right arm, Patient Position: Sitting, Cuff Size: Adult Regular)   Pulse 73   Temp 97.6  F (36.4  C) (Tympanic)   Resp 20   Ht 1.753 m (5' 9\")   Wt 94.8 kg (209 lb)   LMP 11/19/2022 (Exact Date)   SpO2 99%   BMI 30.86 kg/m    Body mass index is 30.86 kg/m .  Physical Exam   GENERAL: healthy, alert and no distress  RESP: normal respiratory effort, " speaking in complete sentences  MS: no gross musculoskeletal defects noted, no edema  SKIN: healed area of skin with mild hyperpigmentation overlying R hip with semifirm palpable mass kind of oval in shape, not well circumscribed  PSYCH: mentation appears normal, affect normal/bright

## 2022-12-06 DIAGNOSIS — F51.5 NIGHTMARES ASSOCIATED WITH CHRONIC POST-TRAUMATIC STRESS DISORDER: ICD-10-CM

## 2022-12-06 DIAGNOSIS — F43.12 NIGHTMARES ASSOCIATED WITH CHRONIC POST-TRAUMATIC STRESS DISORDER: ICD-10-CM

## 2022-12-08 RX ORDER — PRAZOSIN HYDROCHLORIDE 2 MG/1
CAPSULE ORAL
Qty: 270 CAPSULE | Refills: 1 | Status: SHIPPED | OUTPATIENT
Start: 2022-12-08 | End: 2023-02-22

## 2022-12-08 NOTE — TELEPHONE ENCOUNTER
"Requested Prescriptions   Pending Prescriptions Disp Refills     prazosin (MINIPRESS) 2 MG capsule [Pharmacy Med Name: PRAZOSIN 2MG CAPSULE] 270 capsule 1     Sig: TAKE 2 TO 3 CAPSULES BY MOUTH AT BEDTIME       Antiadrenergic Antihypertensives Failed - 12/6/2022  1:02 AM        Failed - Normal serum creatinine on file in past 12 months     Recent Labs   Lab Test 10/22/21  0945   CR 0.64       Ok to refill medication if creatinine is low          Passed - Blood pressure less than 140/90 in past 6 months     BP Readings from Last 3 Encounters:   12/05/22 116/76   11/11/22 121/72   11/08/22 122/68                 Passed - Medication is active on med list        Passed - Patient is age 18 or older        Passed - No active pregnancy on record        Passed - No positive pregnancy test within past 12 months        Passed - Recent (6 mo) or future (30 days) visit within the authorizing provider's specialty     Patient had office visit in the last 6 months or has a visit in the next 30 days with authorizing provider or within the authorizing provider's specialty.  See \"Patient Info\" tab in inFancloudsket, or \"Choose Columns\" in Meds & Orders section of the refill encounter.           Alpha Blockers Passed - 12/6/2022  1:02 AM        Passed - Blood pressure under 140/90 in past 12 months     BP Readings from Last 3 Encounters:   12/05/22 116/76   11/11/22 121/72   11/08/22 122/68                 Passed - Recent (12 mo) or future (30 days) visit within the authorizing provider's specialty     Patient has had an office visit with the authorizing provider or a provider within the authorizing providers department within the previous 12 mos or has a future within next 30 days. See \"Patient Info\" tab in inbasket, or \"Choose Columns\" in Meds & Orders section of the refill encounter.              Passed - Patient does not have Tadalafil, Vardenafil, or Sildenafil on their medication list        Passed - Medication is active on med list "        Passed - Patient is 18 years of age or older        Passed - No active pregnancy on record        Passed - No positive pregnancy test in past 12 months         '

## 2022-12-12 ENCOUNTER — TELEPHONE (OUTPATIENT)
Dept: FAMILY MEDICINE | Facility: CLINIC | Age: 27
End: 2022-12-12

## 2022-12-12 NOTE — TELEPHONE ENCOUNTER
Prior Authorization Retail Medication Request    Medication/Dose:   ICD code (if different than what is on RX):  Attention deficit disorder (ADD) without hyperactivity [F98.8]   Previously Tried and Failed:    Rationale:      Covermymeds:  Key: [id number]  Last Name: Francisco  : 1995    Pharmacy Information (if different than what is on RX)  [facility]  Phone:  [phone]

## 2022-12-13 NOTE — TELEPHONE ENCOUNTER
PA Initiation    Medication: Concerta 18mg ER PA INITIATED  Insurance Company: Blue [employer] PMA - Phone [phone] Fax [phone]  Pharmacy Filling the Rx:    Filling Pharmacy Phone:    Filling Pharmacy Fax:    Start Date: 12/13/2022    Central Prior Authorization Team   Phone: [phone]

## 2022-12-15 NOTE — TELEPHONE ENCOUNTER
PRIOR AUTHORIZATION DENIED    Medication: Concerta 18mg ER  Twice Daily PA DENIED    Denial Date: 12/14/2022    Denial Rational:       Appeal Information:

## 2023-01-20 ENCOUNTER — E-VISIT (OUTPATIENT)
Dept: FAMILY MEDICINE | Facility: CLINIC | Age: 28
End: 2023-01-20
Payer: COMMERCIAL

## 2023-01-20 DIAGNOSIS — F98.8 ATTENTION DEFICIT DISORDER (ADD) WITHOUT HYPERACTIVITY: Primary | ICD-10-CM

## 2023-01-20 PROCEDURE — 99421 OL DIG E/M SVC 5-10 MIN: CPT | Performed by: FAMILY MEDICINE

## 2023-01-20 RX ORDER — METHYLPHENIDATE HYDROCHLORIDE 27 MG/1
27 TABLET ORAL DAILY
Qty: 30 TABLET | Refills: 0 | Status: SHIPPED | OUTPATIENT
Start: 2023-02-20 | End: 2023-02-23

## 2023-01-20 RX ORDER — METHYLPHENIDATE HYDROCHLORIDE 27 MG/1
27 TABLET ORAL DAILY
Qty: 30 TABLET | Refills: 0 | Status: SHIPPED | OUTPATIENT
Start: 2023-03-23 | End: 2023-02-23

## 2023-01-20 RX ORDER — METHYLPHENIDATE HYDROCHLORIDE 27 MG/1
27 TABLET ORAL DAILY
Qty: 30 TABLET | Refills: 0 | Status: SHIPPED | OUTPATIENT
Start: 2023-01-20 | End: 2023-02-19

## 2023-01-20 NOTE — PATIENT INSTRUCTIONS
Thank you for choosing us for your care. I have placed an order for a prescription so that you can start treatment. View your full visit summary for details by clicking on the link below. Your pharmacist will able to address any questions you may have about the medication.     If you're not feeling better within 5-7 days, please schedule an appointment.  You can schedule an appointment right here in [facility], or call [phone]  If the visit is for the same symptoms as your eVisit, we'll refund the cost of your eVisit if seen within seven days.

## 2023-02-22 ENCOUNTER — TELEPHONE (OUTPATIENT)
Dept: FAMILY MEDICINE | Facility: CLINIC | Age: 28
End: 2023-02-22

## 2023-02-22 ENCOUNTER — VIRTUAL VISIT (OUTPATIENT)
Dept: FAMILY MEDICINE | Facility: CLINIC | Age: 28
End: 2023-02-22
Payer: COMMERCIAL

## 2023-02-22 DIAGNOSIS — F51.5 NIGHTMARES ASSOCIATED WITH CHRONIC POST-TRAUMATIC STRESS DISORDER: ICD-10-CM

## 2023-02-22 DIAGNOSIS — F43.12 NIGHTMARES ASSOCIATED WITH CHRONIC POST-TRAUMATIC STRESS DISORDER: ICD-10-CM

## 2023-02-22 DIAGNOSIS — F41.9 ANXIETY: ICD-10-CM

## 2023-02-22 DIAGNOSIS — F98.8 ATTENTION DEFICIT DISORDER (ADD) WITHOUT HYPERACTIVITY: Primary | ICD-10-CM

## 2023-02-22 DIAGNOSIS — F33.1 MODERATE EPISODE OF RECURRENT MAJOR DEPRESSIVE DISORDER (H): ICD-10-CM

## 2023-02-22 DIAGNOSIS — M54.2 CERVICALGIA: ICD-10-CM

## 2023-02-22 PROCEDURE — 99214 OFFICE O/P EST MOD 30 MIN: CPT | Mod: VID | Performed by: FAMILY MEDICINE

## 2023-02-22 RX ORDER — METHYLPHENIDATE HYDROCHLORIDE 18 MG/1
18 TABLET ORAL DAILY
Qty: 30 TABLET | Refills: 0 | Status: SHIPPED | OUTPATIENT
Start: 2023-02-22 | End: 2023-03-24

## 2023-02-22 RX ORDER — ALPRAZOLAM 0.5 MG
0.5 TABLET ORAL 3 TIMES DAILY PRN
Qty: 10 TABLET | Refills: 0 | Status: SHIPPED | OUTPATIENT
Start: 2023-02-22 | End: 2023-07-10

## 2023-02-22 RX ORDER — METHYLPHENIDATE HYDROCHLORIDE 18 MG/1
18 TABLET ORAL DAILY
Qty: 30 TABLET | Refills: 0 | Status: SHIPPED | OUTPATIENT
Start: 2023-04-25 | End: 2023-04-24

## 2023-02-22 RX ORDER — METHYLPHENIDATE HYDROCHLORIDE 18 MG/1
18 TABLET ORAL DAILY
Qty: 30 TABLET | Refills: 0 | Status: SHIPPED | OUTPATIENT
Start: 2023-03-25 | End: 2023-04-24

## 2023-02-22 RX ORDER — DEXTROAMPHETAMINE SACCHARATE, AMPHETAMINE ASPARTATE, DEXTROAMPHETAMINE SULFATE AND AMPHETAMINE SULFATE 1.25; 1.25; 1.25; 1.25 MG/1; MG/1; MG/1; MG/1
5 TABLET ORAL DAILY
Qty: 30 TABLET | Refills: 0 | Status: SHIPPED | OUTPATIENT
Start: 2023-02-22 | End: 2023-03-24

## 2023-02-22 RX ORDER — PRAZOSIN HYDROCHLORIDE 2 MG/1
8 CAPSULE ORAL AT BEDTIME
Qty: 270 CAPSULE | Refills: 1 | Status: SHIPPED | OUTPATIENT
Start: 2023-02-22 | End: 2023-07-11

## 2023-02-22 RX ORDER — DEXTROAMPHETAMINE SACCHARATE, AMPHETAMINE ASPARTATE, DEXTROAMPHETAMINE SULFATE AND AMPHETAMINE SULFATE 1.25; 1.25; 1.25; 1.25 MG/1; MG/1; MG/1; MG/1
5 TABLET ORAL DAILY
Qty: 30 TABLET | Refills: 0 | Status: SHIPPED | OUTPATIENT
Start: 2023-04-25 | End: 2023-04-24

## 2023-02-22 RX ORDER — DEXTROAMPHETAMINE SACCHARATE, AMPHETAMINE ASPARTATE, DEXTROAMPHETAMINE SULFATE AND AMPHETAMINE SULFATE 1.25; 1.25; 1.25; 1.25 MG/1; MG/1; MG/1; MG/1
5 TABLET ORAL DAILY
Qty: 30 TABLET | Refills: 0 | Status: SHIPPED | OUTPATIENT
Start: 2023-03-25 | End: 2023-04-24

## 2023-02-22 RX ORDER — TIZANIDINE 2 MG/1
1-4 TABLET ORAL 3 TIMES DAILY
Qty: 90 TABLET | Refills: 1 | Status: SHIPPED | OUTPATIENT
Start: 2023-02-22 | End: 2023-07-11

## 2023-02-22 NOTE — TELEPHONE ENCOUNTER
Hi - it was supposed to be JAGDISH for the name brand as she is not tolerating the generic - thanks    EB

## 2023-02-22 NOTE — TELEPHONE ENCOUNTER
Does the methylphenidate have to be dispense as written for generic?  Pharmacy calling  Jody Jimenez RN

## 2023-02-22 NOTE — PROGRESS NOTES
Britney is a 27 year old who is being evaluated via a billable video visit.      How would you like to obtain your AVS? Abby  If the video visit is dropped, the invitation should be resent by: Text to cell phone: [phone]  Will anyone else be joining your video visit? No      -------------------------    Assessment/Plan:    Britney Singh is a 27 year old female presenting for:    Anxiety  Refill limited Xanax sent to the pharmacy patient uses this very appropriately and minimally  - ALPRAZolam (XANAX) 0.5 MG tablet  Dispense: 10 tablet; Refill: 0    Cervicalgia  Refill tizanidine sent to the pharmacy  - tiZANidine (ZANAFLEX) 2 MG tablet  Dispense: 90 tablet; Refill: 1    Nightmares associated with chronic post-traumatic stress disorder  Increase prazosin to 4 capsules at night  - prazosin (MINIPRESS) 2 MG capsule  Dispense: 270 capsule; Refill: 1    Attention deficit disorder (ADD) without hyperactivity  We will decrease Concerta back to 18 mg and have her get the name brand.  Also sent Adderall immediate release 5 mg to the pharmacy which she can take as needed  - amphetamine-dextroamphetamine (ADDERALL) 5 MG tablet  Dispense: 30 tablet; Refill: 0  - amphetamine-dextroamphetamine (ADDERALL) 5 MG tablet  Dispense: 30 tablet; Refill: 0  - amphetamine-dextroamphetamine (ADDERALL) 5 MG tablet  Dispense: 30 tablet; Refill: 0  - methylphenidate HCl ER (CONCERTA) 18 MG CR tablet  Dispense: 30 tablet; Refill: 0  - methylphenidate HCl ER (CONCERTA) 18 MG CR tablet  Dispense: 30 tablet; Refill: 0  - methylphenidate HCl ER (CONCERTA) 18 MG CR tablet  Dispense: 30 tablet; Refill: 0    Moderate episode of recurrent major depressive disorder (H)  Stable at this point.  Continue psychology.        Medications Discontinued During This Encounter   Medication Reason     tiZANidine (ZANAFLEX) 2 MG tablet Reorder (No AVS / No eCancel)     prazosin (MINIPRESS) 2 MG capsule Reorder (No AVS / No eCancel)     ALPRAZolam (XANAX)  "0.5 MG tablet Reorder (No AVS / No eCancel)           Chief Complaint:  Recheck Medication          Subjective:   Britney Singh is a pleasant 27 year old female being evaluated via video visit today for the following concern/s:     Patient would like to discuss her anxiety and her ADHD.    With regard to her anxiety and depression she feels as though this is fairly well controlled.  She is currently taking occasional Xanax (maybe once every few weeks) and needs a refill today.  She has a history of PTSD and difficulty sleeping due to this and she is currently on prazosin 4 mg at night but wishes to increase this a bit.  She tolerates the medication well and finds that it helps with her nightmares.    She is currently seeing a counselor which is helpful as well.    She also would like to discuss her ADHD.  She is currently on Concerta 27 mg.  She states that she thinks the 18 mg worked a bit better.  She is hopeful to get the name brand as she has not tolerated the generic well.  She also wonders about another medication to take later in the day if she either forgot to take her daily pill or feels as though she needs a little \"boost\" in the afternoon.  She has tried Adderall in the past and it did work fairly well although increased her anxiety a bit.    12 point review of systems completed and negative except for what has been described above    History   Smoking Status     Former     Packs/day: 0.00     Years: 0.00     Types: Cigarettes     Quit date: 6/7/2012   Smokeless Tobacco     Never         Current Outpatient Medications:      albuterol (PROAIR HFA/PROVENTIL HFA/VENTOLIN HFA) 108 (90 Base) MCG/ACT inhaler, Inhale 2 puffs into the lungs every 6 hours as needed, Disp: , Rfl:      ALPRAZolam (XANAX) 0.5 MG tablet, Take 1 tablet (0.5 mg) by mouth 3 times daily as needed for anxiety, Disp: 10 tablet, Rfl: 0     amphetamine-dextroamphetamine (ADDERALL) 5 MG tablet, Take 1 tablet (5 mg) by mouth daily for 30 " days, Disp: 30 tablet, Rfl: 0     [START ON 3/25/2023] amphetamine-dextroamphetamine (ADDERALL) 5 MG tablet, Take 1 tablet (5 mg) by mouth daily for 30 days, Disp: 30 tablet, Rfl: 0     [START ON 4/25/2023] amphetamine-dextroamphetamine (ADDERALL) 5 MG tablet, Take 1 tablet (5 mg) by mouth daily for 30 days, Disp: 30 tablet, Rfl: 0     Diaphragm Arc-Spring (CAYA) DPRH, Place 1 Units vaginally daily as needed, Disp: 1 each, Rfl: 1     hydrOXYzine (ATARAX) 25 MG tablet, TAKE 1 TABLET (25 MG) BY MOUTH EVERY 6 HOURS AS NEEDED FOR ITCHING AND TAKE1 TABLET BY MOUTH THREETIMES DAILY AS NEEDED FOR ANXIETY, Disp: 30 tablet, Rfl: 0     methylphenidate (CONCERTA) 27 MG CR tablet, Take 1 tablet (27 mg) by mouth daily for 30 days, Disp: 30 tablet, Rfl: 0     methylphenidate HCl ER (CONCERTA) 18 MG CR tablet, Take 1 tablet (18 mg) by mouth daily for 30 days, Disp: 30 tablet, Rfl: 0     [START ON 3/25/2023] methylphenidate HCl ER (CONCERTA) 18 MG CR tablet, Take 1 tablet (18 mg) by mouth daily for 30 days, Disp: 30 tablet, Rfl: 0     [START ON 4/25/2023] methylphenidate HCl ER (CONCERTA) 18 MG CR tablet, Take 1 tablet (18 mg) by mouth daily for 30 days, Disp: 30 tablet, Rfl: 0     metroNIDAZOLE (METROGEL) 0.75 % vaginal gel, Place 1 applicator (5 g) vaginally daily, Disp: 70 g, Rfl: 0     NORLYDA 0.35 MG tablet, TAKE 1 TABLET BY MOUTH DAILY, Disp: 90 tablet, Rfl: 2     ondansetron (ZOFRAN) 4 MG tablet, Take 1 tablet (4 mg) by mouth 3 times daily as needed, Disp: 30 tablet, Rfl: 1     prazosin (MINIPRESS) 2 MG capsule, Take 4 capsules (8 mg) by mouth At Bedtime for 90 days, Disp: 270 capsule, Rfl: 1     propranolol (INDERAL) 20 MG tablet, Take 1 tablet (20 mg) by mouth 2 times daily, Disp: 180 tablet, Rfl: 3     rizatriptan (MAXALT-MLT) 5 MG ODT, TAKE 1 TABLET (5 MG) BY MOUTH AT ONSET OF HEADACHE FOR MIGRAINE MAY REPEAT IN 2 HOURS. MAX 6 TABLETS/24 HOURS., Disp: 10 tablet, Rfl: 0     tiZANidine (ZANAFLEX) 2 MG tablet, Take 0.5-2  tablets (1-4 mg) by mouth 3 times daily, Disp: 90 tablet, Rfl: 1     [START ON 3/23/2023] methylphenidate (CONCERTA) 27 MG CR tablet, Take 1 tablet (27 mg) by mouth daily for 30 days, Disp: 30 tablet, Rfl: 0     methylphenidate HCl ER (CONCERTA) 18 MG CR tablet, Take 1 tablet (18 mg) by mouth every morning, Disp: 30 tablet, Rfl: 0     SUMAtriptan (IMITREX) 50 MG tablet, TAKE 1 TABLET (50 MG) BY MOUTH AT ONSET OF HEADACHE OR MIGRAINE MAY REPEAT IN 2 HOURS - MAX 4 TABS/DAY (Patient not taking: Reported on 2/22/2023), Disp: 10 tablet, Rfl: 0        Objective:  No vitals were done due to the nature of this visit  No flowsheet data found.            General: No acute distress  Psych: Appropriate affect  HEENT: moist mucous membranes  Pulmonary: Breathing comfortably, speaking in complete sentences  Extremities: warm and well perfused with no edema  Skin: warm and dry with no rash         This note has been dictated and transcribed using voice recognition software.   Any errors in transcription are unintentional and inherent to the software.    Video-Visit Details    Type of service:  Video Visit   Video Start Time: 1240pm  Video End Time:112pm    Originating Location (pt. Location): Home    Distant Location (provider location):  On-site  Platform used for Video Visit: Geraldine

## 2023-02-23 PROBLEM — F33.1 MODERATE EPISODE OF RECURRENT MAJOR DEPRESSIVE DISORDER (H): Status: ACTIVE | Noted: 2023-02-23

## 2023-02-23 NOTE — TELEPHONE ENCOUNTER
Call placed to pharmacy  Spoke with Pharmacist    Relayed Dr. Arnold message  Pharmacist verbalized understanding  No further questions/concerns    Maximino Clemente RN

## 2023-03-25 DIAGNOSIS — R11.0 NAUSEA: ICD-10-CM

## 2023-03-27 RX ORDER — ONDANSETRON 4 MG/1
TABLET, FILM COATED ORAL
Qty: 30 TABLET | Refills: 1 | Status: SHIPPED | OUTPATIENT
Start: 2023-03-27 | End: 2023-09-14

## 2023-03-27 ASSESSMENT — ASTHMA QUESTIONNAIRES
QUESTION_4 LAST FOUR WEEKS HOW OFTEN HAVE YOU USED YOUR RESCUE INHALER OR NEBULIZER MEDICATION (SUCH AS ALBUTEROL): ONCE A WEEK OR LESS
QUESTION_5 LAST FOUR WEEKS HOW WOULD YOU RATE YOUR ASTHMA CONTROL: COMPLETELY CONTROLLED
QUESTION_3 LAST FOUR WEEKS HOW OFTEN DID YOUR ASTHMA SYMPTOMS (WHEEZING, COUGHING, SHORTNESS OF BREATH, CHEST TIGHTNESS OR PAIN) WAKE YOU UP AT NIGHT OR EARLIER THAN USUAL IN THE MORNING: NOT AT ALL
ACT_TOTALSCORE: 23
QUESTION_1 LAST FOUR WEEKS HOW MUCH OF THE TIME DID YOUR ASTHMA KEEP YOU FROM GETTING AS MUCH DONE AT WORK, SCHOOL OR AT HOME: NONE OF THE TIME
QUESTION_2 LAST FOUR WEEKS HOW OFTEN HAVE YOU HAD SHORTNESS OF BREATH: ONCE OR TWICE A WEEK
ACT_TOTALSCORE: 23

## 2023-03-27 ASSESSMENT — ANXIETY QUESTIONNAIRES
8. IF YOU CHECKED OFF ANY PROBLEMS, HOW DIFFICULT HAVE THESE MADE IT FOR YOU TO DO YOUR WORK, TAKE CARE OF THINGS AT HOME, OR GET ALONG WITH OTHER PEOPLE?: VERY DIFFICULT
7. FEELING AFRAID AS IF SOMETHING AWFUL MIGHT HAPPEN: MORE THAN HALF THE DAYS
GAD7 TOTAL SCORE: 14
2. NOT BEING ABLE TO STOP OR CONTROL WORRYING: MORE THAN HALF THE DAYS
7. FEELING AFRAID AS IF SOMETHING AWFUL MIGHT HAPPEN: MORE THAN HALF THE DAYS
IF YOU CHECKED OFF ANY PROBLEMS ON THIS QUESTIONNAIRE, HOW DIFFICULT HAVE THESE PROBLEMS MADE IT FOR YOU TO DO YOUR WORK, TAKE CARE OF THINGS AT HOME, OR GET ALONG WITH OTHER PEOPLE: VERY DIFFICULT
4. TROUBLE RELAXING: NEARLY EVERY DAY
GAD7 TOTAL SCORE: 14
GAD7 TOTAL SCORE: 14
5. BEING SO RESTLESS THAT IT IS HARD TO SIT STILL: SEVERAL DAYS
1. FEELING NERVOUS, ANXIOUS, OR ON EDGE: MORE THAN HALF THE DAYS
3. WORRYING TOO MUCH ABOUT DIFFERENT THINGS: NEARLY EVERY DAY
6. BECOMING EASILY ANNOYED OR IRRITABLE: SEVERAL DAYS

## 2023-03-27 ASSESSMENT — PATIENT HEALTH QUESTIONNAIRE - PHQ9
SUM OF ALL RESPONSES TO PHQ QUESTIONS 1-9: 18
10. IF YOU CHECKED OFF ANY PROBLEMS, HOW DIFFICULT HAVE THESE PROBLEMS MADE IT FOR YOU TO DO YOUR WORK, TAKE CARE OF THINGS AT HOME, OR GET ALONG WITH OTHER PEOPLE: EXTREMELY DIFFICULT
SUM OF ALL RESPONSES TO PHQ QUESTIONS 1-9: 18

## 2023-03-27 NOTE — TELEPHONE ENCOUNTER
Routing refill request to provider for review/approval because:  PCP to determine refill    Maximino Clemente RN

## 2023-03-28 ENCOUNTER — VIRTUAL VISIT (OUTPATIENT)
Dept: FAMILY MEDICINE | Facility: CLINIC | Age: 28
End: 2023-03-28
Payer: COMMERCIAL

## 2023-03-28 DIAGNOSIS — U07.1 INFECTION DUE TO 2019 NOVEL CORONAVIRUS: Primary | ICD-10-CM

## 2023-03-28 DIAGNOSIS — F33.1 MODERATE EPISODE OF RECURRENT MAJOR DEPRESSIVE DISORDER (H): ICD-10-CM

## 2023-03-28 PROCEDURE — 99214 OFFICE O/P EST MOD 30 MIN: CPT | Mod: CS | Performed by: FAMILY MEDICINE

## 2023-03-28 PROCEDURE — 96127 BRIEF EMOTIONAL/BEHAV ASSMT: CPT | Mod: VID | Performed by: FAMILY MEDICINE

## 2023-03-28 RX ORDER — LAMOTRIGINE 25 MG/1
25 TABLET ORAL DAILY
Qty: 90 TABLET | Refills: 1 | Status: SHIPPED | OUTPATIENT
Start: 2023-03-28 | End: 2023-04-24

## 2023-03-28 NOTE — PROGRESS NOTES
Britney is a 27 year old who is being evaluated via a billable video visit.      How would you like to obtain your AVS? MyChart  If the video visit is dropped, the invitation should be resent by: Text to cell phone: [phone]  Will anyone else be joining your video visit? No            -------------------------    Assessment/Plan:    Britney Singh is a 27 year old female presenting for:    Infection due to 2019 novel coronavirus  Patient is fully vaccinated.  This is her second time that she has had COVID.  She had Paxlovid last time and tolerated it well and is hopeful to do that again.  Discussed that she is low risk and I do not think she necessarily needs the medication but she is hopeful to get it if she is very anxious about COVID in general.  - nirmatrelvir and ritonavir (PAXLOVID) 300 mg/100 mg therapy pack  Dispense: 30 tablet; Refill: 0    Moderate episode of recurrent major depressive disorder (H)  Patient is not done well with SSRIs in the past.  She would like to try a mood stabilizer type of medication.  We were able to review her [employer] testing from 2018 and it appears as though Lamictal would be appropriately processed.  Have sent this to the pharmacy.  I have also sent a mental health referral as I would feel more comfortable with her seeing a psychiatrist for some of these medications which I will then be happy to fill for her when she is stable.  Discussed risk and benefits of the medication.  If she feels worse she will stop taking it immediately.  She will also let her boyfriend know that she is taking it.  - lamoTRIgine (LAMICTAL) 25 MG tablet  Dispense: 90 tablet; Refill: 1  - Adult Mental Health  Referral    I personally spent over 35 minutes performing care related to this patient on the day of the patient visit.  This includes chart review, precharting, talking with/ examining the patient as well as completing after visit documentation.      There are no discontinued  medications.        Chief Complaint:  Recheck Medication          Subjective:   Britney Singh is a pleasant 27 year old female being evaluated via video visit today for the following concern/s:     COVID-19: Patient began to feel ill yesterday.  She tested yesterday which was negative and tested today which is positive.  She has some fevers.  No shortness of breath.  Not coughing.  Intense body aches.  Mild headache.  She is wondering about getting Paxlovid that she had this last time she had COVID about 6 months ago.    Depression and anxiety: Patient has a history of depression/anxiety/PTSD.  She is noting that her depression is a bit worse.  She has not done well with SSRIs or SNRIs in the past.  She is wondering about an atypical mood stabilizer type of medication.  She has seen psychiatry in the past and would be willing to see them again but is worried because she understands that the wait is quite long to get into see them.    No suicidal or homicidal ideations.  She is seeing a therapist and has good support.    .      12 point review of systems completed and negative except for what has been described above    History   Smoking Status     Former     Packs/day: 0.00     Years: 0.00     Types: Cigarettes     Quit date: 6/7/2012   Smokeless Tobacco     Never         Current Outpatient Medications:      albuterol (PROAIR HFA/PROVENTIL HFA/VENTOLIN HFA) 108 (90 Base) MCG/ACT inhaler, Inhale 2 puffs into the lungs every 6 hours as needed, Disp: , Rfl:      ALPRAZolam (XANAX) 0.5 MG tablet, Take 1 tablet (0.5 mg) by mouth 3 times daily as needed for anxiety, Disp: 10 tablet, Rfl: 0     amphetamine-dextroamphetamine (ADDERALL) 5 MG tablet, Take 1 tablet (5 mg) by mouth daily for 30 days, Disp: 30 tablet, Rfl: 0     [START ON 4/25/2023] amphetamine-dextroamphetamine (ADDERALL) 5 MG tablet, Take 1 tablet (5 mg) by mouth daily for 30 days, Disp: 30 tablet, Rfl: 0     Diaphragm Arc-Spring (CAYA) [facility], Place 1 Units  vaginally daily as needed, Disp: 1 each, Rfl: 1     hydrOXYzine (ATARAX) 25 MG tablet, TAKE 1 TABLET (25 MG) BY MOUTH EVERY 6 HOURS AS NEEDED FOR ITCHING AND TAKE1 TABLET BY MOUTH THREETIMES DAILY AS NEEDED FOR ANXIETY, Disp: 30 tablet, Rfl: 0     lamoTRIgine (LAMICTAL) 25 MG tablet, Take 1 tablet (25 mg) by mouth daily, Disp: 90 tablet, Rfl: 1     methylphenidate HCl ER (CONCERTA) 18 MG CR tablet, Take 1 tablet (18 mg) by mouth daily for 30 days, Disp: 30 tablet, Rfl: 0     [START ON 4/25/2023] methylphenidate HCl ER (CONCERTA) 18 MG CR tablet, Take 1 tablet (18 mg) by mouth daily for 30 days, Disp: 30 tablet, Rfl: 0     metroNIDAZOLE (METROGEL) 0.75 % vaginal gel, Place 1 applicator (5 g) vaginally daily, Disp: 70 g, Rfl: 0     nirmatrelvir and ritonavir (PAXLOVID) 300 mg/100 mg therapy pack, Take 3 tablets by mouth 2 times daily for 5 days, Disp: 30 tablet, Rfl: 0     NORLYDA 0.35 MG tablet, TAKE 1 TABLET BY MOUTH DAILY, Disp: 90 tablet, Rfl: 2     ondansetron (ZOFRAN) 4 MG tablet, TAKE 1 TABLET BY MOUTH THREE TIMES DAILY AS NEEDED, Disp: 30 tablet, Rfl: 1     prazosin (MINIPRESS) 2 MG capsule, Take 4 capsules (8 mg) by mouth At Bedtime for 90 days, Disp: 270 capsule, Rfl: 1     propranolol (INDERAL) 20 MG tablet, Take 1 tablet (20 mg) by mouth 2 times daily, Disp: 180 tablet, Rfl: 3     rizatriptan (MAXALT-MLT) 5 MG ODT, TAKE 1 TABLET (5 MG) BY MOUTH AT ONSET OF HEADACHE FOR MIGRAINE MAY REPEAT IN 2 HOURS. MAX 6 TABLETS/24 HOURS., Disp: 10 tablet, Rfl: 0     tiZANidine (ZANAFLEX) 2 MG tablet, Take 0.5-2 tablets (1-4 mg) by mouth 3 times daily, Disp: 90 tablet, Rfl: 1        Objective:  No vitals were done due to the nature of this visit  No flowsheet data found.            General: No acute distress  Psych: Appropriate affect  HEENT: moist mucous membranes  Pulmonary: Breathing comfortably, speaking in complete sentences  Extremities: warm and well perfused with no edema  Skin: warm and dry with no rash        This note has been dictated and transcribed using voice recognition software.   Any errors in transcription are unintentional and inherent to the software.      Video-Visit Details    Type of service:  Video Visit   Video Start Time: 1240  Video End Time:107    Originating Location (pt. Location): Home    Distant Location (provider location):  On-site  Platform used for Video Visit: [employer]    Answers for HPI/ROS submitted by the patient on 3/27/2023  If you checked off any problems, how difficult have these problems made it for you to do your work, take care of things at home, or get along with other people?: Extremely difficult  PHQ9 TOTAL SCORE: 18  CHEMO 7 TOTAL SCORE: 14  Depression/Anxiety: Depression & Anxiety  Status since last visit:: worse  Anxiety since last: : no change  Other associated symptoms of depression:: No  Other associated symotome: : No  Significant life event: : job concerns  Anxious:: Yes  Current substance use:: No  What is the reason for your visit today? : Med follow up  How many servings of fruits and vegetables do you eat daily?: 4 or more  On average, how many sweetened beverages do you drink each day (Examples: soda, juice, sweet tea, etc.  Do NOT count diet or artificially sweetened beverages)?: 0  How many minutes a day do you exercise enough to make your heart beat faster?: 30 to 60  How many days a week do you exercise enough to make your heart beat faster?: 5  How many days per week do you miss taking your medication?: 0

## 2023-04-17 ENCOUNTER — TRANSFERRED RECORDS (OUTPATIENT)
Dept: HEALTH INFORMATION MANAGEMENT | Facility: CLINIC | Age: 28
End: 2023-04-17

## 2023-04-24 ENCOUNTER — VIRTUAL VISIT (OUTPATIENT)
Dept: FAMILY MEDICINE | Facility: CLINIC | Age: 28
End: 2023-04-24
Payer: COMMERCIAL

## 2023-04-24 DIAGNOSIS — N76.0 VAGINITIS AND VULVOVAGINITIS: ICD-10-CM

## 2023-04-24 DIAGNOSIS — U07.1 INFECTION DUE TO 2019 NOVEL CORONAVIRUS: ICD-10-CM

## 2023-04-24 DIAGNOSIS — F33.1 MODERATE EPISODE OF RECURRENT MAJOR DEPRESSIVE DISORDER (H): Primary | ICD-10-CM

## 2023-04-24 DIAGNOSIS — F98.8 ATTENTION DEFICIT DISORDER (ADD) WITHOUT HYPERACTIVITY: ICD-10-CM

## 2023-04-24 PROCEDURE — 99214 OFFICE O/P EST MOD 30 MIN: CPT | Mod: CS | Performed by: FAMILY MEDICINE

## 2023-04-24 RX ORDER — METRONIDAZOLE 7.5 MG/G
1 GEL VAGINAL DAILY
Qty: 70 G | Refills: 0 | Status: SHIPPED | OUTPATIENT
Start: 2023-04-24 | End: 2023-05-16

## 2023-04-24 RX ORDER — FLUTICASONE PROPIONATE 110 UG/1
1 AEROSOL, METERED RESPIRATORY (INHALATION) 2 TIMES DAILY
Qty: 12 G | Refills: 0 | Status: SHIPPED | OUTPATIENT
Start: 2023-04-24 | End: 2023-06-26

## 2023-04-24 RX ORDER — ALBUTEROL SULFATE 90 UG/1
2 AEROSOL, METERED RESPIRATORY (INHALATION) EVERY 6 HOURS PRN
Qty: 18 G | Refills: 1 | Status: SHIPPED | OUTPATIENT
Start: 2023-04-24 | End: 2024-05-18

## 2023-04-24 RX ORDER — GUANFACINE 1 MG/1
1 TABLET, EXTENDED RELEASE ORAL AT BEDTIME
Qty: 30 TABLET | Refills: 0 | Status: SHIPPED | OUTPATIENT
Start: 2023-04-24 | End: 2023-06-26

## 2023-04-24 NOTE — PROGRESS NOTES
Britney is a 27 year old who is being evaluated via a billable video visit.      How would you like to obtain your AVS? MyChart  If the video visit is dropped, the invitation should be resent by: Text to cell phone: [phone]  Will anyone else be joining your video visit? No      -------------------------    Assessment/Plan:    Britney Singh is a 27 year old female presenting for:    Moderate episode of recurrent major depressive disorder (H)  Stable at this point.  Definitely could be better controlled and I think a daily medication would be appropriate.  Follow-up with psychiatry as planned.    Attention deficit disorder (ADD) without hyperactivity  Guanfacine sent to the pharmacy.  Discussed risks and benefits.  She will me know how things go over the next week or 2.  - guanFACINE (INTUNIV) 1 MG TB24 24 hr tablet  Dispense: 30 tablet; Refill: 0    Vaginitis and vulvovaginitis  Patient believes that she has bacterial vaginosis again.  Ordered wet prep and sent metronidazole vaginal gel to the pharmacy.  - metroNIDAZOLE (METROGEL) 0.75 % vaginal gel  Dispense: 70 g; Refill: 0  - Wet prep - lab collect    Infection due to 2019 novel coronavirus  I think patient might benefit from a daily inhaler for the next month to help calm her lungs down from her recent COVID-19.  Flovent sent to the pharmacy.  Albuterol sent as well to be used as needed.  - fluticasone (FLOVENT HFA) 110 MCG/ACT inhaler  Dispense: 12 g; Refill: 0  - albuterol (PROAIR HFA/PROVENTIL HFA/VENTOLIN HFA) 108 (90 Base) MCG/ACT inhaler  Dispense: 18 g; Refill: 1        Medications Discontinued During This Encounter   Medication Reason     lamoTRIgine (LAMICTAL) 25 MG tablet Therapy completed (No AVS)     amphetamine-dextroamphetamine (ADDERALL) 5 MG tablet Duplicate Therapy (No eCancel)     methylphenidate HCl ER (CONCERTA) 18 MG CR tablet Duplicate Therapy (No eCancel)     albuterol (PROAIR HFA/PROVENTIL HFA/VENTOLIN HFA) 108 (90 Base) MCG/ACT  inhaler Reorder (No AVS / No eCancel)     propranolol (INDERAL) 20 MG tablet      metroNIDAZOLE (METROGEL) 0.75 % vaginal gel Reorder (No AVS / No eCancel)           Chief Complaint:  Follow Up and Recheck Medication          Subjective:   Britney Singh is a pleasant 27 year old female being evaluated via video visit today for the following concern/s:     Follow-up COVID: Patient had COVID-19 in a few weeks ago.  She states that she was fairly ill for a few days.  She did take Paxlovid which seemed to help.  She is feeling quite a bit better although still having some intermittent shortness of breath.  She thinks that this is improving very slowly with time.  She is unsure if she feels as though she is wheezing.  She does note that when she exerts herself she will feel short of breath.  She denies any chest pain.  She feels as though her lungs are tight.    Depression: Patient has a past medical history significant for depression.  We discussed this at her last visit as well.  I had hesitantly started a very low-dose of Lamictal based on [facility] lab results done in April 2018 (these are scanned into her chart).  Patient states that she has not been taking this as she became nervous about it.  She will be following up with a psychiatrist in about a week.    ADHD: Patient has a history of ADHD.  She is currently on Adderall and doing well with the medication.  She also wonders about trialing guanfacine as this has been very helpful for her daughter.  She is hopeful to get on this medication and that she can talk with her psychiatrist about it when she meets with them later this month.    12 point review of systems completed and negative except for what has been described above    History   Smoking Status     Former     Packs/day: 0.00     Years: 0.00     Types: Cigarettes     Quit date: 6/7/2012   Smokeless Tobacco     Never         Current Outpatient Medications:      albuterol (PROAIR HFA/PROVENTIL HFA/VENTOLIN  HFA) 108 (90 Base) MCG/ACT inhaler, Inhale 2 puffs into the lungs every 6 hours as needed for shortness of breath, Disp: 18 g, Rfl: 1     ALPRAZolam (XANAX) 0.5 MG tablet, Take 1 tablet (0.5 mg) by mouth 3 times daily as needed for anxiety, Disp: 10 tablet, Rfl: 0     amphetamine-dextroamphetamine (ADDERALL) 5 MG tablet, Take 1 tablet (5 mg) by mouth daily for 30 days, Disp: 30 tablet, Rfl: 0     Diaphragm Arc-Spring (CAYA) [facility], Place 1 Units vaginally daily as needed, Disp: 1 each, Rfl: 1     fluticasone (FLOVENT HFA) 110 MCG/ACT inhaler, Inhale 1 puff into the lungs 2 times daily, Disp: 12 g, Rfl: 0     guanFACINE (INTUNIV) 1 MG TB24 24 hr tablet, Take 1 tablet (1 mg) by mouth At Bedtime, Disp: 30 tablet, Rfl: 0     hydrOXYzine (ATARAX) 25 MG tablet, TAKE 1 TABLET (25 MG) BY MOUTH EVERY 6 HOURS AS NEEDED FOR ITCHING AND TAKE1 TABLET BY MOUTH THREETIMES DAILY AS NEEDED FOR ANXIETY, Disp: 30 tablet, Rfl: 0     methylphenidate HCl ER (CONCERTA) 18 MG CR tablet, Take 1 tablet (18 mg) by mouth daily for 30 days, Disp: 30 tablet, Rfl: 0     metroNIDAZOLE (METROGEL) 0.75 % vaginal gel, Place 1 applicator (5 g) vaginally daily, Disp: 70 g, Rfl: 0     NORLYDA 0.35 MG tablet, TAKE 1 TABLET BY MOUTH DAILY, Disp: 90 tablet, Rfl: 2     ondansetron (ZOFRAN) 4 MG tablet, TAKE 1 TABLET BY MOUTH THREE TIMES DAILY AS NEEDED, Disp: 30 tablet, Rfl: 1     prazosin (MINIPRESS) 2 MG capsule, Take 4 capsules (8 mg) by mouth At Bedtime for 90 days, Disp: 270 capsule, Rfl: 1     rizatriptan (MAXALT-MLT) 5 MG ODT, TAKE 1 TABLET (5 MG) BY MOUTH AT ONSET OF HEADACHE FOR MIGRAINE MAY REPEAT IN 2 HOURS. MAX 6 TABLETS/24 HOURS., Disp: 10 tablet, Rfl: 0     tiZANidine (ZANAFLEX) 2 MG tablet, Take 0.5-2 tablets (1-4 mg) by mouth 3 times daily, Disp: 90 tablet, Rfl: 1        Objective:  No vitals were done due to the nature of this visit       View : No data to display.                        General: No acute distress  Psych: Appropriate  affect  HEENT: moist mucous membranes  Pulmonary: Breathing comfortably, speaking in complete sentences  Extremities: warm and well perfused with no edema  Skin: warm and dry with no rash         This note has been dictated and transcribed using voice recognition software.   Any errors in transcription are unintentional and inherent to the software.        Video-Visit Details    Type of service:  Video Visit   Video Start Time: 103pm  Video End Time: 125pm    Originating Location (pt. Location): Home    Distant Location (provider location):  On-site  Platform used for Video Visit: Well    Answers for HPI/ROS submitted by the patient on 4/17/2023  What is the reason for your visit today? : Follow up  How many servings of fruits and vegetables do you eat daily?: 4 or more  On average, how many sweetened beverages do you drink each day (Examples: soda, juice, sweet tea, etc.  Do NOT count diet or artificially sweetened beverages)?: 0  How many minutes a day do you exercise enough to make your heart beat faster?: 30 to 60  How many days a week do you exercise enough to make your heart beat faster?: 5  How many days per week do you miss taking your medication?: 0

## 2023-05-16 ENCOUNTER — HOSPITAL ENCOUNTER (OUTPATIENT)
Dept: ULTRASOUND IMAGING | Facility: HOSPITAL | Age: 28
Discharge: HOME | End: 2023-05-16
Attending: FAMILY MEDICINE | Admitting: FAMILY MEDICINE
Payer: COMMERCIAL

## 2023-05-16 ENCOUNTER — OFFICE VISIT (OUTPATIENT)
Dept: FAMILY MEDICINE | Facility: CLINIC | Age: 28
End: 2023-05-16
Payer: COMMERCIAL

## 2023-05-16 VITALS
WEIGHT: 204 LBS | RESPIRATION RATE: 16 BRPM | HEART RATE: 82 BPM | DIASTOLIC BLOOD PRESSURE: 64 MMHG | OXYGEN SATURATION: 100 % | SYSTOLIC BLOOD PRESSURE: 138 MMHG | TEMPERATURE: 97.4 F | HEIGHT: 69 IN | BODY MASS INDEX: 30.21 KG/M2

## 2023-05-16 DIAGNOSIS — R10.2 PELVIC PAIN: ICD-10-CM

## 2023-05-16 DIAGNOSIS — R10.2 PELVIC PAIN: Primary | ICD-10-CM

## 2023-05-16 DIAGNOSIS — N92.0 MENORRHAGIA WITH REGULAR CYCLE: ICD-10-CM

## 2023-05-16 PROBLEM — D64.9 ANEMIA: Status: ACTIVE | Noted: 2023-05-12

## 2023-05-16 PROCEDURE — 76856 US EXAM PELVIC COMPLETE: CPT

## 2023-05-16 PROCEDURE — 99214 OFFICE O/P EST MOD 30 MIN: CPT | Performed by: FAMILY MEDICINE

## 2023-05-16 RX ORDER — DEXMETHYLPHENIDATE HYDROCHLORIDE 10 MG/1
10 TABLET ORAL
COMMUNITY
End: 2024-06-25

## 2023-05-16 RX ORDER — NORGESTIMATE AND ETHINYL ESTRADIOL 0.25-0.035
KIT ORAL
Qty: 28 TABLET | Refills: 0 | Status: SHIPPED | OUTPATIENT
Start: 2023-05-16 | End: 2023-06-26

## 2023-05-16 RX ORDER — METHYLPHENIDATE HYDROCHLORIDE 27 MG/1
TABLET, EXTENDED RELEASE ORAL
COMMUNITY
Start: 2023-01-20 | End: 2023-05-16

## 2023-05-16 RX ORDER — SUMATRIPTAN 50 MG/1
50 TABLET, FILM COATED ORAL
COMMUNITY
End: 2023-07-10

## 2023-05-16 RX ORDER — OXYCODONE HYDROCHLORIDE 5 MG/1
TABLET ORAL
COMMUNITY
Start: 2023-05-15 | End: 2023-06-26

## 2023-05-16 RX ORDER — KETOROLAC TROMETHAMINE 10 MG/1
10 TABLET, FILM COATED ORAL
COMMUNITY
Start: 2023-05-15 | End: 2023-06-26

## 2023-05-16 ASSESSMENT — PATIENT HEALTH QUESTIONNAIRE - PHQ9
SUM OF ALL RESPONSES TO PHQ QUESTIONS 1-9: 7
SUM OF ALL RESPONSES TO PHQ QUESTIONS 1-9: 7
10. IF YOU CHECKED OFF ANY PROBLEMS, HOW DIFFICULT HAVE THESE PROBLEMS MADE IT FOR YOU TO DO YOUR WORK, TAKE CARE OF THINGS AT HOME, OR GET ALONG WITH OTHER PEOPLE: VERY DIFFICULT

## 2023-05-16 NOTE — PROGRESS NOTES
Britney Singh is a 27 year old female presenting for:    1. Pelvic pain  Etiology of pelvic pain is unclear.  Top of differential would be ovarian cyst which may remain out of rupture, ovarian torsion or potentially as muscular pain.    Ultrasound was ordered.  High does hormonal therapy was ordered as well and we discussed the risks and benefits of the medication.      - REVIEW OF HEALTH MAINTENANCE PROTOCOL ORDERS  - US Pelvic Complete with Transvaginal; Future  - norgestimate-ethinyl estradiol (ORTHO-CYCLEN) 0.25-35 MG-MCG tablet; Take 4 tabs daily for 2 days, 3 tabs daily for 2 day, 2 tabs daily for 1 day and 1 tab daily for 5 days  Dispense: 28 tablet; Refill: 0    2. Menorrhagia with regular cycle      Addendum: Ultrasound showed thickened endometrium but was otherwise unremarkable.        Medications Discontinued During This Encounter   Medication Reason     Methylphenidate HCl (METHYLPHENIDATE ER) 27 MG 24H tablet Therapy completed (No AVS)     metroNIDAZOLE (METROGEL) 0.75 % vaginal gel Therapy completed (No AVS)           Chief Complaint:  ER F/U        Subjective:   Britney Singh is a very pleasant 18-year-old female who presents to clinic today for a emergency department follow-up.  Over the last several days she has been to the emergency department 2 times for concerns over vaginal bleeding and pelvic pain.    Vaginal bleeding started about 10 days ago.  Fairly heavy for 2 days but then was not improving.  Left lower quadrant abdominal pain began and she was seen in the emergency department.  She was not anemic.  They unfortunately did not have an ultrasound at the particular emergency department she went to and so this was not done.    Pregnancy test was negative.    Yesterday she was seen again in a different emergency department.  She was continuing to have bleeding and left lower quadrant abdominal pain.  Ultrasound was not recommended at that time.  Hemoglobin was stable.    She continues  to have bleeding today.  She states this is fairly heavy with clots.  Sometimes will lighten slightly but then get heavy again.  She is on progesterone only birth control and so has somewhat sporadic menstrual cycles but generally they are not heavy.  She also has left lower quadrant abdominal pain.  This has been fairly severe.  Waxing and waning slightly.    Normal bowel movements although she does note that the pain worsens when she needs to have a bowel movement.    12 point review of systems completed and negative except for what has been described above    History   Smoking Status     Former     Packs/day: 0.00     Years: 0.00     Types: Cigarettes     Quit date: 6/7/2012   Smokeless Tobacco     Never         Current Outpatient Medications:      albuterol (PROAIR HFA/PROVENTIL HFA/VENTOLIN HFA) 108 (90 Base) MCG/ACT inhaler, Inhale 2 puffs into the lungs every 6 hours as needed for shortness of breath, Disp: 18 g, Rfl: 1     ALPRAZolam (XANAX) 0.5 MG tablet, Take 1 tablet (0.5 mg) by mouth 3 times daily as needed for anxiety, Disp: 10 tablet, Rfl: 0     dexmethylphenidate (FOCALIN) 10 MG tablet, Take 10 mg by mouth, Disp: , Rfl:      Diaphragm Arc-Spring (CAYA) [facility], Place 1 Units vaginally daily as needed, Disp: 1 each, Rfl: 1     fluticasone (FLOVENT HFA) 110 MCG/ACT inhaler, Inhale 1 puff into the lungs 2 times daily, Disp: 12 g, Rfl: 0     guanFACINE (INTUNIV) 1 MG TB24 24 hr tablet, Take 1 tablet (1 mg) by mouth At Bedtime, Disp: 30 tablet, Rfl: 0     hydrOXYzine (ATARAX) 25 MG tablet, TAKE 1 TABLET (25 MG) BY MOUTH EVERY 6 HOURS AS NEEDED FOR ITCHING AND TAKE1 TABLET BY MOUTH THREETIMES DAILY AS NEEDED FOR ANXIETY, Disp: 30 tablet, Rfl: 0     ketorolac (TORADOL) 10 MG tablet, Take 10 mg by mouth, Disp: , Rfl:      norgestimate-ethinyl estradiol (ORTHO-CYCLEN) 0.25-35 MG-MCG tablet, Take 4 tabs daily for 2 days, 3 tabs daily for 2 day, 2 tabs daily for 1 day and 1 tab daily for 5 days, Disp: 28 tablet,  "Rfl: 0     NORLYDA 0.35 MG tablet, TAKE 1 TABLET BY MOUTH DAILY, Disp: 90 tablet, Rfl: 2     ondansetron (ZOFRAN) 4 MG tablet, TAKE 1 TABLET BY MOUTH THREE TIMES DAILY AS NEEDED, Disp: 30 tablet, Rfl: 1     oxyCODONE (ROXICODONE) 5 MG tablet, , Disp: , Rfl:      prazosin (MINIPRESS) 2 MG capsule, Take 4 capsules (8 mg) by mouth At Bedtime for 90 days, Disp: 270 capsule, Rfl: 1     rizatriptan (MAXALT-MLT) 5 MG ODT, TAKE 1 TABLET (5 MG) BY MOUTH AT ONSET OF HEADACHE FOR MIGRAINE MAY REPEAT IN 2 HOURS. MAX 6 TABLETS/24 HOURS., Disp: 10 tablet, Rfl: 0     SUMAtriptan (IMITREX) 50 MG tablet, Take 50 mg by mouth, Disp: , Rfl:      tiZANidine (ZANAFLEX) 2 MG tablet, Take 0.5-2 tablets (1-4 mg) by mouth 3 times daily, Disp: 90 tablet, Rfl: 1        Objective:  Vitals:    05/16/23 1324   BP: 138/64   Pulse: 82   Resp: 16   Temp: 97.4  F (36.3  C)   TempSrc: Tympanic   SpO2: 100%   Weight: 92.5 kg (204 lb)   Height: 1.753 m (5' 9\")       Body mass index is 30.13 kg/m .    Vital signs reviewed and stable  General: No acute distress  Psych: Appropriate affect  HEENT: moist mucous membranes, pupils equal, round, reactive to light and accomodation, tympanic membranes are pearly grey bilaterally  Lymph: no cervical or supraclavicular lymphadenopathy  Cardiovascular: regular rate and rhythm with no murmur  Pulmonary: clear to auscultation bilaterally with no wheeze  Abdomen: soft, tender in the left lower quadrant with no rebound but some guarding., non distended with normo-active bowel sounds  Extremities: warm and well perfused with no edema  Skin: warm and dry with no rash         This note has been dictated and transcribed using voice recognition software.   Any errors in transcription are unintentional and inherent to the software.    "

## 2023-06-27 ENCOUNTER — OFFICE VISIT (OUTPATIENT)
Dept: FAMILY MEDICINE | Facility: CLINIC | Age: 28
End: 2023-06-27
Payer: COMMERCIAL

## 2023-06-27 VITALS
HEART RATE: 80 BPM | OXYGEN SATURATION: 100 % | TEMPERATURE: 97.6 F | DIASTOLIC BLOOD PRESSURE: 70 MMHG | SYSTOLIC BLOOD PRESSURE: 138 MMHG | RESPIRATION RATE: 16 BRPM | HEIGHT: 69 IN | BODY MASS INDEX: 29.68 KG/M2 | WEIGHT: 200.38 LBS

## 2023-06-27 DIAGNOSIS — N92.0 MENORRHAGIA WITH REGULAR CYCLE: Primary | ICD-10-CM

## 2023-06-27 DIAGNOSIS — R10.2 PELVIC PAIN: ICD-10-CM

## 2023-06-27 DIAGNOSIS — F41.9 ANXIETY: ICD-10-CM

## 2023-06-27 PROCEDURE — 99213 OFFICE O/P EST LOW 20 MIN: CPT | Performed by: FAMILY MEDICINE

## 2023-06-27 RX ORDER — PROPRANOLOL HYDROCHLORIDE 20 MG/1
TABLET ORAL
COMMUNITY
End: 2023-06-27

## 2023-06-27 RX ORDER — PROPRANOLOL HYDROCHLORIDE 20 MG/1
TABLET ORAL
Qty: 90 TABLET | Refills: 3 | Status: SHIPPED | OUTPATIENT
Start: 2023-06-27

## 2023-06-27 NOTE — PROGRESS NOTES
Assessment/Plan:    Britney Singh is a 27 year old female presenting for:    Menorrhagia with regular cycle  Continue to follow with OB/GYN.  Hysterectomy as previously scheduled with them.  Answered all questions the best my ability.  We went over her medications in preparation for her surgery but she will plan on reviewing these again with the gynecologist who is doing her preop.    Pelvic pain  See above    Anxiety  Refill propranolol sent to the pharmacy  - propranolol (INDERAL) 20 MG tablet  Dispense: 90 tablet; Refill: 3      Medications Discontinued During This Encounter   Medication Reason     oxyCODONE (ROXICODONE) 5 MG tablet      fluticasone (FLOVENT HFA) 110 MCG/ACT inhaler      norgestimate-ethinyl estradiol (ORTHO-CYCLEN) 0.25-35 MG-MCG tablet      ketorolac (TORADOL) 10 MG tablet      guanFACINE (INTUNIV) 1 MG TB24 24 hr tablet      propranolol (INDERAL) 20 MG tablet Reorder (No AVS / No eCancel)           Chief Complaint:  Vaginal Bleeding        Subjective:   Britney Singh is a very pleasant 27-year-old female who presents to clinic today for follow-up of several issues.    .  Menorrhagia: Patient has a history of menorrhagia with coexisting pelvic pain.  This has been worsening.  Last month she had very heavy bleeding and extreme pain.  She was seen in 2 urgent cares.  She was then seen by me and I started her on high-dose OCPs which made her fairly nauseated although did stop the bleeding.  She followed up with OB/GYN who laid out different recommendations.  Patient is planning on getting a hysterectomy.  Gynecologist thought that she likely has adenomyosis as well as endometriosis.    She is feeling good overall regarding her decision.  Hysterectomy scheduled for next month.  She does not need a preop here today.    Anxiety: History of anxiety.  Hopeful to get a refill of her propranolol which she uses occasionally.    12 point review of systems completed and negative except for what has  "been described above    History   Smoking Status     Former     Packs/day: 0.00     Years: 0.00     Types: Cigarettes     Quit date: 6/7/2012   Smokeless Tobacco     Never         Current Outpatient Medications:      albuterol (PROAIR HFA/PROVENTIL HFA/VENTOLIN HFA) 108 (90 Base) MCG/ACT inhaler, Inhale 2 puffs into the lungs every 6 hours as needed for shortness of breath, Disp: 18 g, Rfl: 1     ALPRAZolam (XANAX) 0.5 MG tablet, Take 1 tablet (0.5 mg) by mouth 3 times daily as needed for anxiety, Disp: 10 tablet, Rfl: 0     dexmethylphenidate (FOCALIN) 10 MG tablet, Take 10 mg by mouth, Disp: , Rfl:      Diaphragm Arc-Spring (CAYA) DPRH, Place 1 Units vaginally daily as needed, Disp: 1 each, Rfl: 1     hydrOXYzine (ATARAX) 25 MG tablet, TAKE 1 TABLET (25 MG) BY MOUTH EVERY 6 HOURS AS NEEDED FOR ITCHING AND TAKE1 TABLET BY MOUTH THREETIMES DAILY AS NEEDED FOR ANXIETY, Disp: 30 tablet, Rfl: 0     NORLYDA 0.35 MG tablet, TAKE 1 TABLET BY MOUTH DAILY, Disp: 90 tablet, Rfl: 2     ondansetron (ZOFRAN) 4 MG tablet, TAKE 1 TABLET BY MOUTH THREE TIMES DAILY AS NEEDED, Disp: 30 tablet, Rfl: 1     propranolol (INDERAL) 20 MG tablet, 1 tablet Orally Once a day as needed for anxiety, Disp: 90 tablet, Rfl: 3     rizatriptan (MAXALT-MLT) 5 MG ODT, TAKE 1 TABLET (5 MG) BY MOUTH AT ONSET OF HEADACHE FOR MIGRAINE MAY REPEAT IN 2 HOURS. MAX 6 TABLETS/24 HOURS., Disp: 10 tablet, Rfl: 0     SUMAtriptan (IMITREX) 50 MG tablet, Take 50 mg by mouth, Disp: , Rfl:      tiZANidine (ZANAFLEX) 2 MG tablet, Take 0.5-2 tablets (1-4 mg) by mouth 3 times daily, Disp: 90 tablet, Rfl: 1     prazosin (MINIPRESS) 2 MG capsule, Take 4 capsules (8 mg) by mouth At Bedtime for 90 days, Disp: 270 capsule, Rfl: 1      Objective:  Vitals:    06/27/23 1025   BP: 138/70   Pulse: 80   Resp: 16   Temp: 97.6  F (36.4  C)   TempSrc: Tympanic   SpO2: 100%   Weight: 90.9 kg (200 lb 6 oz)   Height: 1.753 m (5' 9\")       Body mass index is 29.59 kg/m .    Vital signs " reviewed and stable  General: No acute distress  Psych: Appropriate affect  HEENT: moist mucous membranes, pupils equal, round, reactive to light and accomodation, tympanic membranes are pearly grey bilaterally  Lymph: no cervical or supraclavicular lymphadenopathy  Cardiovascular: regular rate and rhythm with no murmur  Pulmonary: clear to auscultation bilaterally with no wheeze  Abdomen: soft, non tender, non distended with normo-active bowel sounds  Extremities: warm and well perfused with no edema  Skin: warm and dry with no rash         This note has been dictated and transcribed using voice recognition software.   Any errors in transcription are unintentional and inherent to the software.

## 2023-07-10 ENCOUNTER — MYC REFILL (OUTPATIENT)
Dept: FAMILY MEDICINE | Facility: CLINIC | Age: 28
End: 2023-07-10
Payer: COMMERCIAL

## 2023-07-10 DIAGNOSIS — F41.9 ANXIETY: ICD-10-CM

## 2023-07-10 DIAGNOSIS — F43.12 NIGHTMARES ASSOCIATED WITH CHRONIC POST-TRAUMATIC STRESS DISORDER: ICD-10-CM

## 2023-07-10 DIAGNOSIS — M54.2 CERVICALGIA: ICD-10-CM

## 2023-07-10 DIAGNOSIS — G43.009 MIGRAINE WITHOUT AURA AND WITHOUT STATUS MIGRAINOSUS, NOT INTRACTABLE: ICD-10-CM

## 2023-07-10 DIAGNOSIS — F51.5 NIGHTMARES ASSOCIATED WITH CHRONIC POST-TRAUMATIC STRESS DISORDER: ICD-10-CM

## 2023-07-10 NOTE — TELEPHONE ENCOUNTER
"Routing refill request to provider for review/approval because:  Labs not current:  Creatinine  Drug not on protocol list  PCP to determine        Requested Prescriptions   Pending Prescriptions Disp Refills    prazosin (MINIPRESS) 2 MG capsule [Pharmacy Med Name: PRAZOSIN 2MG CAPSULE] 270 capsule 1     Sig: TAKE 4 CAPSULES (8 MG) BY MOUTH AT BEDTIME       Antiadrenergic Antihypertensives Failed - 7/10/2023 12:55 PM        Failed - Normal serum creatinine on file in past 12 months     Recent Labs   Lab Test 10/22/21  0945   CR 0.64       Ok to refill medication if creatinine is low          Passed - Blood pressure less than 140/90 in past 6 months     BP Readings from Last 3 Encounters:   06/27/23 138/70   05/16/23 138/64   12/05/22 116/76                 Passed - Medication is active on med list        Passed - Patient is age 18 or older        Passed - No active pregnancy on record        Passed - No positive pregnancy test within past 12 months        Passed - Recent (6 mo) or future (30 days) visit within the authorizing provider's specialty     Patient had office visit in the last 6 months or has a visit in the next 30 days with authorizing provider or within the authorizing provider's specialty.  See \"Patient Info\" tab in inbasket, or \"Choose Columns\" in Meds & Orders section of the refill encounter.           Alpha Blockers Passed - 7/10/2023 12:55 PM        Passed - Blood pressure under 140/90 in past 12 months     BP Readings from Last 3 Encounters:   06/27/23 138/70   05/16/23 138/64   12/05/22 116/76                 Passed - Recent (12 mo) or future (30 days) visit within the authorizing provider's specialty     Patient has had an office visit with the authorizing provider or a provider within the authorizing providers department within the previous 12 mos or has a future within next 30 days. See \"Patient Info\" tab in inbasket, or \"Choose Columns\" in Meds & Orders section of the refill encounter.          " "    Passed - Patient does not have Tadalafil, Vardenafil, or Sildenafil on their medication list        Passed - Medication is active on med list        Passed - Patient is 18 years of age or older        Passed - No active pregnancy on record        Passed - No positive pregnancy test in past 12 months          hydrOXYzine (ATARAX) 25 MG tablet [Pharmacy Med Name: HYDROXYZINE HCL 25MG TABLET] 30 tablet 0     Sig: TAKE 1 TABLET BY MOUTH EVERY SIX HOURS AS NEEDED FOR FOR ITCHING AND TAKE 1 TABLET BY MOUTH THREE TIMESDAILY AS NEEDED FOR ANXIETY       Antihistamines Protocol Passed - 7/10/2023 12:55 PM        Passed - Recent (12 mo) or future (30 days) visit within the authorizing provider's specialty     Patient has had an office visit with the authorizing provider or a provider within the authorizing providers department within the previous 12 mos or has a future within next 30 days. See \"Patient Info\" tab in inbasket, or \"Choose Columns\" in Meds & Orders section of the refill encounter.              Passed - Patient is age 3 or older     Apply age and/or weight-based dosing for peds patients age 3 and older.    Forward request to provider for patients under the age of 3.          Passed - Medication is active on med list          tiZANidine (ZANAFLEX) 2 MG tablet [Pharmacy Med Name: TIZANIDINE 2MG TABLET] 90 tablet 1     Sig: TAKE 1/2 TO 2 TABLETS BY MOUTH THREE TIMES DAILY       There is no refill protocol information for this order              Britney Bhat RN 07/10/23 4:57 PM    "

## 2023-07-10 NOTE — TELEPHONE ENCOUNTER
"Routing refill request to provider for review/approval because:  Drug not on the [facility] refill protocol   Serotonin agonist  CHEMO 7 over 5    Requested Prescriptions   Pending Prescriptions Disp Refills     rizatriptan (MAXALT-MLT) 5 MG ODT 10 tablet 0     Sig: Take 1 tablet (5 mg) by mouth at onset of headache for migraine May repeat in 2 hours. Max 6 tablets/24 hours.       Serotonin Agonists Failed - 7/10/2023 12:57 PM        Failed - Serotonin Agonist request needs review.     Please review patient's record. If patient has had 8 or more treatments in the past month, please forward to provider.          Passed - Blood pressure under 140/90 in past 12 months     BP Readings from Last 3 Encounters:   06/27/23 138/70   05/16/23 138/64   12/05/22 116/76                 Passed - Recent (12 mo) or future (30 days) visit within the authorizing provider's specialty     Patient has had an office visit with the authorizing provider or a provider within the authorizing providers department within the previous 12 mos or has a future within next 30 days. See \"Patient Info\" tab in inbasket, or \"Choose Columns\" in Meds & Orders section of the refill encounter.              Passed - Medication is active on med list        Passed - Patient is age 18 or older        Passed - No active pregnancy on record        Passed - No positive pregnancy test in past 12 months           hydrOXYzine (ATARAX) 25 MG tablet 30 tablet 0       Antihistamines Protocol Passed - 7/10/2023 12:57 PM        Passed - Recent (12 mo) or future (30 days) visit within the authorizing provider's specialty     Patient has had an office visit with the authorizing provider or a provider within the authorizing providers department within the previous 12 mos or has a future within next 30 days. See \"Patient Info\" tab in inbasket, or \"Choose Columns\" in Meds & Orders section of the refill encounter.              Passed - Patient is age 3 or older     Apply age and/or " "weight-based dosing for peds patients age 3 and older.    Forward request to provider for patients under the age of 3.          Passed - Medication is active on med list           ALPRAZolam (XANAX) 0.5 MG tablet 10 tablet 0     Sig: Take 1 tablet (0.5 mg) by mouth 3 times daily as needed for anxiety       There is no refill protocol information for this order        SUMAtriptan (IMITREX) 50 MG tablet       Sig: Take 1 tablet (50 mg) by mouth       Serotonin Agonists Failed - 7/10/2023 12:57 PM        Failed - Serotonin Agonist request needs review.     Please review patient's record. If patient has had 8 or more treatments in the past month, please forward to provider.          Passed - Blood pressure under 140/90 in past 12 months     BP Readings from Last 3 Encounters:   06/27/23 138/70   05/16/23 138/64   12/05/22 116/76                 Passed - Recent (12 mo) or future (30 days) visit within the authorizing provider's specialty     Patient has had an office visit with the authorizing provider or a provider within the authorizing providers department within the previous 12 mos or has a future within next 30 days. See \"Patient Info\" tab in inbasket, or \"Choose Columns\" in Meds & Orders section of the refill encounter.              Passed - Medication is active on med list        Passed - Patient is age 18 or older        Passed - No active pregnancy on record        Passed - No positive pregnancy test in past 12 months                 Britney Bhat RN 07/10/23 5:17 PM    "

## 2023-07-11 ENCOUNTER — TELEPHONE (OUTPATIENT)
Dept: FAMILY MEDICINE | Facility: CLINIC | Age: 28
End: 2023-07-11
Payer: COMMERCIAL

## 2023-07-11 RX ORDER — PRAZOSIN HYDROCHLORIDE 2 MG/1
CAPSULE ORAL
Qty: 270 CAPSULE | Refills: 1 | Status: SHIPPED | OUTPATIENT
Start: 2023-07-11 | End: 2023-10-18

## 2023-07-11 RX ORDER — SUMATRIPTAN 50 MG/1
50 TABLET, FILM COATED ORAL
Qty: 10 TABLET | Refills: 4 | Status: SHIPPED | OUTPATIENT
Start: 2023-07-11

## 2023-07-11 RX ORDER — ALPRAZOLAM 0.5 MG
0.5 TABLET ORAL 3 TIMES DAILY PRN
Qty: 10 TABLET | Refills: 0 | Status: SHIPPED | OUTPATIENT
Start: 2023-07-11 | End: 2024-06-25

## 2023-07-11 RX ORDER — RIZATRIPTAN BENZOATE 5 MG/1
5 TABLET, ORALLY DISINTEGRATING ORAL
Qty: 10 TABLET | Refills: 0 | Status: SHIPPED | OUTPATIENT
Start: 2023-07-11

## 2023-07-11 RX ORDER — TIZANIDINE 2 MG/1
TABLET ORAL
Qty: 90 TABLET | Refills: 1 | Status: SHIPPED | OUTPATIENT
Start: 2023-07-11

## 2023-07-11 RX ORDER — HYDROXYZINE HYDROCHLORIDE 25 MG/1
TABLET, FILM COATED ORAL
Qty: 30 TABLET | Refills: 0 | Status: SHIPPED | OUTPATIENT
Start: 2023-07-11

## 2023-07-11 RX ORDER — HYDROXYZINE HYDROCHLORIDE 25 MG/1
25 TABLET, FILM COATED ORAL EVERY 6 HOURS PRN
Qty: 90 TABLET | Refills: 4 | Status: SHIPPED | OUTPATIENT
Start: 2023-07-11 | End: 2023-11-21

## 2023-07-11 NOTE — TELEPHONE ENCOUNTER
See messages below - can you let the pharmacy know that she would like to fill both meds?  If insurance only covers one she would like the rizatriptan    Thanks    EB

## 2023-07-11 NOTE — TELEPHONE ENCOUNTER
[facility] in [place] called about patients Rizatriptan and sumatriptan.   They are questioning if she should be on both of these medications or just one?    Routing to provider.   Britney Bhat RN on 7/11/2023 at 11:15 AM

## 2023-07-11 NOTE — TELEPHONE ENCOUNTER
I am not sure -are you able to call patient and see which one she prefers?  I can take the other 1 off of her medication list.    Thanks    EB

## 2023-07-11 NOTE — TELEPHONE ENCOUNTER
Call placed to Patient.  Patient states that she takes Rizatriptan when she is nauseated and sumatriptan when she is not nauseated.  States that she does not take them at the same time.  If she had to pick one medication, it would be Rizatriptan.  Luan Nguyen RN

## 2023-07-13 ENCOUNTER — LAB REQUISITION (OUTPATIENT)
Dept: LAB | Facility: CLINIC | Age: 28
End: 2023-07-13
Payer: COMMERCIAL

## 2023-07-13 PROCEDURE — 88307 TISSUE EXAM BY PATHOLOGIST: CPT | Mod: TC,ORL | Performed by: OBSTETRICS & GYNECOLOGY

## 2023-07-13 PROCEDURE — 88305 TISSUE EXAM BY PATHOLOGIST: CPT | Mod: TC,ORL | Performed by: OBSTETRICS & GYNECOLOGY

## 2023-07-13 PROCEDURE — 88305 TISSUE EXAM BY PATHOLOGIST: CPT | Mod: 26 | Performed by: PATHOLOGY

## 2023-07-13 PROCEDURE — 88307 TISSUE EXAM BY PATHOLOGIST: CPT | Mod: 26 | Performed by: PATHOLOGY

## 2023-07-16 LAB
PATH REPORT.COMMENTS IMP SPEC: NORMAL
PATH REPORT.COMMENTS IMP SPEC: NORMAL
PATH REPORT.FINAL DX SPEC: NORMAL
PATH REPORT.GROSS SPEC: NORMAL
PATH REPORT.MICROSCOPIC SPEC OTHER STN: NORMAL
PATH REPORT.RELEVANT HX SPEC: NORMAL
PHOTO IMAGE: NORMAL

## 2023-09-05 ENCOUNTER — VIRTUAL VISIT (OUTPATIENT)
Dept: FAMILY MEDICINE | Facility: CLINIC | Age: 28
End: 2023-09-05
Payer: COMMERCIAL

## 2023-09-05 DIAGNOSIS — M62.89 PELVIC FLOOR DYSFUNCTION IN FEMALE: Primary | ICD-10-CM

## 2023-09-05 PROCEDURE — 99213 OFFICE O/P EST LOW 20 MIN: CPT | Mod: VID | Performed by: FAMILY MEDICINE

## 2023-09-05 NOTE — PROGRESS NOTES
Britney is a 27 year old who is being evaluated via a billable video visit.      How would you like to obtain your AVS? MyChart  If the video visit is dropped, the invitation should be resent by: Text to cell phone: [phone]  Will anyone else be joining your video visit? No      -------------------------    Assessment/Plan:    Britney Singh is a 27 year old female presenting for:    Pelvic floor dysfunction in female  Referral to pelvic floor physical therapy was placed.  I believe that the patient would benefit significantly from some pelvic floor exercises.  She states that she has done these in the past and done very well.  - Physical Therapy Referral        There are no discontinued medications.        Chief Complaint:  Referral          Subjective:   Britney Singh is a pleasant 27 year old female being evaluated via video visit today for the following concern/s:    Pelvic floor dysfunction: Patient has a past medical history significant for pelvic floor dysfunction.  She has seen physical therapy back many years ago.  Patient recently had a hysterectomy for heavy vaginal bleeding and endometriosis.  This was a few months ago.  She states that since the hysterectomy she is having increased difficulty Willerth her pelvic floor.  She notes that occasionally she will have issues with being able to start a stream of urination.  She does not necessarily notice a lot of pelvic pain.  She has been defecating okay.  She has not noticed any leaking.      12 point review of systems completed and negative except for what has been described above    History   Smoking Status    Former    Packs/day: 0.00    Years: 0.00    Types: Cigarettes    Quit date: 6/7/2012   Smokeless Tobacco    Never         Current Outpatient Medications:     albuterol (PROAIR HFA/PROVENTIL HFA/VENTOLIN HFA) 108 (90 Base) MCG/ACT inhaler, Inhale 2 puffs into the lungs every 6 hours as needed for shortness of breath, Disp: 18 g, Rfl: 1     dexmethylphenidate (FOCALIN) 10 MG tablet, Take 10 mg by mouth, Disp: , Rfl:     Diaphragm Arc-Spring (CAYA) DPRH, Place 1 Units vaginally daily as needed, Disp: 1 each, Rfl: 1    hydrOXYzine (ATARAX) 25 MG tablet, TAKE 1 TABLET BY MOUTH EVERY SIX HOURS AS NEEDED FOR FOR ITCHING AND TAKE 1 TABLET BY MOUTH THREE TIMESDAILY AS NEEDED FOR ANXIETY, Disp: 30 tablet, Rfl: 0    hydrOXYzine (ATARAX) 25 MG tablet, Take 1 tablet (25 mg) by mouth every 6 hours as needed for itching, Disp: 90 tablet, Rfl: 4    ondansetron (ZOFRAN) 4 MG tablet, TAKE 1 TABLET BY MOUTH THREE TIMES DAILY AS NEEDED, Disp: 30 tablet, Rfl: 1    prazosin (MINIPRESS) 2 MG capsule, TAKE 4 CAPSULES (8 MG) BY MOUTH AT BEDTIME, Disp: 270 capsule, Rfl: 1    propranolol (INDERAL) 20 MG tablet, 1 tablet Orally Once a day as needed for anxiety, Disp: 90 tablet, Rfl: 3    rizatriptan (MAXALT-MLT) 5 MG ODT, Take 1 tablet (5 mg) by mouth at onset of headache for migraine May repeat in 2 hours. Max 6 tablets/24 hours., Disp: 10 tablet, Rfl: 0    SUMAtriptan (IMITREX) 50 MG tablet, Take 1 tablet (50 mg) by mouth at onset of headache for migraine, Disp: 10 tablet, Rfl: 4    tiZANidine (ZANAFLEX) 2 MG tablet, TAKE 1/2 TO 2 TABLETS BY MOUTH THREE TIMES DAILY, Disp: 90 tablet, Rfl: 1    ALPRAZolam (XANAX) 0.5 MG tablet, Take 1 tablet (0.5 mg) by mouth 3 times daily as needed for anxiety (Patient not taking: Reported on 9/5/2023), Disp: 10 tablet, Rfl: 0    NORLYDA 0.35 MG tablet, TAKE 1 TABLET BY MOUTH DAILY (Patient not taking: Reported on 9/5/2023), Disp: 90 tablet, Rfl: 2        Objective:  No vitals were done due to the nature of this visit       No data to display                        General: No acute distress  Psych: Appropriate affect  HEENT: moist mucous membranes  Pulmonary: Breathing comfortably, speaking in complete sentences  Extremities: warm and well perfused with no edema  Skin: warm and dry with no rash         This note has been dictated and  transcribed using voice recognition software.   Any errors in transcription are unintentional and inherent to the software.    Video-Visit Details    Type of service:  Video Visit   Video Start Time: 1240  Video End Time:1258    Originating Location (pt. Location): Home    Distant Location (provider location):  On-site  Platform used for Video Visit: [employer]idere[employer]

## 2023-09-13 ENCOUNTER — HOSPITAL ENCOUNTER (OUTPATIENT)
Dept: ULTRASOUND IMAGING | Facility: CLINIC | Age: 28
Discharge: HOME | End: 2023-09-13
Attending: FAMILY MEDICINE | Admitting: FAMILY MEDICINE
Payer: COMMERCIAL

## 2023-09-13 DIAGNOSIS — M79.89 MASS OF SOFT TISSUE: ICD-10-CM

## 2023-09-13 PROCEDURE — 76882 US LMTD JT/FCL EVL NVASC XTR: CPT | Mod: LT

## 2023-09-14 DIAGNOSIS — R11.0 NAUSEA: ICD-10-CM

## 2023-09-14 RX ORDER — ONDANSETRON 4 MG/1
TABLET, FILM COATED ORAL
Qty: 30 TABLET | Refills: 1 | Status: SHIPPED | OUTPATIENT
Start: 2023-09-14

## 2023-09-14 NOTE — TELEPHONE ENCOUNTER
"Prescription approved per [facility] Refill Protocol.       Requested Prescriptions   Pending Prescriptions Disp Refills    ondansetron (ZOFRAN) 4 MG tablet [Pharmacy Med Name: ONDANSETRON 4MG TABLET] 30 tablet 1     Sig: TAKE 1 TABLET BY MOUTH THREE TIMES DAILY AS NEEDED        Antivertigo/Antiemetic Agents Passed - 9/14/2023  4:42 PM        Passed - Recent (12 mo) or future (30 days) visit within the authorizing provider's specialty     Patient has had an office visit with the authorizing provider or a provider within the authorizing providers department within the previous 12 mos or has a future within next 30 days. See \"Patient Info\" tab in inbasket, or \"Choose Columns\" in Meds & Orders section of the refill encounter.              Passed - Medication is active on med list        Passed - Patient is 18 years of age or older                 Britney Bhat RN 09/14/23 5:08 PM   "

## 2023-09-28 ENCOUNTER — THERAPY VISIT (OUTPATIENT)
Dept: PHYSICAL THERAPY | Facility: CLINIC | Age: 28
End: 2023-09-28
Attending: FAMILY MEDICINE
Payer: COMMERCIAL

## 2023-09-28 DIAGNOSIS — M62.89 PELVIC FLOOR DYSFUNCTION IN FEMALE: ICD-10-CM

## 2023-09-28 PROCEDURE — 97162 PT EVAL MOD COMPLEX 30 MIN: CPT | Mod: GP | Performed by: PHYSICAL THERAPIST

## 2023-09-28 PROCEDURE — 97110 THERAPEUTIC EXERCISES: CPT | Mod: GP | Performed by: PHYSICAL THERAPIST

## 2023-09-28 PROCEDURE — 97140 MANUAL THERAPY 1/> REGIONS: CPT | Mod: GP | Performed by: PHYSICAL THERAPIST

## 2023-09-28 PROCEDURE — 97530 THERAPEUTIC ACTIVITIES: CPT | Mod: GP | Performed by: PHYSICAL THERAPIST

## 2023-09-28 NOTE — PROGRESS NOTES
PHYSICAL THERAPY EVALUATION  Type of Visit: Evaluation    See electronic medical record for Abuse and Falls Screening details.    Subjective      Presenting condition or subjective complaint: Difficulty relaxing to urinate, painful intimacy, pain when sitting or standing still (walking is okay) - Pt relates had hysteretomy in July due to heavy bleeding and endometriosis. Pt relates since then, her bleeding has improved however she continues to have pain but it is different. Pt relates pain in rectal area with sitting due to endometriosis.  Pt relates pain with intercourse and at times cannot tolerate digital palpation. Pt also has hip/SI pain. Pt denies inconcintence. Pt Has difficulty urinating and relaxing to urinate.     Date of onset: 07/01/23    Relevant medical history: Anemia; Asthma; Depression; Dizziness; Mental Illness; Migraines or headaches; Sleep disorder like apnea   Dates & types of surgery: July 13 2023- hysterectomy w/ bilateral salpingectomy and endometriosis excision    Prior diagnostic imaging/testing results:       Prior therapy history for the same diagnosis, illness or injury: No        Living Environment  Social support: With family members   Type of home: House   Stairs to enter the home: Yes 3 Is there a railing: No   Ramp: No   Stairs inside the home: Yes 6 Is there a railing: Yes   Help at home: Self Cares (home health aide/personal care attendant, family, etc); Home and Yard maintenance tasks  Equipment owned: Straight Cane; Grab bars     Employment: No    Hobbies/Interests: Reading, board games, nature    Patient goals for therapy: Be able to use the bathroom more easily, learn how to safely move my body post hysterectomy, have less daily pain         Objective        PELVIC EVALUATION  ADDITIONAL HISTORY:  Sex assigned at birth: Female  Gender identity: Female    Pronouns: She/Her Hers      Bladder History:  Feels bladder filling: No  Triggers for feeling of inability to wait to go to  the bathroom: No    How long can you wait to urinate: Usually a few hours, caffeine can irritate it and decrease waiting time to an hour  Gets up at night to urinate: No    Can stop the flow of urine when urinating: Yes  Volume of urine usually released: Average   Other issues: Straining to pass urine; Slow or hesitant urine stream; Trouble emptying bladder completely  Number of bladder infections in last 12 months:    Fluid intake per day: 64 oz, more if needed      Medications taken for bladder: No     Activities causing urine leak: Other activities Jump rope, have not tried jumping post hysterectomy, trampoline jumping pre op did not cause leakage so it seems to be only jumping rope that causes leakage  Amount of urine typically leaked: Very small amount that cotton underwear can hold  Pads used to help with leaking: No        Bowel History:  Frequency of bowel movement: 1-2 times a day  Consistency of stool: Soft-formed    Ignores the urge to defecate: No  Other bowel issues: Pain when pooping  Length of time spent trying to have a bowel movement:      Sexual Function History:  Sexual orientation: Prefer not to say    Sexually active: Yes  Lubrication used: Yes Yes  Pelvic pain: Standing; Sitting; Certain positions; Initial penetration (rectal or vaginal); Deep penetration (rectal or vaginal); Pelvic exams; Rectal exams Driving, kneeling, lunging or squatting type movements.  Pain or difficulty with orgasms/erection/ejaculation: No    State of menopause: Perimenopause (have not gone through menopause yet)  Hormone medications: No      Are you currently pregnant: No, Number of previous pregnancies: 2, Number of deliveries: 1, If you have delivered before, did you have any of these issues during delivery: Tearing; Vaginal delivery, Have you been diagnosed with pelvic prolapse or abdominal separation: No, Do you get regular exercise: Yes, I do this type of exercise: Walking, light weights with strength training.  pre op I did yoga and pilates as well, Have you tried pelvic floor strengthening exercises for 4 weeks: Yes, Do you have any history of trauma that is relevant to your care that you d like to share: Yes, I d like to discuss it with my provider in person.    Discussed reason for referral regarding pelvic health needs and external/internal pelvic floor muscle examination with patient/guardian.  Opportunity provided to ask questions and verbal consent for assessment and intervention was given.    PAIN: Pt relates pelvic pain and B SI pain and rectal pain as noted above      LUMBAR SCREEN:   (Degrees) Left AROM Left PROM  Right AROM Right PROM   Hip Flexion       Hip Extension       Hip Abduction       Hip Adduction       Hip Internal Rotation       Hip External Rotation       Knee Flexion       Knee Extension       Lumbar Side glide     Lumbar Flexion To floor w min pain in abdomen     Lumbar Extension 100%   Hip ROM: WNL    HIP SCREEN:  Strength:   Hip strength: IR/ER: 3/5    PELVIC/SI SCREEN:  R posteior innominate rotation       PELVIS/SI SPECIAL TESTS:   SLR: R: 90 deg  L: 70 deg     Abodinal incision: min scar tissue noted on abdominal incisions   L hip looser,  - pain with abd,  L hip pain with IR  - L 4/5, R 3/5     Abdominal strength   Dl loweirn deg   Sit up: able to complete - no increased pain        Assessment & Plan   CLINICAL IMPRESSIONS   Medical Diagnosis: Pelvic floor dysfunction in female    Treatment Diagnosis: PFM tightness and pain   Impression/Assessment: Patient is a 27 year old female with  complaints of pelvic floor muscle tightness and pain.  The following significant findings have been identified: Pain, Decreased ROM/flexibility, Decreased joint mobility, Decreased strength, and Impaired balance. These impairments interfere with their ability to perform self care tasks, work tasks, recreational activities, and driving  as compared to previous level of function.     Clinical Decision  Making (Complexity):   Clinical Presentation: Evolving/Changing  Clinical Presentation Rationale: based on medical and personal factors listed in PT evaluation  Clinical Decision Making (Complexity): Moderate complexity    PLAN OF CARE  Treatment Interventions:  Modalities: Biofeedback, Cryotherapy, E-stim  Interventions: Gait Training, Manual Therapy, Neuromuscular Re-education, Therapeutic Activity, Therapeutic Exercise, Self-Care/Home Management    Long Term Goals     PT Goal 1  Goal Identifier: urination - relxation  Goal Description: Pt will be able to urinate w/o straining 75% of the time.  Target Date: 11/09/23  PT Goal 2  Goal Identifier: pelvic pain  Goal Description: Pt will relate less than 1/10 pelvic pain w intercourse  Target Date: 11/09/23  PT Goal 3  Goal Identifier: strength  Goal Description: Pt will demonstrate 4/5 hip strength to improve stability and reduce LBP  Target Date: 12/21/23  PT Goal 4  Goal Identifier: HEP  Goal Description: Pt will be ind in HEP to prevent return of symptoms  Target Date: 12/21/23      Frequency of Treatment: 1x/week  Duration of Treatment: 12 weeks      Education Assessment:   Learner/Method: Patient    Risks and benefits of evaluation/treatment have been explained.   Patient/Family/caregiver agrees with Plan of Care.     Evaluation Time:     PT Eval, Moderate Complexity Minutes (82095): 20      Signing Clinician: Kala Garcia, PT        [facility]                                                                                   OUTPATIENT PHYSICAL THERAPY      PLAN OF TREATMENT FOR OUTPATIENT REHABILITATION   Patient's Last Name, First Name, Britney Abdullahi YOB: 1995   Provider's Name   [facility]   Medical Record No.  [id number]     Onset Date: 07/01/23  Start of Care Date: 09/28/23     Medical Diagnosis:  Pelvic floor dysfunction in female      PT Treatment Diagnosis:  PFM  tightness and pain Plan of Treatment  Frequency/Duration: 1x/week/ 12 weeks    Certification date from 09/28/23 to 12/21/23         See note for plan of treatment details and functional goals     Kala Garcia, PT                         I CERTIFY THE NEED FOR THESE SERVICES FURNISHED UNDER        THIS PLAN OF TREATMENT AND WHILE UNDER MY CARE     (Physician attestation of this document indicates review and certification of the therapy plan).                Referring Provider:  Anushka Arnold      Initial Assessment  See Epic Evaluation- Start of Care Date: 09/28/23

## 2023-10-03 ENCOUNTER — IMMUNIZATION (OUTPATIENT)
Dept: FAMILY MEDICINE | Facility: CLINIC | Age: 28
End: 2023-10-03
Payer: COMMERCIAL

## 2023-10-03 DIAGNOSIS — Z23 HIGH PRIORITY FOR 2019-NCOV VACCINE: Primary | ICD-10-CM

## 2023-10-03 PROCEDURE — 91320 SARSCV2 VAC 30MCG TRS-SUC IM: CPT

## 2023-10-03 PROCEDURE — 90480 ADMN SARSCOV2 VAC 1/ONLY CMP: CPT

## 2023-10-11 ENCOUNTER — THERAPY VISIT (OUTPATIENT)
Dept: PHYSICAL THERAPY | Facility: CLINIC | Age: 28
End: 2023-10-11
Attending: FAMILY MEDICINE
Payer: COMMERCIAL

## 2023-10-11 DIAGNOSIS — M62.89 PELVIC FLOOR DYSFUNCTION IN FEMALE: Primary | ICD-10-CM

## 2023-10-11 PROCEDURE — 97110 THERAPEUTIC EXERCISES: CPT | Mod: GP | Performed by: PHYSICAL THERAPIST

## 2023-10-11 PROCEDURE — 97140 MANUAL THERAPY 1/> REGIONS: CPT | Mod: GP | Performed by: PHYSICAL THERAPIST

## 2023-10-17 ENCOUNTER — IMMUNIZATION (OUTPATIENT)
Dept: FAMILY MEDICINE | Facility: CLINIC | Age: 28
End: 2023-10-17
Payer: COMMERCIAL

## 2023-10-17 ENCOUNTER — THERAPY VISIT (OUTPATIENT)
Dept: PHYSICAL THERAPY | Facility: CLINIC | Age: 28
End: 2023-10-17
Attending: FAMILY MEDICINE
Payer: COMMERCIAL

## 2023-10-17 DIAGNOSIS — M62.89 PELVIC FLOOR DYSFUNCTION IN FEMALE: Primary | ICD-10-CM

## 2023-10-17 DIAGNOSIS — Z23 NEED FOR PROPHYLACTIC VACCINATION AND INOCULATION AGAINST INFLUENZA: Primary | ICD-10-CM

## 2023-10-17 PROCEDURE — 90471 IMMUNIZATION ADMIN: CPT

## 2023-10-17 PROCEDURE — 99207 PR NO CHARGE NURSE ONLY: CPT

## 2023-10-17 PROCEDURE — 90686 IIV4 VACC NO PRSV 0.5 ML IM: CPT

## 2023-10-17 PROCEDURE — 97110 THERAPEUTIC EXERCISES: CPT | Mod: GP | Performed by: PHYSICAL THERAPIST

## 2023-10-17 PROCEDURE — 97140 MANUAL THERAPY 1/> REGIONS: CPT | Mod: GP | Performed by: PHYSICAL THERAPIST

## 2023-10-18 DIAGNOSIS — F43.12 NIGHTMARES ASSOCIATED WITH CHRONIC POST-TRAUMATIC STRESS DISORDER: ICD-10-CM

## 2023-10-18 DIAGNOSIS — F51.5 NIGHTMARES ASSOCIATED WITH CHRONIC POST-TRAUMATIC STRESS DISORDER: ICD-10-CM

## 2023-10-18 RX ORDER — PRAZOSIN HYDROCHLORIDE 2 MG/1
CAPSULE ORAL
Qty: 270 CAPSULE | Refills: 1 | Status: SHIPPED | OUTPATIENT
Start: 2023-10-18 | End: 2023-11-21

## 2023-11-07 ENCOUNTER — THERAPY VISIT (OUTPATIENT)
Dept: PHYSICAL THERAPY | Facility: CLINIC | Age: 28
End: 2023-11-07
Attending: FAMILY MEDICINE
Payer: COMMERCIAL

## 2023-11-07 DIAGNOSIS — M62.89 PELVIC FLOOR DYSFUNCTION IN FEMALE: Primary | ICD-10-CM

## 2023-11-07 PROCEDURE — 97140 MANUAL THERAPY 1/> REGIONS: CPT | Mod: GP | Performed by: PHYSICAL THERAPIST

## 2023-11-07 PROCEDURE — 97110 THERAPEUTIC EXERCISES: CPT | Mod: GP | Performed by: PHYSICAL THERAPIST

## 2023-11-14 ENCOUNTER — THERAPY VISIT (OUTPATIENT)
Dept: PHYSICAL THERAPY | Facility: CLINIC | Age: 28
End: 2023-11-14
Attending: FAMILY MEDICINE
Payer: COMMERCIAL

## 2023-11-14 DIAGNOSIS — M62.89 PELVIC FLOOR DYSFUNCTION IN FEMALE: Primary | ICD-10-CM

## 2023-11-14 PROCEDURE — 97140 MANUAL THERAPY 1/> REGIONS: CPT | Mod: GP | Performed by: PHYSICAL THERAPIST

## 2023-11-14 PROCEDURE — 97110 THERAPEUTIC EXERCISES: CPT | Mod: GP | Performed by: PHYSICAL THERAPIST

## 2023-11-20 PROBLEM — F90.9 ATTENTION DEFICIT HYPERACTIVITY DISORDER: Status: ACTIVE | Noted: 2023-05-26

## 2023-11-20 PROBLEM — G47.30 SLEEP APNEA: Status: ACTIVE | Noted: 2023-05-26

## 2023-11-20 RX ORDER — DROSPIRENONE 4 MG/1
1 TABLET, FILM COATED ORAL
COMMUNITY
Start: 2023-09-06 | End: 2023-11-20

## 2023-11-20 RX ORDER — TRAZODONE HYDROCHLORIDE 50 MG/1
50 TABLET, FILM COATED ORAL DAILY
COMMUNITY
End: 2024-06-25

## 2023-11-20 RX ORDER — CHOLECALCIFEROL (VITAMIN D3) 1250 MCG
1250 CAPSULE ORAL
COMMUNITY
Start: 2023-10-18

## 2023-11-20 RX ORDER — NALTREXONE HYDROCHLORIDE 50 MG/1
50 TABLET, FILM COATED ORAL DAILY
COMMUNITY
End: 2023-11-20

## 2023-11-20 RX ORDER — LAMOTRIGINE 100 MG/1
1 TABLET, EXTENDED RELEASE ORAL
COMMUNITY
Start: 2023-10-27 | End: 2024-06-25

## 2023-11-20 ASSESSMENT — ENCOUNTER SYMPTOMS
COUGH: 0
DYSURIA: 0
DIARRHEA: 0
CONSTIPATION: 0
EYE PAIN: 0
DIZZINESS: 0
HEARTBURN: 0
SORE THROAT: 0
HEADACHES: 0
SHORTNESS OF BREATH: 0
HEMATOCHEZIA: 0
HEMATURIA: 0
FEVER: 0
WEAKNESS: 0
FREQUENCY: 0
ARTHRALGIAS: 1
BREAST MASS: 0
JOINT SWELLING: 0
PALPITATIONS: 0
MYALGIAS: 1
ABDOMINAL PAIN: 0
CHILLS: 0
PARESTHESIAS: 0
NAUSEA: 0
NERVOUS/ANXIOUS: 1

## 2023-11-20 ASSESSMENT — PATIENT HEALTH QUESTIONNAIRE - PHQ9
10. IF YOU CHECKED OFF ANY PROBLEMS, HOW DIFFICULT HAVE THESE PROBLEMS MADE IT FOR YOU TO DO YOUR WORK, TAKE CARE OF THINGS AT HOME, OR GET ALONG WITH OTHER PEOPLE: EXTREMELY DIFFICULT
SUM OF ALL RESPONSES TO PHQ QUESTIONS 1-9: 20
SUM OF ALL RESPONSES TO PHQ QUESTIONS 1-9: 20

## 2023-11-20 ASSESSMENT — ASTHMA QUESTIONNAIRES: ACT_TOTALSCORE: 23

## 2023-11-21 ENCOUNTER — OFFICE VISIT (OUTPATIENT)
Dept: FAMILY MEDICINE | Facility: CLINIC | Age: 28
End: 2023-11-21
Payer: COMMERCIAL

## 2023-11-21 VITALS
OXYGEN SATURATION: 100 % | HEART RATE: 77 BPM | DIASTOLIC BLOOD PRESSURE: 82 MMHG | RESPIRATION RATE: 16 BRPM | SYSTOLIC BLOOD PRESSURE: 118 MMHG | BODY MASS INDEX: 27.95 KG/M2 | TEMPERATURE: 97.9 F | WEIGHT: 195.25 LBS | HEIGHT: 70 IN

## 2023-11-21 DIAGNOSIS — F51.5 NIGHTMARES ASSOCIATED WITH CHRONIC POST-TRAUMATIC STRESS DISORDER: ICD-10-CM

## 2023-11-21 DIAGNOSIS — F43.12 NIGHTMARES ASSOCIATED WITH CHRONIC POST-TRAUMATIC STRESS DISORDER: ICD-10-CM

## 2023-11-21 DIAGNOSIS — Z00.00 ROUTINE GENERAL MEDICAL EXAMINATION AT A HEALTH CARE FACILITY: Primary | ICD-10-CM

## 2023-11-21 DIAGNOSIS — Z90.710 HX OF VAGINAL HYSTERECTOMY: ICD-10-CM

## 2023-11-21 PROCEDURE — 99213 OFFICE O/P EST LOW 20 MIN: CPT | Mod: 25 | Performed by: FAMILY MEDICINE

## 2023-11-21 PROCEDURE — 99395 PREV VISIT EST AGE 18-39: CPT | Performed by: FAMILY MEDICINE

## 2023-11-21 PROCEDURE — G0145 SCR C/V CYTO,THINLAYER,RESCR: HCPCS | Performed by: FAMILY MEDICINE

## 2023-11-21 RX ORDER — PRAZOSIN HYDROCHLORIDE 2 MG/1
10 CAPSULE ORAL AT BEDTIME
Qty: 450 CAPSULE | Refills: 3 | Status: SHIPPED | OUTPATIENT
Start: 2023-11-21 | End: 2024-06-25

## 2023-11-21 ASSESSMENT — ENCOUNTER SYMPTOMS
CHILLS: 0
BREAST MASS: 0
HEADACHES: 0
SORE THROAT: 0
FEVER: 0
SHORTNESS OF BREATH: 0
ABDOMINAL PAIN: 0
HEARTBURN: 0
DIZZINESS: 0
DIARRHEA: 0
MYALGIAS: 1
ARTHRALGIAS: 1
PALPITATIONS: 0
HEMATURIA: 0
HEMATOCHEZIA: 0
WEAKNESS: 0
FREQUENCY: 0
PARESTHESIAS: 0
JOINT SWELLING: 0
COUGH: 0
NAUSEA: 0
CONSTIPATION: 0
DYSURIA: 0
NERVOUS/ANXIOUS: 1
EYE PAIN: 0

## 2023-11-21 NOTE — PROGRESS NOTES
SUBJECTIVE:   Britney is a 27 year old, presenting for the following:  Physical (Pap smear due- check to see how everything has healed up after hysterectomy in July 2023) and Recheck Medication (Discuss adjusting dose of a medication)    Healthy Habits:     Getting at least 3 servings of Calcium per day:  Yes    Bi-annual eye exam:  Yes    Dental care twice a year:  Yes    Sleep apnea or symptoms of sleep apnea:  Sleep apnea    Diet:  Regular (no restrictions)    Frequency of exercise:  6-7 days/week    Duration of exercise:  30-45 minutes    Taking medications regularly:  Yes    Medication side effects:  None    Additional concerns today:  No      Today's PHQ-9 Score:       11/20/2023     2:50 PM   PHQ-9 SCORE   PHQ-9 Total Score MyChart 20 (Severe depression)   PHQ-9 Total Score 20           Initially has unfortunately been having a bit of a difficult time with her mental health recently.  She does have a history of depression and anxiety.  She had a hysterectomy about 4 months ago and this was more difficult than she thought it would be emotionally.  She states that she believes her hormones were a bit thrown off.  She is seeing a psychiatrist who has her on Lamictal.  She was doing ketamine treatments but did not agree with the therapist at that clinic and so has stopped so far.    She notes that her nightmares have worsened.  She currently takes prazosin for the nightmares which works well.  She takes 8 mg at night but wonders if she could increase this.    Otherwise, history of ADHD currently taking Focalin 10 mg daily.    She feels as though she has a good support system with her boyfriend and therapist.    Have you ever done Advance Care Planning? (For example, a Health Directive, POLST, or a discussion with a medical provider or your loved ones about your wishes): No, advance care planning information given to patient to review.  Patient plans to discuss their wishes with loved ones or provider.       Social History     Tobacco Use    Smoking status: Former     Packs/day: 0.00     Years: 0.00     Additional pack years: 0.00     Total pack years: 0.00     Types: Cigarettes     Quit date: 2012     Years since quittin.4    Smokeless tobacco: Never   Substance Use Topics    Alcohol use: Not Currently     Comment: has in the past             2023     2:52 PM   Alcohol Use   Prescreen: >3 drinks/day or >7 drinks/week? Not Applicable     Reviewed orders with patient.  Reviewed health maintenance and updated orders accordingly - Yes  Lab work is in process    Breast Cancer Screening:        10/3/2021     8:20 AM 2022     2:19 PM   Breast CA Risk Assessment (FHS-7)   Do you have a family history of breast, colon, or ovarian cancer? Yes No / Unknown       click delete button to remove this line now  Patient under 40 years of age: Routine Mammogram Screening not recommended.   Pertinent mammograms are reviewed under the imaging tab.    History of abnormal Pap smear: Status post benign hysterectomy. Health Maintenance and Surgical History updated.      10/6/2021    11:13 AM 3/7/2018    12:22 PM 1/15/2015     3:14 PM   PAP / HPV   PAP Negative for Intraepithelial Lesion or Malignancy (NILM)  Negative for squamous intraepithelial lesion or malignancy  Electronically signed by Kala Felder CT (ASCP) on 3/9/2018 at 11:30 AM    Negative for squamous intraepithelial lesion or malignancy  Electronically signed by Viviana Alfonso CT (ASCP) on 2015 at 12:45 PM        Reviewed and updated as needed this visit by clinical staff    Allergies  Meds              Reviewed and updated as needed this visit by Provider                     Review of Systems   Constitutional:  Negative for chills and fever.   HENT:  Positive for ear pain. Negative for congestion, hearing loss and sore throat.    Eyes:  Negative for pain and visual disturbance.   Respiratory:  Negative for cough and shortness of breath.   "  Cardiovascular:  Negative for chest pain, palpitations and peripheral edema.   Gastrointestinal:  Negative for abdominal pain, constipation, diarrhea, heartburn, hematochezia and nausea.   Breasts:  Negative for tenderness, breast mass and discharge.   Genitourinary:  Positive for pelvic pain. Negative for dysuria, frequency, genital sores, hematuria, urgency, vaginal bleeding and vaginal discharge.   Musculoskeletal:  Positive for arthralgias and myalgias. Negative for joint swelling.   Skin:  Negative for rash.   Neurological:  Negative for dizziness, weakness, headaches and paresthesias.   Psychiatric/Behavioral:  Negative for mood changes. The patient is nervous/anxious.           OBJECTIVE:   /82   Pulse 77   Temp 97.9  F (36.6  C) (Tympanic)   Resp 16   Ht 1.765 m (5' 9.5\")   Wt 88.6 kg (195 lb 4 oz)   LMP  (LMP Unknown)   SpO2 100%   BMI 28.42 kg/m    Physical Exam    Physical Exam:  General Appearance: Alert, cooperative, no distress, appears stated age   Head: Normocephalic, without obvious abnormality, atraumatic  Eyes: PERRL, conjunctiva/corneas clear, EOM's intact   Ears: Normal TM's and external ear canals, both ears  Nose:Nares normal, septum midline,mucosa normal, no drainage    Throat:Lips, mucosa, and tongue normal; teeth and gums normal  Neck: Supple, symmetrical, trachea midline, no adenopathy;  thyroid: not enlarged, symmetric, no tenderness/mass/nodules  Back: Symmetric, no curvature, ROM normal,  Lungs: Clear to auscultation bilaterally, respirations unlabored  Breasts: No breast masses, tenderness, asymmetry, or nipple discharge.  Heart: Regular rate and rhythm, S1 and S2 normal, no murmur, rub, or gallop  Abdomen: Soft, non-tender, bowel sounds active all four quadrants,  no masses, no organomegaly  Pelvic:normal external female genitalia, normal appearing vaginal mucosa, cervix is surgically absent   extremities: Extremities normal, atraumatic, no cyanosis or edema  Skin: " "Skin color, texture, turgor normal, no rashes or lesions  Lymph nodes: Cervical, supraclavicular, and axillary nodes normal and   Neurologic: Normal          ASSESSMENT/PLAN:   1. Routine general medical examination at a health care facility  Vaginal swab - will no longer needs PAPs  - Pap screen reflex to HPV if ASCUS - recommend age 25 - 29    2. Nightmares associated with chronic post-traumatic stress disorder  Increase to 10 mg at night.  - prazosin (MINIPRESS) 2 MG capsule; Take 5 capsules (10 mg) by mouth at bedtime  Dispense: 450 capsule; Refill: 3    3. Hx of vaginal hysterectomy  - Pap screen reflex to HPV if ASCUS - recommend age 25 - 29      Patient has been advised of split billing requirements and indicates understanding: Yes    Depression Screening Follow Up        11/20/2023     2:50 PM   PHQ   PHQ-9 Total Score 20   Q9: Thoughts of better off dead/self-harm past 2 weeks More than half the days   F/U: Thoughts of suicide or self-harm No   F/U: Safety concerns No         11/20/2023     2:50 PM   Last PHQ-9   1.  Little interest or pleasure in doing things 2   2.  Feeling down, depressed, or hopeless 2   3.  Trouble falling or staying asleep, or sleeping too much 3   4.  Feeling tired or having little energy 3   5.  Poor appetite or overeating 2   6.  Feeling bad about yourself 3   7.  Trouble concentrating 3   8.  Moving slowly or restless 0   Q9: Thoughts of better off dead/self-harm past 2 weeks 2   PHQ-9 Total Score 20   In the past two weeks have you had thoughts of suicide or self harm? No   Do you have concerns about your personal safety or the safety of others? No     Patient follows with a therapist and psychiatrist.  No plan for self-harm.  COUNSELING:  Reviewed preventive health counseling, as reflected in patient instructions      BMI:   Estimated body mass index is 28.42 kg/m  as calculated from the following:    Height as of this encounter: 1.765 m (5' 9.5\").    Weight as of this " encounter: 88.6 kg (195 lb 4 oz).   Weight management plan: Discussed healthy diet and exercise guidelines      She reports that she quit smoking about 11 years ago. Her smoking use included cigarettes. She has never used smokeless tobacco.          Anushka Arnold MD  [facility]

## 2023-11-27 LAB
BKR LAB AP GYN ADEQUACY: NORMAL
BKR LAB AP GYN INTERPRETATION: NORMAL
BKR LAB AP HPV REFLEX: NORMAL
BKR LAB AP PREVIOUS ABNORMAL: NORMAL
PATH REPORT.COMMENTS IMP SPEC: NORMAL
PATH REPORT.COMMENTS IMP SPEC: NORMAL
PATH REPORT.RELEVANT HX SPEC: NORMAL

## 2023-11-28 ENCOUNTER — THERAPY VISIT (OUTPATIENT)
Dept: PHYSICAL THERAPY | Facility: CLINIC | Age: 28
End: 2023-11-28
Attending: FAMILY MEDICINE
Payer: COMMERCIAL

## 2023-11-28 DIAGNOSIS — M62.89 PELVIC FLOOR DYSFUNCTION IN FEMALE: Primary | ICD-10-CM

## 2023-11-28 PROCEDURE — 97140 MANUAL THERAPY 1/> REGIONS: CPT | Mod: GP | Performed by: PHYSICAL THERAPIST

## 2023-11-28 PROCEDURE — 97110 THERAPEUTIC EXERCISES: CPT | Mod: GP | Performed by: PHYSICAL THERAPIST

## 2023-12-07 ENCOUNTER — THERAPY VISIT (OUTPATIENT)
Dept: PHYSICAL THERAPY | Facility: CLINIC | Age: 28
End: 2023-12-07
Attending: FAMILY MEDICINE
Payer: COMMERCIAL

## 2023-12-07 DIAGNOSIS — M62.89 PELVIC FLOOR DYSFUNCTION IN FEMALE: Primary | ICD-10-CM

## 2023-12-07 PROCEDURE — 97110 THERAPEUTIC EXERCISES: CPT | Mod: GP | Performed by: PHYSICAL THERAPIST

## 2023-12-07 PROCEDURE — 97140 MANUAL THERAPY 1/> REGIONS: CPT | Mod: GP | Performed by: PHYSICAL THERAPIST

## 2024-01-03 ENCOUNTER — THERAPY VISIT (OUTPATIENT)
Dept: PHYSICAL THERAPY | Facility: CLINIC | Age: 29
End: 2024-01-03
Attending: FAMILY MEDICINE
Payer: COMMERCIAL

## 2024-01-03 DIAGNOSIS — M62.89 PELVIC FLOOR DYSFUNCTION IN FEMALE: Primary | ICD-10-CM

## 2024-01-03 PROCEDURE — 97140 MANUAL THERAPY 1/> REGIONS: CPT | Mod: GP | Performed by: PHYSICAL THERAPIST

## 2024-01-03 PROCEDURE — 97110 THERAPEUTIC EXERCISES: CPT | Mod: GP | Performed by: PHYSICAL THERAPIST

## 2024-01-03 NOTE — PROGRESS NOTES
01/03/24 0500   Appointment Info   Signing clinician's name / credentials Kala Garcia, PT, DPT   Visits Used 8   Medical Diagnosis Pelvic floor dysfunction in female   PT Tx Diagnosis PFM tightness and pain   Quick Adds Certification;Pelvic Consent   Progress Note/Certification   Start of Care Date 09/28/23   Onset of illness/injury or Date of Surgery 07/01/23   Therapy Frequency 1x/ every 2 weeks week   Predicted Duration 6 weeks   Certification date from 12/21/23   Certification date to 02/07/24   Progress Note Due Date 12/21/23   Progress Note Completed Date 09/28/23   GOALS   PT Goals 2;3;4   PT Goal 1   Goal Identifier urination - relxation   Goal Description Pt will be able to urinate w/o straining 75% of the time.   Goal Progress feels like it is going a lot better. Straining 1x/day   Target Date 11/09/23   PT Goal 2   Goal Identifier pelvic pain   Goal Description Pt will relate less than 1/10 pelvic pain w intercourse   Goal Progress 7/10 w/o intercoruse this past week   Target Date 11/09/23   PT Goal 3   Goal Identifier strength   Goal Description Pt will demonstrate 4/5 hip strength to improve stability and reduce LBP   Goal Progress goal met - having mild R hip impingement symptoms   Target Date 12/21/23   PT Goal 4   Goal Identifier HEP   Goal Description Pt will be ind in HEP to prevent return of symptoms   Goal Progress going okay, sometimes hips are sore   Target Date 12/21/23   Subjective Report   Subjective Report Pt relates more pain in between B SI with standing and walking. Pt relates pelvic pain hasnt been horrible. She has been doing MFR and icing at home.   Objective Measures   Objective Measures Objective Measure 1;Objective Measure 3;Objective Measure 2;Objective Measure 4;Objective Measure 5;Objective Measure 6   Objective Measure 1   Objective Measure Lumbar ROM   Details flex: hands flat on floor w pain ext: 100% w/o pain   Objective Measure 2   Objective Measure special test    Details Nikki: positive on R, scour: negative,  FAIDER: positve on R, Hip ROM: WFL - mild pain with ER and adduction   Objective Measure 3   Objective Measure Palpation   Details TTP R SI, pirirofmris, and bursa   Objective Measure 4   Objective Measure MMT   Details hip flex: 4+/5 w mild soreness in B illiopsoaas hip abd: 4/5 B  hip ER: 4/5 B Hip IR:R 4/5.  3/5   Objective Measure 5   Objective Measure AUA   Details 13   Objective Measure 6   Objective Measure DL lowering   Details 45 deg w pain in tailbone   Treatment Interventions (PT)   Interventions Therapeutic Procedure/Exercise;Therapeutic Activity;Manual Therapy   Therapeutic Procedure/Exercise   Therapeutic Procedures: strength, endurance, ROM, flexibillity minutes (89892) 23   Therapeutic Procedures Ther Proc 2;Ther Proc 3;Ther Proc 5;Ther Proc 4;Ther Proc 6;Ther Proc 7;Ther Proc 8;Ther Proc 9;Ther Proc 10   Ther Proc 1 d/c self muscle release   Ther Proc 3 hundreds to level 3  x 10   Ther Proc 4 planks -   Ther Proc 4 - Details x 1   Ther Proc 5 side planks x 2   Ther Proc 6 Fit on - phone norman - focus on pilates/strengthe   Ther Proc 7 extened time for objective measures   Skilled Intervention improve mobility and strength   Patient Response/Progress demonstrates understanding   Therapeutic Activity   Therapeutic Activities Ther Act 2;Ther Act 3;Ther Act 4;Ther Act 5   Manual Therapy   Manual Therapy: Mobilization, MFR, MLD, friction massage minutes (83321) 24   Manual Therapy Manual Therapy 2;Manual Therapy 4;Manual Therapy 3;Manual Therapy 5;Manual Therapy 6;Manual Therapy 7;Manual Therapy 8;Manual Therapy 9   Manual Therapy 1 self - MET for L posterior innominate rotation   Manual Therapy 1 - Details no change in pain but neutral pelvis   Manual Therapy 2 IASTM to B ITB/quads   Manual Therapy 3 B piriformis release   Skilled Intervention improve mobility, reduce pain   Patient Response/Progress feels better after   Manual Therapy 3 - Details R  tighter than L   Education   Learner/Method Patient   Plan   Home program [id number]   Plan for next session check hips, MFR, illiopsoas, check abdominals, lunge,s, seated on ball, preying mantis, biofeedback w sticks   Comments   Comments 1x/week - Pt has been seen for 8 visits over this POC. In that time, pt demonstrates improved hip and core strength but continues to have moderate pelvic pain. Focus has been more on external manual therapy and strengthening as pt would prefer to avoid internal exam. Due to improvements, plan to continue PT but pt is also encouraged to f/u with MD due to continued pain. Pt may benefit from futher imagaing of lumbar spine, hip and to find out if pt is fully emptying.   Pelvic Health Informed Consent Statement Discussed with patient/guardian reason for referral regarding pelvic health needs and external/internal pelvic floor muscle examination.  Opportunity provided to ask questions and verbal consent for assessment and intervention was given.   Total Session Time   Timed Code Treatment Minutes 47   Total Treatment Time (sum of timed and untimed services) 47         [facility]                                                                                   OUTPATIENT PHYSICAL THERAPY    PLAN OF TREATMENT FOR OUTPATIENT REHABILITATION   Patient's Last Name, First Name, THIERNOXI  Britney Singh  PAULINO YOB: 1995   Provider's Name   [facility]   Medical Record No.  [id number]     Onset Date: 07/01/23  Start of Care Date: 09/28/23     Medical Diagnosis:  Pelvic floor dysfunction in female      PT Treatment Diagnosis:  PFM tightness and pain Plan of Treatment  Frequency/Duration: 1x/ every 2 weeks week/ 6 weeks    Certification date from 12/21/23 to 02/07/24         See note for plan of treatment details and functional goals     Kala Garcia, PT                         I CERTIFY THE NEED FOR THESE SERVICES FURNISHED  UNDER        THIS PLAN OF TREATMENT AND WHILE UNDER MY CARE .             Physician Signature               Date    X_____________________________________________________                  Referring Provider:  Anushka Arnold    Initial Assessment  See Epic Evaluation- Start of Care Date: 09/28/23            PLAN  Continue therapy per current plan of care.    Beginning/End Dates of Progress Note Reporting Period:  09/28/23 to 01/03/2024    Referring Provider:  Anushka Arnold

## 2024-01-15 ASSESSMENT — PATIENT HEALTH QUESTIONNAIRE - PHQ9
SUM OF ALL RESPONSES TO PHQ QUESTIONS 1-9: 20
SUM OF ALL RESPONSES TO PHQ QUESTIONS 1-9: 20
10. IF YOU CHECKED OFF ANY PROBLEMS, HOW DIFFICULT HAVE THESE PROBLEMS MADE IT FOR YOU TO DO YOUR WORK, TAKE CARE OF THINGS AT HOME, OR GET ALONG WITH OTHER PEOPLE: EXTREMELY DIFFICULT

## 2024-01-16 ENCOUNTER — VIRTUAL VISIT (OUTPATIENT)
Dept: FAMILY MEDICINE | Facility: CLINIC | Age: 29
End: 2024-01-16
Payer: COMMERCIAL

## 2024-01-16 DIAGNOSIS — G89.29 CHRONIC BILATERAL LOW BACK PAIN WITHOUT SCIATICA: ICD-10-CM

## 2024-01-16 DIAGNOSIS — M25.552 HIP PAIN, LEFT: ICD-10-CM

## 2024-01-16 DIAGNOSIS — F33.1 MODERATE EPISODE OF RECURRENT MAJOR DEPRESSIVE DISORDER (H): Primary | ICD-10-CM

## 2024-01-16 DIAGNOSIS — M54.50 CHRONIC BILATERAL LOW BACK PAIN WITHOUT SCIATICA: ICD-10-CM

## 2024-01-16 PROCEDURE — 99214 OFFICE O/P EST MOD 30 MIN: CPT | Mod: 95 | Performed by: FAMILY MEDICINE

## 2024-01-16 RX ORDER — PREDNISONE 20 MG/1
20 TABLET ORAL 2 TIMES DAILY
Qty: 20 TABLET | Refills: 0 | Status: SHIPPED | OUTPATIENT
Start: 2024-01-16 | End: 2024-02-13

## 2024-01-16 RX ORDER — NALTREXONE HYDROCHLORIDE 50 MG/1
TABLET, FILM COATED ORAL
COMMUNITY
Start: 2024-01-12 | End: 2024-02-13

## 2024-01-16 NOTE — PROGRESS NOTES
Britney is a 28 year old who is being evaluated via a billable video visit.      How would you like to obtain your AVS? MyChart  If the video visit is dropped, the invitation should be resent by: Text to cell phone: [phone]  Will anyone else be joining your video visit? No      -------------------------    Assessment/Plan:    Britney Singh is a 28 year old female presenting for:    Moderate episode of recurrent major depressive disorder (H)  Stable at this time.  Continue current treatment    Chronic bilateral low back pain without sciatica  Etiology of increased back pain is unclear.  Will try a short course of prednisone as she is tolerated this well in the past.  X-rays will be ordered as well.  If x-rays are negative and she continues to have pain and numbness in her legs I think an MRI would be reasonable as well.  She will keep me updated regarding her progress.  - predniSONE (DELTASONE) 20 MG tablet  Dispense: 20 tablet; Refill: 0  - XR Lumbar Spine 2/3 Views    Hip pain, left  Obviously difficult to do an exam over the video visit today.  X-ray of her bilateral hips has been ordered.  Referral for orthopedics ordered as well.  - XR Pelvis and Hip Bilateral 2 Views  - Orthopedic  Referral        There are no discontinued medications.        Chief Complaint:  Follow Up          Subjective:   Britney Singh is a pleasant 28 year old female being evaluated via video visit today for the following concern/s:    Mood: Patient has a past medical history significant for recurrent major depressive disorder.  She is overall doing well.  She is seeing 2 different therapist which is going well.  She feels stable in that regard.    Back pain: Patient notes that she has chronic low back pain.  She states that this has worsened a bit over the last few months.  She is doing physical therapy for pelvic floor.  She notes that a few days ago she was sitting with her knees tucked to her chest.  She sat that way  for about 5 to 10 minutes and noted both of her legs went completely numb.  She states that they took 10 to 15 minutes to regain feeling.  This has not happened for her in the past.  The back pain does not necessarily radiate but she does have SI joint pain that radiates to her legs bilaterally.  She has not had incontinence of urine but does note that her urination sometimes feels incomplete with the emptying.    Hip pain: Patient notes bilateral hip pain worse on the left.  She states her physical therapist did some exercises and was worried about impingement.  She states with certain hip movements (although she is unable to currently define) she will have pain shooting down her leg or in her knee.      12 point review of systems completed and negative except for what has been described above    History   Smoking Status    Former    Packs/day: 0.00    Years: 0.00    Types: Cigarettes    Quit date: 6/7/2012   Smokeless Tobacco    Never         Current Outpatient Medications:     albuterol (PROAIR HFA/PROVENTIL HFA/VENTOLIN HFA) 108 (90 Base) MCG/ACT inhaler, Inhale 2 puffs into the lungs every 6 hours as needed for shortness of breath, Disp: 18 g, Rfl: 1    ALPRAZolam (XANAX) 0.5 MG tablet, Take 1 tablet (0.5 mg) by mouth 3 times daily as needed for anxiety, Disp: 10 tablet, Rfl: 0    cholecalciferol (VITAMIN D3) 1250 mcg (85215 units) capsule, Take 1,250 mcg by mouth every 7 days, Disp: , Rfl:     hydrOXYzine (ATARAX) 25 MG tablet, TAKE 1 TABLET BY MOUTH EVERY SIX HOURS AS NEEDED FOR FOR ITCHING AND TAKE 1 TABLET BY MOUTH THREE TIMESDAILY AS NEEDED FOR ANXIETY, Disp: 30 tablet, Rfl: 0    lamoTRIgine (LAMICTAL) 100 MG 24 hr tablet, Take 1 tablet by mouth daily at 2 pm, Disp: , Rfl:     naltrexone (DEPADE/REVIA) 50 MG tablet, , Disp: , Rfl:     ondansetron (ZOFRAN) 4 MG tablet, TAKE 1 TABLET BY MOUTH THREE TIMES DAILY AS NEEDED, Disp: 30 tablet, Rfl: 1    prazosin (MINIPRESS) 2 MG capsule, Take 5 capsules (10 mg) by  mouth at bedtime, Disp: 450 capsule, Rfl: 3    predniSONE (DELTASONE) 20 MG tablet, Take 1 tablet (20 mg) by mouth 2 times daily, Disp: 20 tablet, Rfl: 0    propranolol (INDERAL) 20 MG tablet, 1 tablet Orally Once a day as needed for anxiety, Disp: 90 tablet, Rfl: 3    rizatriptan (MAXALT-MLT) 5 MG ODT, Take 1 tablet (5 mg) by mouth at onset of headache for migraine May repeat in 2 hours. Max 6 tablets/24 hours., Disp: 10 tablet, Rfl: 0    SUMAtriptan (IMITREX) 50 MG tablet, Take 1 tablet (50 mg) by mouth at onset of headache for migraine, Disp: 10 tablet, Rfl: 4    tiZANidine (ZANAFLEX) 2 MG tablet, TAKE 1/2 TO 2 TABLETS BY MOUTH THREE TIMES DAILY, Disp: 90 tablet, Rfl: 1    traZODone (DESYREL) 50 MG tablet, Take 50 mg by mouth daily, Disp: , Rfl:     dexmethylphenidate (FOCALIN) 10 MG tablet, Take 10 mg by mouth (Patient not taking: Reported on 1/16/2024), Disp: , Rfl:         Objective:  No vitals were done due to the nature of this visit       No data to display                        General: No acute distress  Psych: Appropriate affect  HEENT: moist mucous membranes  Pulmonary: Breathing comfortably, speaking in complete sentences  Extremities: warm and well perfused with no edema  Skin: warm and dry with no rash         This note has been dictated and transcribed using voice recognition software.   Any errors in transcription are unintentional and inherent to the software.      Video-Visit Details    Type of service:  Video Visit   Video Start Time:  355pm  Video End Time:422pm    Originating Location (pt. Location): Home    Distant Location (provider location):  On-site  Platform used for Video Visit: [employer]    Answers submitted by the patient for this visit:  Patient Health Questionnaire (Submitted on 1/15/2024)  If you checked off any problems, how difficult have these problems made it for you to do your work, take care of things at home, or get along with other people?: Extremely difficult  PHQ9 TOTAL  SCORE: 20  Back Pain Visit Questionnaire (Submitted on 1/15/2024)  Your back pain is: recurring  Chronic or Recurring Back Pain Visit Questionnaire (Submitted on 1/15/2024)  Where is your back pain located? : other  How would you describe your back pain? : sharp, shooting  Where does your back pain spread? : right thigh, left thigh  Since you noticed your back pain, how has it changed? : unchanged  Does your back pain interfere with your job?: Not applicable  General Questionnaire (Submitted on 1/15/2024)  Chief Complaint: Chronic problems general questions HPI Form  How many servings of fruits and vegetables do you eat daily?: 4 or more  On average, how many sweetened beverages do you drink each day (Examples: soda, juice, sweet tea, etc.  Do NOT count diet or artificially sweetened beverages)?: 0  How many minutes a day do you exercise enough to make your heart beat faster?: 20 to 29  How many days a week do you exercise enough to make your heart beat faster?: 7  How many days per week do you miss taking your medication?: 0

## 2024-01-17 ENCOUNTER — THERAPY VISIT (OUTPATIENT)
Dept: PHYSICAL THERAPY | Facility: CLINIC | Age: 29
End: 2024-01-17
Attending: FAMILY MEDICINE
Payer: COMMERCIAL

## 2024-01-17 DIAGNOSIS — M62.89 PELVIC FLOOR DYSFUNCTION IN FEMALE: Primary | ICD-10-CM

## 2024-01-17 PROCEDURE — 97140 MANUAL THERAPY 1/> REGIONS: CPT | Mod: GP | Performed by: PHYSICAL THERAPIST

## 2024-01-17 PROCEDURE — 97110 THERAPEUTIC EXERCISES: CPT | Mod: GP | Performed by: PHYSICAL THERAPIST

## 2024-01-23 ENCOUNTER — ANCILLARY PROCEDURE (OUTPATIENT)
Dept: GENERAL RADIOLOGY | Facility: CLINIC | Age: 29
End: 2024-01-23
Attending: FAMILY MEDICINE
Payer: COMMERCIAL

## 2024-01-23 DIAGNOSIS — M25.552 HIP PAIN, LEFT: ICD-10-CM

## 2024-01-23 PROCEDURE — 72100 X-RAY EXAM L-S SPINE 2/3 VWS: CPT | Mod: TC | Performed by: RADIOLOGY

## 2024-01-23 PROCEDURE — 73522 X-RAY EXAM HIPS BI 3-4 VIEWS: CPT | Mod: TC | Performed by: RADIOLOGY

## 2024-01-31 ENCOUNTER — THERAPY VISIT (OUTPATIENT)
Dept: PHYSICAL THERAPY | Facility: CLINIC | Age: 29
End: 2024-01-31
Attending: FAMILY MEDICINE
Payer: COMMERCIAL

## 2024-01-31 DIAGNOSIS — M62.89 PELVIC FLOOR DYSFUNCTION IN FEMALE: Primary | ICD-10-CM

## 2024-01-31 PROCEDURE — 97140 MANUAL THERAPY 1/> REGIONS: CPT | Mod: GP | Performed by: PHYSICAL THERAPIST

## 2024-02-06 ASSESSMENT — ANXIETY QUESTIONNAIRES
IF YOU CHECKED OFF ANY PROBLEMS ON THIS QUESTIONNAIRE, HOW DIFFICULT HAVE THESE PROBLEMS MADE IT FOR YOU TO DO YOUR WORK, TAKE CARE OF THINGS AT HOME, OR GET ALONG WITH OTHER PEOPLE: EXTREMELY DIFFICULT
6. BECOMING EASILY ANNOYED OR IRRITABLE: MORE THAN HALF THE DAYS
3. WORRYING TOO MUCH ABOUT DIFFERENT THINGS: NEARLY EVERY DAY
GAD7 TOTAL SCORE: 15
2. NOT BEING ABLE TO STOP OR CONTROL WORRYING: MORE THAN HALF THE DAYS
7. FEELING AFRAID AS IF SOMETHING AWFUL MIGHT HAPPEN: NEARLY EVERY DAY
1. FEELING NERVOUS, ANXIOUS, OR ON EDGE: MORE THAN HALF THE DAYS
5. BEING SO RESTLESS THAT IT IS HARD TO SIT STILL: NOT AT ALL
8. IF YOU CHECKED OFF ANY PROBLEMS, HOW DIFFICULT HAVE THESE MADE IT FOR YOU TO DO YOUR WORK, TAKE CARE OF THINGS AT HOME, OR GET ALONG WITH OTHER PEOPLE?: EXTREMELY DIFFICULT
4. TROUBLE RELAXING: NEARLY EVERY DAY
GAD7 TOTAL SCORE: 15
7. FEELING AFRAID AS IF SOMETHING AWFUL MIGHT HAPPEN: NEARLY EVERY DAY

## 2024-02-13 ENCOUNTER — VIRTUAL VISIT (OUTPATIENT)
Dept: FAMILY MEDICINE | Facility: CLINIC | Age: 29
End: 2024-02-13
Payer: COMMERCIAL

## 2024-02-13 DIAGNOSIS — T75.3XXA MOTION SICKNESS, INITIAL ENCOUNTER: Primary | ICD-10-CM

## 2024-02-13 DIAGNOSIS — M79.662 PAIN IN BOTH LOWER LEGS: ICD-10-CM

## 2024-02-13 DIAGNOSIS — M79.661 PAIN IN BOTH LOWER LEGS: ICD-10-CM

## 2024-02-13 PROCEDURE — 99213 OFFICE O/P EST LOW 20 MIN: CPT | Mod: 95 | Performed by: FAMILY MEDICINE

## 2024-02-13 RX ORDER — GABAPENTIN 100 MG/1
100 CAPSULE ORAL 3 TIMES DAILY PRN
Qty: 30 CAPSULE | Refills: 0 | Status: SHIPPED | OUTPATIENT
Start: 2024-02-13 | End: 2024-06-25

## 2024-02-13 RX ORDER — SCOLOPAMINE TRANSDERMAL SYSTEM 1 MG/1
1 PATCH, EXTENDED RELEASE TRANSDERMAL
Qty: 4 PATCH | Refills: 0 | Status: SHIPPED | OUTPATIENT
Start: 2024-02-13

## 2024-02-13 NOTE — PROGRESS NOTES
Britney is a 28 year old who is being evaluated via a billable video visit.      How would you like to obtain your AVS? MyChart  If the video visit is dropped, the invitation should be resent by: Text to cell phone: [phone]  Will anyone else be joining your video visit? No      -------------------------    Assessment/Plan:    Britney Singh is a 28 year old female presenting for:    Motion sickness, initial encounter  Scopolamine patch given.  Discussed risk and benefits and most common side effects.  Would recommend placing this 4 to 6 hours prior to her flight.  She could continually wear this throughout her trip if she would like or take it off after she has done with her flight and put anyone back on prior to coming home.  - scopolamine (TRANSDERM) 1 MG/3DAYS 72 hr patch  Dispense: 4 patch; Refill: 0    Pain in both lower legs  Gabapentin sent to the pharmacy.  After doing a tendon release with the physical therapist she is feeling much better.  She is hopeful to get the gabapentin in case symptoms return.  - gabapentin (NEURONTIN) 100 MG capsule  Dispense: 30 capsule; Refill: 0        Medications Discontinued During This Encounter   Medication Reason    naltrexone (DEPADE/REVIA) 50 MG tablet Therapy completed (No AVS)    predniSONE (DELTASONE) 20 MG tablet Therapy completed (No AVS)           Chief Complaint:  Recheck Medication          Subjective:   Britney Singh is a pleasant 28 year old female being evaluated via video visit today for the following concern/s:    Motion sickness: Patient notes that last time she was on a plane she became very motion sick.  She is wondering about a medication that she could potentially take to help prevent this.  She is going to [place] to [facility] in a few weeks and does not want illness to ruin her trip.    After a surgery recently she had a scopolamine patch which she wore for 3 days and tolerated well.  She wonders if this would be an option.    Leg  numbness: Patient was dealing with low back pain as well as bilateral leg numbness and some weakness.  She has a appointment with the spine specialist next month.  Her physical therapist was able to do some sort of a tendon release which helped with her nerve pain.  She is wondering about a medication for nerve pain in case it were to come back.      12 point review of systems completed and negative except for what has been described above    History   Smoking Status    Former    Packs/day: 0.00    Years: 0.00    Types: Cigarettes    Quit date: 6/7/2012   Smokeless Tobacco    Never         Current Outpatient Medications:     albuterol (PROAIR HFA/PROVENTIL HFA/VENTOLIN HFA) 108 (90 Base) MCG/ACT inhaler, Inhale 2 puffs into the lungs every 6 hours as needed for shortness of breath, Disp: 18 g, Rfl: 1    ALPRAZolam (XANAX) 0.5 MG tablet, Take 1 tablet (0.5 mg) by mouth 3 times daily as needed for anxiety, Disp: 10 tablet, Rfl: 0    cholecalciferol (VITAMIN D3) 1250 mcg (52550 units) capsule, Take 1,250 mcg by mouth every 7 days, Disp: , Rfl:     dexmethylphenidate (FOCALIN) 10 MG tablet, Take 10 mg by mouth, Disp: , Rfl:     gabapentin (NEURONTIN) 100 MG capsule, Take 1 capsule (100 mg) by mouth 3 times daily as needed for neuropathic pain, Disp: 30 capsule, Rfl: 0    hydrOXYzine (ATARAX) 25 MG tablet, TAKE 1 TABLET BY MOUTH EVERY SIX HOURS AS NEEDED FOR FOR ITCHING AND TAKE 1 TABLET BY MOUTH THREE TIMESDAILY AS NEEDED FOR ANXIETY, Disp: 30 tablet, Rfl: 0    lamoTRIgine (LAMICTAL) 100 MG 24 hr tablet, Take 1 tablet by mouth daily at 2 pm, Disp: , Rfl:     ondansetron (ZOFRAN) 4 MG tablet, TAKE 1 TABLET BY MOUTH THREE TIMES DAILY AS NEEDED, Disp: 30 tablet, Rfl: 1    prazosin (MINIPRESS) 2 MG capsule, Take 5 capsules (10 mg) by mouth at bedtime, Disp: 450 capsule, Rfl: 3    propranolol (INDERAL) 20 MG tablet, 1 tablet Orally Once a day as needed for anxiety, Disp: 90 tablet, Rfl: 3    rizatriptan (MAXALT-MLT) 5 MG  ODT, Take 1 tablet (5 mg) by mouth at onset of headache for migraine May repeat in 2 hours. Max 6 tablets/24 hours., Disp: 10 tablet, Rfl: 0    scopolamine (TRANSDERM) 1 MG/3DAYS 72 hr patch, Place 1 patch onto the skin every 72 hours, Disp: 4 patch, Rfl: 0    SUMAtriptan (IMITREX) 50 MG tablet, Take 1 tablet (50 mg) by mouth at onset of headache for migraine, Disp: 10 tablet, Rfl: 4    tiZANidine (ZANAFLEX) 2 MG tablet, TAKE 1/2 TO 2 TABLETS BY MOUTH THREE TIMES DAILY, Disp: 90 tablet, Rfl: 1    traZODone (DESYREL) 50 MG tablet, Take 50 mg by mouth daily, Disp: , Rfl:         Objective:  No vitals were done due to the nature of this visit       No data to display                        General: No acute distress  Psych: Appropriate affect  HEENT: moist mucous membranes  Pulmonary: Breathing comfortably, speaking in complete sentences  Extremities: warm and well perfused with no edema  Skin: warm and dry with no rash       This note has been dictated and transcribed using voice recognition software.   Any errors in transcription are unintentional and inherent to the software.        Video-Visit Details    Type of service:  Video Visit   Video Start Time: 350  Video End Time:412    Originating Location (pt. Location): Home    Distant Location (provider location):  On-site  [facility]  Signed Electronically by: Anushka Arnold MD    Answers submitted by the patient for this visit:  CHEMO-7 (Submitted on 2/6/2024)  CHEMO 7 TOTAL SCORE: 15  Depression / Anxiety Questionnaire (Submitted on 2/6/2024)  Chief Complaint: Chronic problems general questions HPI Form  Depression/Anxiety: Depression & Anxiety  Depression & Anxiety (Submitted on 2/6/2024)  Chief Complaint: Chronic problems general questions HPI Form  Status since last visit:: medium  Anxiety since last: : worse  Other associated symptoms of depression:: No  Other associated symotome: : No  Significant life event: : No  Anxious::  Yes  Current substance use:: No  General Questionnaire (Submitted on 2/6/2024)  Chief Complaint: Chronic problems general questions HPI Form  What is the reason for your visit today? : Med check  How many servings of fruits and vegetables do you eat daily?: 4 or more  On average, how many sweetened beverages do you drink each day (Examples: soda, juice, sweet tea, etc.  Do NOT count diet or artificially sweetened beverages)?: 0  How many minutes a day do you exercise enough to make your heart beat faster?: 20 to 29  How many days a week do you exercise enough to make your heart beat faster?: 7  How many days per week do you miss taking your medication?: 0

## 2024-03-05 ENCOUNTER — THERAPY VISIT (OUTPATIENT)
Dept: PHYSICAL THERAPY | Facility: CLINIC | Age: 29
End: 2024-03-05
Attending: FAMILY MEDICINE
Payer: COMMERCIAL

## 2024-03-05 DIAGNOSIS — M62.89 PELVIC FLOOR DYSFUNCTION IN FEMALE: Primary | ICD-10-CM

## 2024-03-05 PROCEDURE — 97110 THERAPEUTIC EXERCISES: CPT | Mod: GP | Performed by: PHYSICAL THERAPIST

## 2024-03-05 PROCEDURE — 97140 MANUAL THERAPY 1/> REGIONS: CPT | Mod: GP | Performed by: PHYSICAL THERAPIST

## 2024-03-05 NOTE — PROGRESS NOTES
03/05/24 0500   Appointment Info   Signing clinician's name / credentials Kala Garcia, PT, DPT   Visits Used 11   Medical Diagnosis Pelvic floor dysfunction in female   PT Tx Diagnosis PFM tightness and pain   Quick Adds Certification;Pelvic Consent   Progress Note/Certification   Start of Care Date 09/28/23   Onset of illness/injury or Date of Surgery 07/01/23   Therapy Frequency 1 time to set up final HEP   Predicted Duration 1 visit   Certification date from 02/07/24   Certification date to 03/05/24   GOALS   PT Goals 2;3;4   PT Goal 1   Goal Identifier urination - relxation   Goal Description Pt will be able to urinate w/o straining 75% of the time.   Goal Progress straining due to the stress of trip- straining 2x/day   Target Date 11/09/23   PT Goal 2   Goal Identifier pelvic pain   Goal Description Pt will relate less than 1/10 pelvic pain w intercourse   Goal Progress improved since starting PT however continues to have 7/10 pain on average   Target Date 11/09/23   PT Goal 3   Goal Identifier strength   Goal Description Pt will demonstrate 4/5 hip strength to improve stability and reduce LBP   Goal Progress goal met -   Target Date 12/21/23   PT Goal 4   Goal Identifier HEP   Goal Description Pt will be ind in HEP to prevent return of symptoms   Goal Progress not good about it on vacation, but goes well at home   Target Date 12/21/23   Subjective Report   Subjective Report Pt hasnt been able to see the spine specialist, will see in a few weeks. Pt relates R hip flexor was sore for a while but doing better. Sore after incline hiking. Pt relates having some lower abdominal pain after increased sitting. pt does better with walking.   Objective Measures   Objective Measures Objective Measure 1;Objective Measure 3;Objective Measure 2;Objective Measure 4;Objective Measure 5;Objective Measure 6   Objective Measure 1   Objective Measure Lumbar ROM   Details flex:hands flat on floor,  ext: 20 deg w stretching on  front   Objective Measure 2   Objective Measure special test   Details ROM WNL - SLR: 100 deg   Objective Measure 3   Objective Measure Palpation   Details mild TTP R illiopsoass   Objective Measure 4   Objective Measure MMT   Details hip flex: 5/5  hip abd: 4/5 B  hip ER: 4/5 B Hip IR:R 4/5.  4/5   Objective Measure 5   Objective Measure AUA   Details 11   Objective Measure 6   Objective Measure DL lowering   Details 45 deg with out pain   Treatment Interventions (PT)   Interventions Therapeutic Procedure/Exercise;Therapeutic Activity;Manual Therapy   Therapeutic Procedure/Exercise   Therapeutic Procedures: strength, endurance, ROM, flexibillity minutes (12659) 18   Therapeutic Procedures Ther Proc 2;Ther Proc 3;Ther Proc 5;Ther Proc 4;Ther Proc 6;Ther Proc 7;Ther Proc 8;Ther Proc 9;Ther Proc 10   Ther Proc 1 stretching - 30 secs x 2   Ther Proc 1 - Details butterfly, hamstring, hip flexor, lateral lunge, pretzel   Skilled Intervention improve mobility and strength   Patient Response/Progress demonstrates understanding   PTRx Ther Proc 1 birddog   PTRx Ther Proc 1 - Details x 10   PTRx Ther Proc 2 Side Plank Modified Knees   PTRx Ther Proc 2 - Details x 3   PTRx Ther Proc 3 Prone Plank   PTRx Ther Proc 3 - Details x 3   PTRx Ther Proc 4 Pilates - One [place] Level 3   PTRx Ther Proc 4 - Details x 10   PTRx Ther Proc 5 Pilates - One [place] Level 1   PTRx Ther Proc 5 - Details x 10   PTRx Ther Proc 6 Side Stepping With Theraband   PTRx Ther Proc 6 - Details x 20   PTRx Ther Proc 7 Fire Hydrant   PTRx Ther Proc 7 - Details x 20   PTRx Ther Proc 8 Donkey Kick #2 Standing   PTRx Ther Proc 8 - Details x 20   PTRx Ther Proc 9 Gluteal Myofascial Full Arc   PTRx Ther Proc 9 - Details x 20   PTRx Ther Proc 10 Gluteal Myofascial Upper Arc   PTRx Ther Proc 10 - Details x 20   PTRx Ther Proc 11 Gluteal Myofascial Lower Arc   PTRx Ther Proc 11 - Details x 20   PTRx Ther Proc 12 Gluteal Myofascial Flexion Extension Full Arc  Combo   PTRx Ther Proc 12 - Details x 20   PTRx Ther Proc 13 Standing Fire Hydrant   PTRx Ther Proc 13 - Details x 20   PTRx Ther Proc 14 Gluteal Myofascial Self Release   PTRx Ther Proc 14 - Details 30 secs x 2   PTRx Ther Proc 15 All 4s Cat Cow   PTRx Ther Proc 15 - Details x 10   PTRx Ther Proc 16  Hip Flexor Stretch Boogie Test Position   PTRx Ther Proc 16 - Details 30 secs x 2   PTRx Ther Proc 17 Standing IT Band Stretch Using Wall   PTRx Ther Proc 17 - Details 30 secs x 2   PTRx Ther Proc 18 Pelvic Floor Muscle Strengthening Elevator    PTRx Ther Proc 18 - Details 30 secs x 2   PTRx Ther Proc 19 Hip Abduction With Theraband   PTRx Ther Proc 19 - Details 30 secs x 2   PTRx Ther Proc 20 Gluteal Myofascial Piriformis Cruncher   PTRx Ther Proc 20 - Details 30 secs x 2   Therapeutic Activity   Therapeutic Activities Ther Act 2;Ther Act 3;Ther Act 4;Ther Act 5   Manual Therapy   Manual Therapy: Mobilization, MFR, MLD, friction massage minutes (55048) 30   Manual Therapy Manual Therapy 2;Manual Therapy 4;Manual Therapy 3;Manual Therapy 5;Manual Therapy 6;Manual Therapy 7;Manual Therapy 8;Manual Therapy 9   Manual Therapy 1 illiopsoas release B   Manual Therapy 2 MET for R posterior innominate rotation, pubic shotgun   Manual Therapy 3 long axis traction on R   Manual Therapy 4 MFR to lower abdomen   Manual Therapy 5 STM/MFR lumbar spine   Skilled Intervention improve mobility, reduce pain   Patient Response/Progress feels better after   Eval/Assessments   Assessments   (Pt returns and is set up on final HEP as pt is independent. Overall pt is managing pain and improving in strength but will need to continue at home. Pain is related to stress as times.)   Education   Learner/Method Patient   Plan   Home program [email or web address]   Plan for next session check hips, MFR, illiopsoas, check abdominals, lunge,s, seated on ball, preying mantis, biofeedback w sticks   Comments   Comments 1x/week -   Pelvic Health Informed Consent  Statement Discussed with patient/guardian reason for referral regarding pelvic health needs and external/internal pelvic floor muscle examination.  Opportunity provided to ask questions and verbal consent for assessment and intervention was given.   Total Session Time   Timed Code Treatment Minutes 48   Total Treatment Time (sum of timed and untimed services) 48         [facility]                                                                                   OUTPATIENT PHYSICAL THERAPY    PLAN OF TREATMENT FOR OUTPATIENT REHABILITATION   Patient's Last Name, First Name, Britney Abdullahi YOB: 1995   Provider's Name   [facility]   Medical Record No.  [id number]     Onset Date: 07/01/23  Start of Care Date: 09/28/23     Medical Diagnosis:  Pelvic floor dysfunction in female      PT Treatment Diagnosis:  PFM tightness and pain Plan of Treatment  Frequency/Duration: 1 time to set up final HEP/ 1 visit    Certification date from 02/07/24 to 03/05/24         See note for plan of treatment details and functional goals     Kala Garcia, PT                         I CERTIFY THE NEED FOR THESE SERVICES FURNISHED UNDER        THIS PLAN OF TREATMENT AND WHILE UNDER MY CARE .             Physician Signature               Date    X_____________________________________________________                  Referring Provider:  Anushka Arnold    Initial Assessment  See Epic Evaluation- Start of Care Date: 09/28/23            DISCHARGE  Reason for Discharge: No further expectation of progress.  Able to continue to progress with HEP    Equipment Issued: NA    Discharge Plan: Patient to continue home program.    Referring Provider:  Anushka Arnold

## 2024-03-21 ENCOUNTER — APPOINTMENT (OUTPATIENT)
Dept: OCCUPATIONAL MEDICINE | Facility: CLINIC | Age: 29
End: 2024-03-21

## 2024-03-21 PROCEDURE — 99000 SPECIMEN HANDLING OFFICE-LAB: CPT | Performed by: NURSE PRACTITIONER

## 2024-05-18 ENCOUNTER — MYC REFILL (OUTPATIENT)
Dept: FAMILY MEDICINE | Facility: CLINIC | Age: 29
End: 2024-05-18
Payer: COMMERCIAL

## 2024-05-18 DIAGNOSIS — U07.1 INFECTION DUE TO 2019 NOVEL CORONAVIRUS: ICD-10-CM

## 2024-05-20 RX ORDER — ALBUTEROL SULFATE 90 UG/1
2 AEROSOL, METERED RESPIRATORY (INHALATION) EVERY 6 HOURS PRN
Qty: 18 G | Refills: 1 | Status: SHIPPED | OUTPATIENT
Start: 2024-05-20

## 2024-06-19 ASSESSMENT — ASTHMA QUESTIONNAIRES
QUESTION_5 LAST FOUR WEEKS HOW WOULD YOU RATE YOUR ASTHMA CONTROL: COMPLETELY CONTROLLED
ACT_TOTALSCORE: 24
ACT_TOTALSCORE: 24
QUESTION_3 LAST FOUR WEEKS HOW OFTEN DID YOUR ASTHMA SYMPTOMS (WHEEZING, COUGHING, SHORTNESS OF BREATH, CHEST TIGHTNESS OR PAIN) WAKE YOU UP AT NIGHT OR EARLIER THAN USUAL IN THE MORNING: NOT AT ALL
QUESTION_1 LAST FOUR WEEKS HOW MUCH OF THE TIME DID YOUR ASTHMA KEEP YOU FROM GETTING AS MUCH DONE AT WORK, SCHOOL OR AT HOME: NONE OF THE TIME
QUESTION_4 LAST FOUR WEEKS HOW OFTEN HAVE YOU USED YOUR RESCUE INHALER OR NEBULIZER MEDICATION (SUCH AS ALBUTEROL): ONCE A WEEK OR LESS
QUESTION_2 LAST FOUR WEEKS HOW OFTEN HAVE YOU HAD SHORTNESS OF BREATH: NOT AT ALL

## 2024-06-25 ENCOUNTER — VIRTUAL VISIT (OUTPATIENT)
Dept: FAMILY MEDICINE | Facility: CLINIC | Age: 29
End: 2024-06-25
Payer: COMMERCIAL

## 2024-06-25 DIAGNOSIS — F98.8 ATTENTION DEFICIT DISORDER (ADD) WITHOUT HYPERACTIVITY: Primary | ICD-10-CM

## 2024-06-25 DIAGNOSIS — F41.9 ANXIETY: ICD-10-CM

## 2024-06-25 PROCEDURE — 99214 OFFICE O/P EST MOD 30 MIN: CPT | Mod: 95 | Performed by: FAMILY MEDICINE

## 2024-06-25 PROCEDURE — G2211 COMPLEX E/M VISIT ADD ON: HCPCS | Mod: 95 | Performed by: FAMILY MEDICINE

## 2024-06-25 RX ORDER — DEXTROAMPHETAMINE SACCHARATE, AMPHETAMINE ASPARTATE MONOHYDRATE, DEXTROAMPHETAMINE SULFATE AND AMPHETAMINE SULFATE 3.75; 3.75; 3.75; 3.75 MG/1; MG/1; MG/1; MG/1
15 CAPSULE, EXTENDED RELEASE ORAL DAILY
Qty: 30 CAPSULE | Refills: 0 | Status: SHIPPED | OUTPATIENT
Start: 2024-06-25 | End: 2024-07-26

## 2024-06-25 RX ORDER — LAMOTRIGINE 50 MG/1
1 TABLET, EXTENDED RELEASE ORAL
COMMUNITY
Start: 2023-10-24 | End: 2024-06-25

## 2024-06-25 RX ORDER — LAMOTRIGINE 25 MG/1
1 TABLET ORAL
COMMUNITY
Start: 2023-07-26 | End: 2024-06-25

## 2024-06-25 RX ORDER — CLONIDINE HYDROCHLORIDE 0.1 MG/1
TABLET, EXTENDED RELEASE ORAL
COMMUNITY
Start: 2024-06-14

## 2024-06-25 RX ORDER — METHYLPHENIDATE HYDROCHLORIDE 27 MG/1
TABLET, EXTENDED RELEASE ORAL
COMMUNITY
Start: 2024-06-17 | End: 2024-06-25

## 2024-06-25 NOTE — PROGRESS NOTES
Britney is a 28 year old who is being evaluated via a billable video visit.    How would you like to obtain your AVS? Quintonharsilvana  If the video visit is dropped, the invitation should be resent by: Text to cell phone: [phone]  Will anyone else be joining your video visit? No      -------------------------    Assessment/Plan:    Britney Singh is a 28 year old female presenting for:    Attention deficit disorder (ADD) without hyperactivity  Patient has tried Adderall in the past and we went over her previous attempt at this medication.  She feels as though she would like to trial again and this was sent to the pharmacy.  Discussed risk and benefits.  She will let me know if she would like to go back on the Concerta.  Additionally, she will let me know if she would like an immediate release Adderall to take in the afternoon.  - amphetamine-dextroamphetamine (ADDERALL XR) 15 MG 24 hr capsule  Dispense: 30 capsule; Refill: 0    Anxiety  Will continue her propranolol and hydroxyzine as needed.  She will let me know if there is anything else I can do.    The longitudinal plan of care for the diagnosis(es)/condition(s) as documented were addressed during this visit. Due to the added complexity in care, I will continue to support Britney in the subsequent management and with ongoing continuity of care.    Medications Discontinued During This Encounter   Medication Reason    ALPRAZolam (XANAX) 0.5 MG tablet Therapy completed (No AVS)    lamoTRIgine (LAMICTAL) 100 MG 24 hr tablet Therapy completed (No AVS)    traZODone (DESYREL) 50 MG tablet Therapy completed (No AVS)    prazosin (MINIPRESS) 2 MG capsule Therapy completed (No AVS)    gabapentin (NEURONTIN) 100 MG capsule Therapy completed (No AVS)    dexmethylphenidate (FOCALIN) 10 MG tablet Therapy completed (No AVS)    lamoTRIgine (LAMICTAL) 25 MG tablet     lamoTRIgine (LAMICTAL) 50 MG 24 hr tablet Therapy completed (No AVS)    CONCERTA 27 MG CR tablet            Chief  Complaint:  Recheck Medication          Subjective:   Britney Singh is a pleasant 28 year old female being evaluated via video visit today for the following concern/s:    Mood: Patient has a past medical history significant for depression, anxiety and ADHD as well as he has PTSD.  She is currently seeing a psychiatrist who was prescribing Concerta, twice daily clonidine, as needed propranolol and as needed hydroxyzine.  She was previously on Lamictal but she has stopped taking that medication for the last few months.    She is hopeful that I would be able to take over her medication refills for her as she feels as though she is doing well.  She does mention that she would like to try Adderall instead of Concerta but does not feel that her clonidine needs to be adjusted.    Otherwise, she has no specific questions or concerns.      12 point review of systems completed and negative except for what has been described above    History   Smoking Status    Former    Packs/day: 0.00    Years: 0.00    Types: Cigarettes    Quit date: 6/7/2012   Smokeless Tobacco    Never         Current Outpatient Medications:     albuterol (PROAIR HFA/PROVENTIL HFA/VENTOLIN HFA) 108 (90 Base) MCG/ACT inhaler, Inhale 2 puffs into the lungs every 6 hours as needed for shortness of breath, Disp: 18 g, Rfl: 1    amphetamine-dextroamphetamine (ADDERALL XR) 15 MG 24 hr capsule, Take 1 capsule (15 mg) by mouth daily, Disp: 30 capsule, Rfl: 0    cholecalciferol (VITAMIN D3) 1250 mcg (75875 units) capsule, Take 1,250 mcg by mouth every 7 days, Disp: , Rfl:     CloNIDine ER (KAPVAY) 0.1 MG 12 hr tablet, take 1 tablet by mouth twice a day as directed, Disp: , Rfl:     hydrOXYzine (ATARAX) 25 MG tablet, TAKE 1 TABLET BY MOUTH EVERY SIX HOURS AS NEEDED FOR FOR ITCHING AND TAKE 1 TABLET BY MOUTH THREE TIMESDAILY AS NEEDED FOR ANXIETY, Disp: 30 tablet, Rfl: 0    ondansetron (ZOFRAN) 4 MG tablet, TAKE 1 TABLET BY MOUTH THREE TIMES DAILY AS NEEDED, Disp:  30 tablet, Rfl: 1    propranolol (INDERAL) 20 MG tablet, 1 tablet Orally Once a day as needed for anxiety, Disp: 90 tablet, Rfl: 3    rizatriptan (MAXALT-MLT) 5 MG ODT, Take 1 tablet (5 mg) by mouth at onset of headache for migraine May repeat in 2 hours. Max 6 tablets/24 hours., Disp: 10 tablet, Rfl: 0    scopolamine (TRANSDERM) 1 MG/3DAYS 72 hr patch, Place 1 patch onto the skin every 72 hours, Disp: 4 patch, Rfl: 0    SUMAtriptan (IMITREX) 50 MG tablet, Take 1 tablet (50 mg) by mouth at onset of headache for migraine, Disp: 10 tablet, Rfl: 4    tiZANidine (ZANAFLEX) 2 MG tablet, TAKE 1/2 TO 2 TABLETS BY MOUTH THREE TIMES DAILY, Disp: 90 tablet, Rfl: 1        Objective:  No vitals were done due to the nature of this visit       No data to display                        General: No acute distress  Psych: Appropriate affect  HEENT: moist mucous membranes  Pulmonary: Breathing comfortably, speaking in complete sentences  Extremities: warm and well perfused with no edema  Skin: warm and dry with no rash       This note has been dictated and transcribed using voice recognition software.   Any errors in transcription are unintentional and inherent to the software.        Video-Visit Details    Type of service:  Video Visit   Video End Time:1250/122  Originating Location (pt. Location): Home    Distant Location (provider location):  On-site  [facility]  Signed Electronically by: Anushka Arnold MD    Answers submitted by the patient for this visit:  General Questionnaire (Submitted on 6/19/2024)  Chief Complaint: Chronic problems general questions HPI Form  What is the reason for your visit today? : Med check  How many servings of fruits and vegetables do you eat daily?: 4 or more  On average, how many sweetened beverages do you drink each day (Examples: soda, juice, sweet tea, etc.  Do NOT count diet or artificially sweetened beverages)?: 0  How many minutes a day do you exercise  enough to make your heart beat faster?: 30 to 60  How many days a week do you exercise enough to make your heart beat faster?: 5  How many days per week do you miss taking your medication?: 0

## 2024-07-26 ENCOUNTER — TELEPHONE (OUTPATIENT)
Dept: FAMILY MEDICINE | Facility: CLINIC | Age: 29
End: 2024-07-26
Payer: COMMERCIAL

## 2024-07-26 NOTE — TELEPHONE ENCOUNTER
Their policy is 2 years in clinic visit for ADD medication. Lidia calling back to clarify. Not 6 months.     .Eileen Ireland PSC

## 2024-07-26 NOTE — TELEPHONE ENCOUNTER
Can you clarify if they are filling the med or do I need to send it somewhere else?    Thanks    EB

## 2024-07-26 NOTE — TELEPHONE ENCOUNTER
Call placed to [facility]  Spoke with Lidia Pharmacist    Lidia states that medication was refilled   Apologized for the misinformation   No further questions/concerns    Maximino Clemente, Clinic RN  [facility]

## 2024-07-26 NOTE — TELEPHONE ENCOUNTER
Lidia francois called to ask questions about patients medication.   She stated that patient has been receiving medication for ADD from multiple providers.  She received Adderall from Dr. Arnold on June 25th, Concerta from Sissy Wilson NP on June 17th, and Concerta from Lizet Olmstead on May 17th.   She is asking if provider is aware.     RN reviewed last visit and let her know that patient has been honest and transparent with provider. The reason adderall was because patient wanted to switch from concerta to adderall. And that patient has told Dr. Arnold about her psychiatrist.     She then asked when her last in person visit was, RN told her it was in November of 2023. Apparently their policy is that patient must be seen in person every 6 months in order for them to fill it.   Virtuals are not enough. So since patient has not had an in person visit she will not fill it.       Pharmacy will reach out to patient to notify her they will not fill.   Britney Bhat RN on 7/26/2024 at 10:38 AM

## 2024-08-27 ENCOUNTER — MYC REFILL (OUTPATIENT)
Dept: FAMILY MEDICINE | Facility: CLINIC | Age: 29
End: 2024-08-27
Payer: COMMERCIAL

## 2024-08-27 DIAGNOSIS — F98.8 ATTENTION DEFICIT DISORDER (ADD) WITHOUT HYPERACTIVITY: ICD-10-CM

## 2024-08-27 RX ORDER — DEXTROAMPHETAMINE SACCHARATE, AMPHETAMINE ASPARTATE MONOHYDRATE, DEXTROAMPHETAMINE SULFATE AND AMPHETAMINE SULFATE 3.75; 3.75; 3.75; 3.75 MG/1; MG/1; MG/1; MG/1
15 CAPSULE, EXTENDED RELEASE ORAL DAILY
Qty: 30 CAPSULE | Refills: 0 | Status: SHIPPED | OUTPATIENT
Start: 2024-08-27

## 2024-10-01 ENCOUNTER — MYC MEDICAL ADVICE (OUTPATIENT)
Dept: FAMILY MEDICINE | Facility: CLINIC | Age: 29
End: 2024-10-01
Payer: COMMERCIAL

## 2024-10-09 ENCOUNTER — OFFICE VISIT (OUTPATIENT)
Dept: FAMILY MEDICINE | Facility: CLINIC | Age: 29
End: 2024-10-09
Payer: COMMERCIAL

## 2024-10-09 VITALS
HEIGHT: 70 IN | DIASTOLIC BLOOD PRESSURE: 70 MMHG | BODY MASS INDEX: 26.66 KG/M2 | OXYGEN SATURATION: 100 % | WEIGHT: 186.25 LBS | HEART RATE: 85 BPM | SYSTOLIC BLOOD PRESSURE: 122 MMHG | RESPIRATION RATE: 16 BRPM | TEMPERATURE: 96.9 F

## 2024-10-09 DIAGNOSIS — F43.10 POSTTRAUMATIC STRESS DISORDER: ICD-10-CM

## 2024-10-09 DIAGNOSIS — F98.8 ATTENTION DEFICIT DISORDER (ADD) WITHOUT HYPERACTIVITY: ICD-10-CM

## 2024-10-09 DIAGNOSIS — F33.1 MODERATE EPISODE OF RECURRENT MAJOR DEPRESSIVE DISORDER (H): Primary | ICD-10-CM

## 2024-10-09 DIAGNOSIS — M25.552 HIP PAIN, LEFT: ICD-10-CM

## 2024-10-09 PROBLEM — F32.9 MAJOR DEPRESSION: Status: RESOLVED | Noted: 2021-02-03 | Resolved: 2024-10-09

## 2024-10-09 PROCEDURE — 91320 SARSCV2 VAC 30MCG TRS-SUC IM: CPT | Performed by: FAMILY MEDICINE

## 2024-10-09 PROCEDURE — 90480 ADMN SARSCOV2 VAC 1/ONLY CMP: CPT | Performed by: FAMILY MEDICINE

## 2024-10-09 PROCEDURE — 90471 IMMUNIZATION ADMIN: CPT | Performed by: FAMILY MEDICINE

## 2024-10-09 PROCEDURE — 99213 OFFICE O/P EST LOW 20 MIN: CPT | Mod: 25 | Performed by: FAMILY MEDICINE

## 2024-10-09 PROCEDURE — 90656 IIV3 VACC NO PRSV 0.5 ML IM: CPT | Performed by: FAMILY MEDICINE

## 2024-10-09 RX ORDER — DEXTROAMPHETAMINE/AMPHETAMINE 15 MG
15 CAPSULE, EXT RELEASE 24 HR ORAL DAILY
Qty: 30 CAPSULE | Refills: 0 | Status: SHIPPED | OUTPATIENT
Start: 2024-10-09 | End: 2024-11-08

## 2024-10-09 RX ORDER — DEXTROAMPHETAMINE SACCHARATE, AMPHETAMINE ASPARTATE, DEXTROAMPHETAMINE SULFATE AND AMPHETAMINE SULFATE 1.25; 1.25; 1.25; 1.25 MG/1; MG/1; MG/1; MG/1
5 TABLET ORAL DAILY
Qty: 30 TABLET | Refills: 0 | Status: SHIPPED | OUTPATIENT
Start: 2024-11-08 | End: 2024-12-08

## 2024-10-09 RX ORDER — DEXTROAMPHETAMINE SACCHARATE, AMPHETAMINE ASPARTATE, DEXTROAMPHETAMINE SULFATE AND AMPHETAMINE SULFATE 1.25; 1.25; 1.25; 1.25 MG/1; MG/1; MG/1; MG/1
5 TABLET ORAL DAILY
Qty: 30 TABLET | Refills: 0 | Status: SHIPPED | OUTPATIENT
Start: 2024-10-09 | End: 2024-11-08

## 2024-10-09 RX ORDER — DEXTROAMPHETAMINE/AMPHETAMINE 15 MG
15 CAPSULE, EXT RELEASE 24 HR ORAL DAILY
Qty: 30 CAPSULE | Refills: 0 | Status: SHIPPED | OUTPATIENT
Start: 2024-11-08 | End: 2024-12-08

## 2024-10-09 RX ORDER — DEXTROAMPHETAMINE SACCHARATE, AMPHETAMINE ASPARTATE, DEXTROAMPHETAMINE SULFATE AND AMPHETAMINE SULFATE 1.25; 1.25; 1.25; 1.25 MG/1; MG/1; MG/1; MG/1
5 TABLET ORAL DAILY
Qty: 30 TABLET | Refills: 0 | Status: SHIPPED | OUTPATIENT
Start: 2024-12-08 | End: 2025-01-07

## 2024-10-09 RX ORDER — DEXTROAMPHETAMINE/AMPHETAMINE 15 MG
15 CAPSULE, EXT RELEASE 24 HR ORAL DAILY
Qty: 30 CAPSULE | Refills: 0 | Status: SHIPPED | OUTPATIENT
Start: 2024-12-08 | End: 2025-01-07

## 2024-10-09 RX ORDER — DEXTROAMPHETAMINE SACCHARATE, AMPHETAMINE ASPARTATE MONOHYDRATE, DEXTROAMPHETAMINE SULFATE AND AMPHETAMINE SULFATE 3.75; 3.75; 3.75; 3.75 MG/1; MG/1; MG/1; MG/1
15 CAPSULE, EXTENDED RELEASE ORAL DAILY
Qty: 30 CAPSULE | Refills: 0 | Status: CANCELLED | OUTPATIENT
Start: 2024-10-09

## 2024-10-09 RX ORDER — CLONIDINE HYDROCHLORIDE 0.1 MG/1
0.1 TABLET, EXTENDED RELEASE ORAL DAILY
Qty: 90 TABLET | Refills: 3 | Status: SHIPPED | OUTPATIENT
Start: 2024-10-09

## 2024-10-09 ASSESSMENT — PATIENT HEALTH QUESTIONNAIRE - PHQ9
SUM OF ALL RESPONSES TO PHQ QUESTIONS 1-9: 8
SUM OF ALL RESPONSES TO PHQ QUESTIONS 1-9: 8
10. IF YOU CHECKED OFF ANY PROBLEMS, HOW DIFFICULT HAVE THESE PROBLEMS MADE IT FOR YOU TO DO YOUR WORK, TAKE CARE OF THINGS AT HOME, OR GET ALONG WITH OTHER PEOPLE: SOMEWHAT DIFFICULT

## 2024-10-09 NOTE — PROGRESS NOTES
Assessment/Plan:    Britney Singh is a 28 year old female presenting for:    Attention deficit disorder (ADD) without hyperactivity  Refills sent  - CloNIDine ER (KAPVAY) 0.1 MG 12 hr tablet  Dispense: 90 tablet; Refill: 3  - ADDERALL XR 15 MG 24 hr capsule  Dispense: 30 capsule; Refill: 0  - ADDERALL XR 15 MG 24 hr capsule  Dispense: 30 capsule; Refill: 0  - ADDERALL XR 15 MG 24 hr capsule  Dispense: 30 capsule; Refill: 0  - amphetamine-dextroamphetamine (ADDERALL) 5 MG tablet  Dispense: 30 tablet; Refill: 0  - amphetamine-dextroamphetamine (ADDERALL) 5 MG tablet  Dispense: 30 tablet; Refill: 0  - amphetamine-dextroamphetamine (ADDERALL) 5 MG tablet  Dispense: 30 tablet; Refill: 0    Moderate episode of recurrent major depressive disorder (H)  Patient is overall doing well.  She has had significant improvement since starting her Adderall.    Posttraumatic stress disorder  Stable - working with therapy    Hip pain, left  Discussed physical therapy.  Patient will do some exercises on her own and let me know if she would like a formal referral.      Medications Discontinued During This Encounter   Medication Reason    methylphenidate (RITALIN) 5 MG tablet Therapy completed (No AVS)    HYDROcodone-acetaminophen (NORCO) 5-325 MG tablet Therapy completed (No AVS)    propranolol (INDERAL) 20 MG tablet Therapy completed (No AVS)    CloNIDine ER (KAPVAY) 0.1 MG 12 hr tablet Reorder (No AVS)       The longitudinal plan of care for the diagnosis(es)/condition(s) as documented were addressed during this visit. Due to the added complexity in care, I will continue to support Britney in the subsequent management and with ongoing continuity of care.    Chief Complaint:  Recheck Medication        Subjective:   Britney Singh is a pleasant 28-year-old female who presents to the clinic today for a medication check.  She has a history of ADHD.  She currently takes Adderall 15 mg extended release in the morning and 5 mg immediate  release in the afternoons.  She feels close she is doing fairly well with the medication.  She is hopeful to get the name brand of the medication of extended release because the immediate release makes her feel ill.    Additionally, she has clonidine which she uses for ADHD occasionally as well.    Otherwise, she is overall doing well.  She has a history of depression and PTSD and she is currently seeing therapy for this.    She has been experiencing left-sided hip pain for the last few days.  She has had trochanteric bursitis in the past but she states that this is a bit lower.  She cannot think of any specific injury.    12 point review of systems completed and negative except for what has been described above    History   Smoking Status    Former    Packs/day: 0.00    Years: 0.00    Types: Cigarettes    Quit date: 6/7/2012   Smokeless Tobacco    Never         Current Outpatient Medications:     ADDERALL XR 15 MG 24 hr capsule, Take 1 capsule (15 mg) by mouth daily., Disp: 30 capsule, Rfl: 0    [START ON 11/8/2024] ADDERALL XR 15 MG 24 hr capsule, Take 1 capsule (15 mg) by mouth daily., Disp: 30 capsule, Rfl: 0    [START ON 12/8/2024] ADDERALL XR 15 MG 24 hr capsule, Take 1 capsule (15 mg) by mouth daily., Disp: 30 capsule, Rfl: 0    albuterol (PROAIR HFA/PROVENTIL HFA/VENTOLIN HFA) 108 (90 Base) MCG/ACT inhaler, Inhale 2 puffs into the lungs every 6 hours as needed for shortness of breath, Disp: 18 g, Rfl: 1    amphetamine-dextroamphetamine (ADDERALL XR) 15 MG 24 hr capsule, Take 1 capsule (15 mg) by mouth daily., Disp: 30 capsule, Rfl: 0    amphetamine-dextroamphetamine (ADDERALL) 5 MG tablet, Take 1 tablet (5 mg) by mouth daily., Disp: 30 tablet, Rfl: 0    [START ON 11/8/2024] amphetamine-dextroamphetamine (ADDERALL) 5 MG tablet, Take 1 tablet (5 mg) by mouth daily., Disp: 30 tablet, Rfl: 0    [START ON 12/8/2024] amphetamine-dextroamphetamine (ADDERALL) 5 MG tablet, Take 1 tablet (5 mg) by mouth daily., Disp:  "30 tablet, Rfl: 0    cholecalciferol (VITAMIN D3) 1250 mcg (10415 units) capsule, Take 1,250 mcg by mouth every 7 days, Disp: , Rfl:     CloNIDine ER (KAPVAY) 0.1 MG 12 hr tablet, Take 1 tablet (0.1 mg) by mouth daily., Disp: 90 tablet, Rfl: 3    hydrOXYzine (ATARAX) 25 MG tablet, TAKE 1 TABLET BY MOUTH EVERY SIX HOURS AS NEEDED FOR FOR ITCHING AND TAKE 1 TABLET BY MOUTH THREE TIMESDAILY AS NEEDED FOR ANXIETY, Disp: 30 tablet, Rfl: 0    ondansetron (ZOFRAN) 4 MG tablet, TAKE 1 TABLET BY MOUTH THREE TIMES DAILY AS NEEDED, Disp: 30 tablet, Rfl: 1    rizatriptan (MAXALT-MLT) 5 MG ODT, Take 1 tablet (5 mg) by mouth at onset of headache for migraine May repeat in 2 hours. Max 6 tablets/24 hours., Disp: 10 tablet, Rfl: 0    scopolamine (TRANSDERM) 1 MG/3DAYS 72 hr patch, Place 1 patch onto the skin every 72 hours, Disp: 4 patch, Rfl: 0    SUMAtriptan (IMITREX) 50 MG tablet, Take 1 tablet (50 mg) by mouth at onset of headache for migraine, Disp: 10 tablet, Rfl: 4    tiZANidine (ZANAFLEX) 2 MG tablet, TAKE 1/2 TO 2 TABLETS BY MOUTH THREE TIMES DAILY, Disp: 90 tablet, Rfl: 1      Objective:  Vitals:    10/09/24 0953   BP: 122/70   Pulse: 85   Resp: 16   Temp: 96.9  F (36.1  C)   TempSrc: Tympanic   SpO2: 100%   Weight: 84.5 kg (186 lb 4 oz)   Height: 1.772 m (5' 9.75\")       Body mass index is 26.92 kg/m .    Vital signs reviewed and stable  General: No acute distress  Psych: Appropriate affect  HEENT: moist mucous membranes, pupils equal, round, reactive to light and accomodation, tympanic membranes are pearly grey bilaterally  Lymph: no cervical or supraclavicular lymphadenopathy  Cardiovascular: regular rate and rhythm with no murmur  Pulmonary: clear to auscultation bilaterally with no wheeze  Abdomen: soft, non tender, non distended with normo-active bowel sounds  Extremities: warm and well perfused with no edema  Skin: warm and dry with no rash         This note has been dictated and transcribed using voice recognition " software.   Any errors in transcription are unintentional and inherent to the software.  Answers submitted by the patient for this visit:  Patient Health Questionnaire (Submitted on 10/9/2024)  If you checked off any problems, how difficult have these problems made it for you to do your work, take care of things at home, or get along with other people?: Somewhat difficult  PHQ9 TOTAL SCORE: 8  General Questionnaire (Submitted on 10/4/2024)  Chief Complaint: Chronic problems general questions HPI Form  What is the reason for your visit today? : Med management  How many servings of fruits and vegetables do you eat daily?: 4 or more  On average, how many sweetened beverages do you drink each day (Examples: soda, juice, sweet tea, etc.  Do NOT count diet or artificially sweetened beverages)?: 0  How many minutes a day do you exercise enough to make your heart beat faster?: 30 to 60  How many days a week do you exercise enough to make your heart beat faster?: 5  How many days per week do you miss taking your medication?: 0

## 2024-10-22 ENCOUNTER — PATIENT OUTREACH (OUTPATIENT)
Dept: CARE COORDINATION | Facility: CLINIC | Age: 29
End: 2024-10-22
Payer: COMMERCIAL

## 2024-11-05 ENCOUNTER — PATIENT OUTREACH (OUTPATIENT)
Dept: CARE COORDINATION | Facility: CLINIC | Age: 29
End: 2024-11-05
Payer: COMMERCIAL

## 2024-11-12 ENCOUNTER — MYC REFILL (OUTPATIENT)
Dept: FAMILY MEDICINE | Facility: CLINIC | Age: 29
End: 2024-11-12
Payer: COMMERCIAL

## 2024-11-12 DIAGNOSIS — G43.009 MIGRAINE WITHOUT AURA AND WITHOUT STATUS MIGRAINOSUS, NOT INTRACTABLE: ICD-10-CM

## 2024-11-12 RX ORDER — RIZATRIPTAN BENZOATE 5 MG/1
5 TABLET, ORALLY DISINTEGRATING ORAL
Qty: 10 TABLET | Refills: 2 | Status: SHIPPED | OUTPATIENT
Start: 2024-11-12

## 2024-12-28 ENCOUNTER — HEALTH MAINTENANCE LETTER (OUTPATIENT)
Age: 29
End: 2024-12-28

## 2025-01-18 ASSESSMENT — ASTHMA QUESTIONNAIRES
QUESTION_1 LAST FOUR WEEKS HOW MUCH OF THE TIME DID YOUR ASTHMA KEEP YOU FROM GETTING AS MUCH DONE AT WORK, SCHOOL OR AT HOME: SOME OF THE TIME
ACT_TOTALSCORE: 13
QUESTION_2 LAST FOUR WEEKS HOW OFTEN HAVE YOU HAD SHORTNESS OF BREATH: ONCE OR TWICE A WEEK
QUESTION_3 LAST FOUR WEEKS HOW OFTEN DID YOUR ASTHMA SYMPTOMS (WHEEZING, COUGHING, SHORTNESS OF BREATH, CHEST TIGHTNESS OR PAIN) WAKE YOU UP AT NIGHT OR EARLIER THAN USUAL IN THE MORNING: FOUR OR MORE NIGHTS A WEEK
ACT_TOTALSCORE: 13
QUESTION_4 LAST FOUR WEEKS HOW OFTEN HAVE YOU USED YOUR RESCUE INHALER OR NEBULIZER MEDICATION (SUCH AS ALBUTEROL): ONE OR TWO TIMES PER DAY
QUESTION_5 LAST FOUR WEEKS HOW WOULD YOU RATE YOUR ASTHMA CONTROL: SOMEWHAT CONTROLLED

## 2025-01-21 ENCOUNTER — VIRTUAL VISIT (OUTPATIENT)
Dept: FAMILY MEDICINE | Facility: CLINIC | Age: 30
End: 2025-01-21
Payer: COMMERCIAL

## 2025-01-21 DIAGNOSIS — N76.0 BV (BACTERIAL VAGINOSIS): ICD-10-CM

## 2025-01-21 DIAGNOSIS — J45.31 MILD PERSISTENT ASTHMA WITH ACUTE EXACERBATION: Primary | ICD-10-CM

## 2025-01-21 DIAGNOSIS — F33.1 MODERATE EPISODE OF RECURRENT MAJOR DEPRESSIVE DISORDER (H): ICD-10-CM

## 2025-01-21 DIAGNOSIS — U07.1 INFECTION DUE TO 2019 NOVEL CORONAVIRUS: ICD-10-CM

## 2025-01-21 DIAGNOSIS — B96.89 BV (BACTERIAL VAGINOSIS): ICD-10-CM

## 2025-01-21 DIAGNOSIS — G47.00 INSOMNIA, UNSPECIFIED TYPE: ICD-10-CM

## 2025-01-21 DIAGNOSIS — F98.8 ATTENTION DEFICIT DISORDER (ADD) WITHOUT HYPERACTIVITY: ICD-10-CM

## 2025-01-21 PROCEDURE — 98006 SYNCH AUDIO-VIDEO EST MOD 30: CPT | Performed by: FAMILY MEDICINE

## 2025-01-21 RX ORDER — FLUTICASONE PROPIONATE AND SALMETEROL 250; 50 UG/1; UG/1
1 POWDER RESPIRATORY (INHALATION) EVERY 12 HOURS
Qty: 60 EACH | Refills: 0 | Status: SHIPPED | OUTPATIENT
Start: 2025-01-21

## 2025-01-21 RX ORDER — DEXTROAMPHETAMINE SACCHARATE, AMPHETAMINE ASPARTATE MONOHYDRATE, DEXTROAMPHETAMINE SULFATE AND AMPHETAMINE SULFATE 3.75; 3.75; 3.75; 3.75 MG/1; MG/1; MG/1; MG/1
15 CAPSULE, EXTENDED RELEASE ORAL DAILY
Qty: 30 CAPSULE | Refills: 0 | Status: SHIPPED | OUTPATIENT
Start: 2025-01-21

## 2025-01-21 RX ORDER — METRONIDAZOLE 7.5 MG/G
1 GEL VAGINAL DAILY
Qty: 70 G | Refills: 0 | Status: SHIPPED | OUTPATIENT
Start: 2025-01-21

## 2025-01-21 RX ORDER — TRAZODONE HYDROCHLORIDE 50 MG/1
25-50 TABLET, FILM COATED ORAL
Qty: 90 TABLET | Refills: 3 | Status: SHIPPED | OUTPATIENT
Start: 2025-01-21

## 2025-01-21 RX ORDER — ALBUTEROL SULFATE 90 UG/1
2 INHALANT RESPIRATORY (INHALATION) EVERY 6 HOURS PRN
Qty: 18 G | Refills: 1 | Status: SHIPPED | OUTPATIENT
Start: 2025-01-21

## 2025-01-21 RX ORDER — DEXTROAMPHETAMINE SACCHARATE, AMPHETAMINE ASPARTATE, DEXTROAMPHETAMINE SULFATE AND AMPHETAMINE SULFATE 1.25; 1.25; 1.25; 1.25 MG/1; MG/1; MG/1; MG/1
5 TABLET ORAL DAILY
Qty: 30 TABLET | Refills: 0 | Status: SHIPPED | OUTPATIENT
Start: 2025-01-21

## 2025-01-21 ASSESSMENT — ANXIETY QUESTIONNAIRES: GAD7 TOTAL SCORE: INCOMPLETE

## 2025-01-21 NOTE — PROGRESS NOTES
Britney is a 29 year old who is being evaluated via a billable video visit.    How would you like to obtain your AVS? MyChart  If the video visit is dropped, the invitation should be resent by: Text to cell phone: [phone]  Will anyone else be joining your video visit? No      Assessment & Plan     Attention deficit disorder (ADD) without hyperactivity  - amphetamine-dextroamphetamine (ADDERALL XR) 15 MG 24 hr capsule; Take 1 capsule (15 mg) by mouth daily.  - amphetamine-dextroamphetamine (ADDERALL) 5 MG tablet; Take 1 tablet (5 mg) by mouth daily.    Infection due to 2019 novel coronavirus  Doing well - flare of asthma    Mild persistent asthma with acute exacerbation  Flare of asthma today.  Refill albuterol.  Advair sent to the pharmacy which she can use for the next month to see if this will help with her asthma symptoms.  Will send a message to myself to do a follow-up ACT in 30 days.  - albuterol (PROAIR HFA/PROVENTIL HFA/VENTOLIN HFA) 108 (90 Base) MCG/ACT inhaler; Inhale 2 puffs into the lungs every 6 hours as needed for shortness of breath.  - fluticasone-salmeterol (ADVAIR) 250-50 MCG/ACT inhaler; Inhale 1 puff into the lungs every 12 hours.    Insomnia, unspecified type  Trazodone sent to the pharmacy.  She has tolerated this well in the past.  If she can start with half a tablet and move up to a full tablet daily if she would like.  - traZODone (DESYREL) 50 MG tablet; Take 0.5-1 tablets (25-50 mg) by mouth nightly as needed for sleep.    BV (bacterial vaginosis)  Symptoms consistent with bacterial vaginosis which she has had in the past.  Metronidazole vaginal gel sent.  - metroNIDAZOLE (METROGEL) 0.75 % vaginal gel; Place 1 applicator (5 g) vaginally daily.    Moderate episode of recurrent major depressive disorder (H)  Stable at this time.      The longitudinal plan of care for the diagnosis(es)/condition(s) as documented were addressed during this visit. Due to the added complexity in care, I will  "continue to support Britney in the subsequent management and with ongoing continuity of care.    BMI  Estimated body mass index is 26.92 kg/m  as calculated from the following:    Height as of 10/9/24: 1.772 m (5' 9.75\").    Weight as of 10/9/24: 84.5 kg (186 lb 4 oz).   Weight management plan: Discussed healthy diet and exercise guidelines          Subjective   Britney is a 29 year old, presenting for the following health issues:  Recheck Medication, Asthma, and A.D.H.D        1/21/2025    11:33 AM   Additional Questions   Roomed by Rima SOTO LPN   Accompanied by self         1/21/2025    11:33 AM   Patient Reported Additional Medications   Patient reports taking the following new medications no new meds       Video Start Time: 1253The longitudinal plan of care for the diagnosis(es)/condition(s) as documented were addressed during this visit. Due to the added complexity in care, I will continue to support Britney in the subsequent management and with ongoing continuity of care.    History of Present Illness     Asthma:  She presents for follow up of asthma.  She has some cough, no wheezing, and no shortness of breath.  She is using a relief medication daily. She does not have a controller medication. Patient is aware of the following triggers: cold air, exercise or sports, humidity and upper respiratory infections. The patient has not had a visit to the Emergency Room, Urgent Care or Hospital due to asthma since the last clinic visit.     Reason for visit:  Adhd med followup & discussion of asthma post recent covid infection    She eats 4 or more servings of fruits and vegetables daily.She consumes 0 sweetened beverage(s) daily.She exercises with enough effort to increase her heart rate 20 to 29 minutes per day.  She exercises with enough effort to increase her heart rate 5 days per week.   She is taking medications regularly.   Asthma Follow-Up    Was ACT completed today?  Yes        1/18/2025     4:18 AM   ACT " Total Scores   ACT TOTAL SCORE (Goal Greater than or Equal to 20) 13    In the past 12 months, how many times did you visit the emergency room for your asthma without being admitted to the hospital? 0   In the past 12 months, how many times were you hospitalized overnight because of your asthma? 0       Patient-reported           Recheck ADHD/ADD. Adderall 15 MG    Updates since last visit: staying the same   Routine for taking medicine, including time: 9 am  Time medicine wears off: 3-4 PM  Issues at school/Work: None  Issues at home: Hard to things in the evening after it wears off.   Control of symptoms: Mostly controlled     Side effects:  Headaches: No  Stomach aches: No  Irritability/mood swings: No  Difficulties with sleep: Yes but not related to the medication she states, more trouble sleeping if she misses a dose of her adderall   Social withdrawal: No  Unusual movements/tics: No  Decreased appetite: Yes but works around it making sure she gets a good breakfast     Other concerns: would like to discuss gerneric medication instead     Medication Followup of Albuterol inhaler PRN  Taking Medication as prescribed: yes  Side Effects:  None  Medication Helping Symptoms:  yes      Since coming down with COVID a few weeks ago she has been feeling a bit short of breath.  Albuterol does help and she is using this a few times a day.  Prior to being sick she only used albuterol once or twice monthly.  She is wondering if there is something that she could get to feel a bit better more quickly.  She does feel as though she is improving.  She not having any fevers.  She does have a cough but is not productive.      Review of Systems  Constitutional, HEENT, cardiovascular, pulmonary, GI, , musculoskeletal, neuro, skin, endocrine and psych systems are negative, except as otherwise noted.      Objective    Vitals - Patient Reported  SpO2 (Patient Reported): 99  Pulse (Patient Reported): 84  Pain Score: No Pain  (0)      Vitals:  No vitals were obtained today due to virtual visit.    Physical Exam   GENERAL: alert and no distress  EYES: Eyes grossly normal to inspection.  No discharge or erythema, or obvious scleral/conjunctival abnormalities.  RESP: No audible wheeze, cough, or visible cyanosis.    SKIN: Visible skin clear. No significant rash, abnormal pigmentation or lesions.  NEURO: Cranial nerves grossly intact.  Mentation and speech appropriate for age.  PSYCH: Appropriate affect, tone, and pace of words          Video-Visit Details    Type of service:  Video Visit   Video End Time:120pm  Originating Location (pt. Location): Home    Distant Location (provider location):  On-site  Platform used for Video Visit: Lukas  Signed Electronically by: Anushka Arnold MD

## 2025-02-20 ENCOUNTER — MYC REFILL (OUTPATIENT)
Dept: FAMILY MEDICINE | Facility: CLINIC | Age: 30
End: 2025-02-20
Payer: COMMERCIAL

## 2025-02-20 ENCOUNTER — MYC MEDICAL ADVICE (OUTPATIENT)
Dept: FAMILY MEDICINE | Facility: CLINIC | Age: 30
End: 2025-02-20
Payer: COMMERCIAL

## 2025-02-20 DIAGNOSIS — F98.8 ATTENTION DEFICIT DISORDER (ADD) WITHOUT HYPERACTIVITY: ICD-10-CM

## 2025-02-20 DIAGNOSIS — J45.31 MILD PERSISTENT ASTHMA WITH ACUTE EXACERBATION: ICD-10-CM

## 2025-02-20 RX ORDER — DEXTROAMPHETAMINE SACCHARATE, AMPHETAMINE ASPARTATE MONOHYDRATE, DEXTROAMPHETAMINE SULFATE AND AMPHETAMINE SULFATE 3.75; 3.75; 3.75; 3.75 MG/1; MG/1; MG/1; MG/1
15 CAPSULE, EXTENDED RELEASE ORAL DAILY
Qty: 30 CAPSULE | Refills: 0 | Status: SHIPPED | OUTPATIENT
Start: 2025-02-20

## 2025-02-20 RX ORDER — FLUTICASONE PROPIONATE AND SALMETEROL 250; 50 UG/1; UG/1
1 POWDER RESPIRATORY (INHALATION) EVERY 12 HOURS
Qty: 60 EACH | Refills: 3 | Status: SHIPPED | OUTPATIENT
Start: 2025-02-20

## 2025-02-20 ASSESSMENT — ASTHMA QUESTIONNAIRES
ACT_TOTALSCORE: 19
QUESTION_1 LAST FOUR WEEKS HOW MUCH OF THE TIME DID YOUR ASTHMA KEEP YOU FROM GETTING AS MUCH DONE AT WORK, SCHOOL OR AT HOME: A LITTLE OF THE TIME
QUESTION_2 LAST FOUR WEEKS HOW OFTEN HAVE YOU HAD SHORTNESS OF BREATH: ONCE OR TWICE A WEEK
QUESTION_3 LAST FOUR WEEKS HOW OFTEN DID YOUR ASTHMA SYMPTOMS (WHEEZING, COUGHING, SHORTNESS OF BREATH, CHEST TIGHTNESS OR PAIN) WAKE YOU UP AT NIGHT OR EARLIER THAN USUAL IN THE MORNING: ONCE A WEEK
QUESTION_4 LAST FOUR WEEKS HOW OFTEN HAVE YOU USED YOUR RESCUE INHALER OR NEBULIZER MEDICATION (SUCH AS ALBUTEROL): ONCE A WEEK OR LESS
QUESTION_5 LAST FOUR WEEKS HOW WOULD YOU RATE YOUR ASTHMA CONTROL: WELL CONTROLLED
ACT_TOTALSCORE: 21

## 2025-02-21 ENCOUNTER — TELEPHONE (OUTPATIENT)
Dept: FAMILY MEDICINE | Facility: CLINIC | Age: 30
End: 2025-02-21
Payer: COMMERCIAL

## 2025-02-21 NOTE — TELEPHONE ENCOUNTER
Prior Authorization Retail Medication Request    Medication/Dose: fluticasone-salmeterol (ADVAIR) 250-50 MCG/ACT inhaler  Diagnosis and ICD code (if different than what is on RX):  Mild persistent asthma with acute exacerbation [J45.31]   New/renewal/insurance change PA/secondary ins. PA:  Previously Tried and Failed:    Rationale:      Insurance   Primary:   Insurance ID:    Key: [id number]  Francisco  1995      Secondary (if applicable):  Insurance ID:      Pharmacy Information (if different than what is on RX)  Name:  [facility]  Phone:  [phone]  Fax:[phone]    Clinic Information  Preferred routing pool for dept communication:

## 2025-02-25 NOTE — TELEPHONE ENCOUNTER
Prior Authorization Not Needed per Insurance    Medication: fluticasone-salmeterol (ADVAIR) 250-50 MCG/ACT inhaler  Insurance Company: [facility] - Phone [phone] Fax [phone]  Expected CoPay:      Pharmacy Filling the Rx: [id number] - [place], [place] - [facility] [address]   Pharmacy Notified:  Yes  Patient Notified:  **Instructed pharmacy to notify patient when script is ready to /ship.**

## 2025-02-25 NOTE — TELEPHONE ENCOUNTER
Central Prior Authorization Team   Phone: [phone]    PA Initiation    Medication: fluticasone-salmeterol (ADVAIR) 250-50 MCG/ACT inhaler  Insurance Company: [employer] [place] - Phone [phone] Fax [phone]  Pharmacy Filling the Rx: [facility] [address] [place] [address]  Filling Pharmacy Phone: [phone]  Filling Pharmacy Fax:    Start Date: 2/25/2025    [facility] KEY: [id number]

## 2025-03-24 ENCOUNTER — MYC REFILL (OUTPATIENT)
Dept: FAMILY MEDICINE | Facility: CLINIC | Age: 30
End: 2025-03-24
Payer: COMMERCIAL

## 2025-03-24 DIAGNOSIS — F98.8 ATTENTION DEFICIT DISORDER (ADD) WITHOUT HYPERACTIVITY: ICD-10-CM

## 2025-03-24 RX ORDER — DEXTROAMPHETAMINE SACCHARATE, AMPHETAMINE ASPARTATE MONOHYDRATE, DEXTROAMPHETAMINE SULFATE AND AMPHETAMINE SULFATE 3.75; 3.75; 3.75; 3.75 MG/1; MG/1; MG/1; MG/1
15 CAPSULE, EXTENDED RELEASE ORAL DAILY
Qty: 30 CAPSULE | Refills: 0 | Status: SHIPPED | OUTPATIENT
Start: 2025-03-24

## 2025-04-28 ENCOUNTER — MYC REFILL (OUTPATIENT)
Dept: FAMILY MEDICINE | Facility: CLINIC | Age: 30
End: 2025-04-28
Payer: COMMERCIAL

## 2025-04-28 DIAGNOSIS — T75.3XXA MOTION SICKNESS, INITIAL ENCOUNTER: ICD-10-CM

## 2025-04-28 DIAGNOSIS — F98.8 ATTENTION DEFICIT DISORDER (ADD) WITHOUT HYPERACTIVITY: ICD-10-CM

## 2025-04-28 RX ORDER — DEXTROAMPHETAMINE SACCHARATE, AMPHETAMINE ASPARTATE MONOHYDRATE, DEXTROAMPHETAMINE SULFATE AND AMPHETAMINE SULFATE 3.75; 3.75; 3.75; 3.75 MG/1; MG/1; MG/1; MG/1
15 CAPSULE, EXTENDED RELEASE ORAL DAILY
Qty: 30 CAPSULE | Refills: 0 | Status: SHIPPED | OUTPATIENT
Start: 2025-04-28

## 2025-04-28 RX ORDER — SCOPOLAMINE 1 MG/3D
1 PATCH, EXTENDED RELEASE TRANSDERMAL
Qty: 4 PATCH | Refills: 0 | Status: SHIPPED | OUTPATIENT
Start: 2025-04-28

## 2025-05-20 ENCOUNTER — PATIENT OUTREACH (OUTPATIENT)
Dept: CARE COORDINATION | Facility: CLINIC | Age: 30
End: 2025-05-20
Payer: COMMERCIAL

## 2025-06-03 ENCOUNTER — VIRTUAL VISIT (OUTPATIENT)
Dept: FAMILY MEDICINE | Facility: CLINIC | Age: 30
End: 2025-06-03
Payer: COMMERCIAL

## 2025-06-03 ENCOUNTER — ALLIED HEALTH/NURSE VISIT (OUTPATIENT)
Dept: FAMILY MEDICINE | Facility: CLINIC | Age: 30
End: 2025-06-03
Payer: COMMERCIAL

## 2025-06-03 DIAGNOSIS — J45.40 MODERATE PERSISTENT ASTHMA, UNSPECIFIED WHETHER COMPLICATED: Primary | ICD-10-CM

## 2025-06-03 DIAGNOSIS — Z23 NEED FOR VACCINATION: Primary | ICD-10-CM

## 2025-06-03 DIAGNOSIS — F98.8 ATTENTION DEFICIT DISORDER (ADD) WITHOUT HYPERACTIVITY: ICD-10-CM

## 2025-06-03 PROCEDURE — 90471 IMMUNIZATION ADMIN: CPT

## 2025-06-03 PROCEDURE — 99207 PR NO CHARGE NURSE ONLY: CPT

## 2025-06-03 PROCEDURE — 90715 TDAP VACCINE 7 YRS/> IM: CPT

## 2025-06-03 PROCEDURE — 98006 SYNCH AUDIO-VIDEO EST MOD 30: CPT | Performed by: FAMILY MEDICINE

## 2025-06-03 RX ORDER — DEXTROAMPHETAMINE SACCHARATE, AMPHETAMINE ASPARTATE MONOHYDRATE, DEXTROAMPHETAMINE SULFATE AND AMPHETAMINE SULFATE 5; 5; 5; 5 MG/1; MG/1; MG/1; MG/1
20 CAPSULE, EXTENDED RELEASE ORAL DAILY
Qty: 30 CAPSULE | Refills: 0 | Status: SHIPPED | OUTPATIENT
Start: 2025-08-02 | End: 2025-09-01

## 2025-06-03 RX ORDER — OLANZAPINE 5 MG/1
5 TABLET, FILM COATED ORAL AT BEDTIME
COMMUNITY
Start: 2025-02-10

## 2025-06-03 RX ORDER — DEXTROAMPHETAMINE SACCHARATE, AMPHETAMINE ASPARTATE MONOHYDRATE, DEXTROAMPHETAMINE SULFATE AND AMPHETAMINE SULFATE 5; 5; 5; 5 MG/1; MG/1; MG/1; MG/1
20 CAPSULE, EXTENDED RELEASE ORAL DAILY
Qty: 30 CAPSULE | Refills: 0 | Status: SHIPPED | OUTPATIENT
Start: 2025-07-03 | End: 2025-08-02

## 2025-06-03 RX ORDER — FLUTICASONE PROPIONATE 220 UG/1
1 AEROSOL, METERED RESPIRATORY (INHALATION) 2 TIMES DAILY
Qty: 12 G | Refills: 5 | Status: SHIPPED | OUTPATIENT
Start: 2025-06-03

## 2025-06-03 RX ORDER — OXCARBAZEPINE 600 MG/1
300 TABLET, FILM COATED ORAL
COMMUNITY
Start: 2025-02-17

## 2025-06-03 RX ORDER — NALTREXONE HYDROCHLORIDE 50 MG/1
TABLET, FILM COATED ORAL
COMMUNITY
Start: 2025-05-29

## 2025-06-03 RX ORDER — DEXTROAMPHETAMINE SACCHARATE, AMPHETAMINE ASPARTATE, DEXTROAMPHETAMINE SULFATE AND AMPHETAMINE SULFATE 1.25; 1.25; 1.25; 1.25 MG/1; MG/1; MG/1; MG/1
5 TABLET ORAL DAILY
Qty: 30 TABLET | Refills: 0 | Status: SHIPPED | OUTPATIENT
Start: 2025-06-03

## 2025-06-03 RX ORDER — VILAZODONE HYDROCHLORIDE 40 MG/1
40 TABLET ORAL DAILY
COMMUNITY
Start: 2025-05-29

## 2025-06-03 RX ORDER — DEXTROAMPHETAMINE SACCHARATE, AMPHETAMINE ASPARTATE MONOHYDRATE, DEXTROAMPHETAMINE SULFATE AND AMPHETAMINE SULFATE 5; 5; 5; 5 MG/1; MG/1; MG/1; MG/1
20 CAPSULE, EXTENDED RELEASE ORAL DAILY
Qty: 30 CAPSULE | Refills: 0 | Status: SHIPPED | OUTPATIENT
Start: 2025-06-03 | End: 2025-07-03

## 2025-06-03 ASSESSMENT — ASTHMA QUESTIONNAIRES: ACT_TOTALSCORE: 22

## 2025-06-03 NOTE — PROGRESS NOTES
Britney is a 29 year old who is being evaluated via a billable video visit.    How would you like to obtain your AVS? MyChart  If the video visit is dropped, the invitation should be resent by: Text to cell phone: [phone]  Will anyone else be joining your video visit? No      Assessment & Plan     Attention deficit disorder (ADD) without hyperactivity  Increase Adderall to 20 mg daily.  She uses the 5 mg immediate release occasionally I will send that up well.  She will let me know if the 20 mg seems like too much and we can decrease back to the 15.  - amphetamine-dextroamphetamine (ADDERALL XR) 20 MG 24 hr capsule; Take 1 capsule (20 mg) by mouth daily.  - amphetamine-dextroamphetamine (ADDERALL XR) 20 MG 24 hr capsule; Take 1 capsule (20 mg) by mouth daily.  - amphetamine-dextroamphetamine (ADDERALL XR) 20 MG 24 hr capsule; Take 1 capsule (20 mg) by mouth daily.  - amphetamine-dextroamphetamine (ADDERALL) 5 MG tablet; Take 1 tablet (5 mg) by mouth daily.    Moderate persistent asthma, unspecified whether complicated  Patient prefers a HFA.  She was on Flovent previously.  We did discuss that Flovent only fluticasone whereas her current Advair is fluticasone plus salmeterol.  She is okay with trying this and will let me know if she has other concerns.    - fluticasone (FLOVENT HFA) 220 MCG/ACT inhaler; Inhale 1 puff into the lungs 2 times daily.                Subjective   Britney is a 29 year old, presenting for the following health issues:  Recheck Medication (Med check- ADHD)      6/3/2025    11:42 AM   Additional Questions   Roomed by Carmen Goode CMA   Accompanied by Self     Video Start Time: 115    History of Present Illness       Reason for visit:  ADHD medication management and inquiring about scheduling nurse appointment for Tdap vaccine    She eats 4 or more servings of fruits and vegetables daily.She consumes 0 sweetened beverage(s) daily.She exercises with enough effort to increase her heart  rate 20 to 29 minutes per day.  She exercises with enough effort to increase her heart rate 6 days per week.   She is taking medications regularly.      Britney is overall doing well.  She states that she had a bout of depression back in February and saw a psychiatrist close to her area.  They initially started her on Trileptal.  This was not overly effective so they added Viibryd and Zyprexa as well.  She will be weaning off the Trileptal soon.  She feels as though her mood has been improved.  Recently increased dose of Viibryd has been helpful.    She is currently on Adderall 15 mg extended release daily.  She wonders about increasing slightly to increase efficacy.  She also has 5 mg immediate release tablets which she takes very occasionally in the afternoon.  Last refill of those was about 6 months ago when she states she only is about 5 left.    She has a history of asthma.  Currently on Advair once or twice daily.  Wondering about switching back to Flovent which she was on previously and found easier to take.  Asthma has been well-controlled.  She has not really needed her rescue inhaler.          Review of Systems  Constitutional, HEENT, cardiovascular, pulmonary, GI, , musculoskeletal, neuro, skin, endocrine and psych systems are negative, except as otherwise noted.      Objective           Vitals:  No vitals were obtained today due to virtual visit.    Physical Exam   GENERAL: alert and no distress  EYES: Eyes grossly normal to inspection.  No discharge or erythema, or obvious scleral/conjunctival abnormalities.  RESP: No audible wheeze, cough, or visible cyanosis.    SKIN: Visible skin clear. No significant rash, abnormal pigmentation or lesions.  NEURO: Cranial nerves grossly intact.  Mentation and speech appropriate for age.  PSYCH: Appropriate affect, tone, and pace of words          Video-Visit Details    Type of service:  Video Visit   Video End Time:135pm  Originating Location (pt. Location):  Home    Distant Location (provider location):  On-site  Platform used for Video Visit: Lukas  Signed Electronically by: Anushka Arnold MD

## 2025-07-11 ENCOUNTER — MYC REFILL (OUTPATIENT)
Dept: FAMILY MEDICINE | Facility: CLINIC | Age: 30
End: 2025-07-11
Payer: COMMERCIAL

## 2025-07-11 DIAGNOSIS — F98.8 ATTENTION DEFICIT DISORDER (ADD) WITHOUT HYPERACTIVITY: ICD-10-CM

## 2025-07-11 DIAGNOSIS — J45.40 MODERATE PERSISTENT ASTHMA, UNSPECIFIED WHETHER COMPLICATED: ICD-10-CM

## 2025-07-14 RX ORDER — FLUTICASONE PROPIONATE 220 UG/1
1 AEROSOL, METERED RESPIRATORY (INHALATION) 2 TIMES DAILY
Qty: 12 G | Refills: 5 | OUTPATIENT
Start: 2025-07-14

## 2025-07-14 RX ORDER — DEXTROAMPHETAMINE SACCHARATE, AMPHETAMINE ASPARTATE MONOHYDRATE, DEXTROAMPHETAMINE SULFATE AND AMPHETAMINE SULFATE 5; 5; 5; 5 MG/1; MG/1; MG/1; MG/1
20 CAPSULE, EXTENDED RELEASE ORAL DAILY
Qty: 30 CAPSULE | Refills: 0 | OUTPATIENT
Start: 2025-07-14

## 2025-07-14 RX ORDER — DEXTROAMPHETAMINE SACCHARATE, AMPHETAMINE ASPARTATE, DEXTROAMPHETAMINE SULFATE AND AMPHETAMINE SULFATE 1.25; 1.25; 1.25; 1.25 MG/1; MG/1; MG/1; MG/1
5 TABLET ORAL DAILY
Qty: 30 TABLET | Refills: 0 | Status: SHIPPED | OUTPATIENT
Start: 2025-07-14

## 2025-08-06 ENCOUNTER — VIRTUAL VISIT (OUTPATIENT)
Dept: FAMILY MEDICINE | Facility: CLINIC | Age: 30
End: 2025-08-06
Payer: COMMERCIAL

## 2025-08-06 DIAGNOSIS — F33.1 MODERATE EPISODE OF RECURRENT MAJOR DEPRESSIVE DISORDER (H): ICD-10-CM

## 2025-08-06 DIAGNOSIS — F41.9 ANXIETY: ICD-10-CM

## 2025-08-06 DIAGNOSIS — G47.00 INSOMNIA, UNSPECIFIED TYPE: ICD-10-CM

## 2025-08-06 DIAGNOSIS — F98.8 ATTENTION DEFICIT DISORDER (ADD) WITHOUT HYPERACTIVITY: Primary | ICD-10-CM

## 2025-08-06 PROCEDURE — 98006 SYNCH AUDIO-VIDEO EST MOD 30: CPT | Performed by: FAMILY MEDICINE

## 2025-08-06 RX ORDER — CLONIDINE HYDROCHLORIDE 0.1 MG/1
0.1 TABLET, EXTENDED RELEASE ORAL 2 TIMES DAILY
Qty: 180 TABLET | Refills: 1 | Status: SHIPPED | OUTPATIENT
Start: 2025-08-06

## 2025-08-06 RX ORDER — LAMOTRIGINE 25 MG/1
50 TABLET ORAL DAILY
COMMUNITY
Start: 2025-07-24

## 2025-08-06 RX ORDER — CLONAZEPAM 0.5 MG/1
0.5 TABLET ORAL 2 TIMES DAILY PRN
Qty: 15 TABLET | Refills: 0 | Status: SHIPPED | OUTPATIENT
Start: 2025-08-06

## 2025-08-06 RX ORDER — DEXTROAMPHETAMINE SACCHARATE, AMPHETAMINE ASPARTATE, DEXTROAMPHETAMINE SULFATE AND AMPHETAMINE SULFATE 1.25; 1.25; 1.25; 1.25 MG/1; MG/1; MG/1; MG/1
5 TABLET ORAL DAILY
Qty: 30 TABLET | Refills: 0 | Status: SHIPPED | OUTPATIENT
Start: 2025-08-06

## 2025-08-06 RX ORDER — DEXTROAMPHETAMINE SACCHARATE, AMPHETAMINE ASPARTATE MONOHYDRATE, DEXTROAMPHETAMINE SULFATE AND AMPHETAMINE SULFATE 3.75; 3.75; 3.75; 3.75 MG/1; MG/1; MG/1; MG/1
15 CAPSULE, EXTENDED RELEASE ORAL DAILY
Qty: 30 CAPSULE | Refills: 0 | Status: SHIPPED | OUTPATIENT
Start: 2025-08-06